# Patient Record
Sex: FEMALE | Race: WHITE | NOT HISPANIC OR LATINO | Employment: OTHER | URBAN - METROPOLITAN AREA
[De-identification: names, ages, dates, MRNs, and addresses within clinical notes are randomized per-mention and may not be internally consistent; named-entity substitution may affect disease eponyms.]

---

## 2022-12-15 ENCOUNTER — HOSPITAL ENCOUNTER (INPATIENT)
Facility: HOSPITAL | Age: 87
LOS: 5 days | Discharge: NURSING, REHAB OR LONG-TERM CARE | End: 2022-12-20
Attending: EMERGENCY MEDICINE | Admitting: INTERNAL MEDICINE

## 2022-12-15 ENCOUNTER — APPOINTMENT (OUTPATIENT)
Dept: GENERAL RADIOLOGY | Facility: HOSPITAL | Age: 87
End: 2022-12-15

## 2022-12-15 ENCOUNTER — APPOINTMENT (OUTPATIENT)
Dept: CT IMAGING | Facility: HOSPITAL | Age: 87
End: 2022-12-15

## 2022-12-15 DIAGNOSIS — R13.10 DYSPHAGIA, UNSPECIFIED TYPE: ICD-10-CM

## 2022-12-15 DIAGNOSIS — R47.1 DYSARTHRIA: ICD-10-CM

## 2022-12-15 DIAGNOSIS — I16.0 HYPERTENSIVE URGENCY: ICD-10-CM

## 2022-12-15 DIAGNOSIS — R53.1 ACUTE RIGHT-SIDED WEAKNESS: Primary | ICD-10-CM

## 2022-12-15 PROBLEM — I10 ESSENTIAL HYPERTENSION: Status: ACTIVE | Noted: 2022-12-15

## 2022-12-15 LAB
ALBUMIN SERPL-MCNC: 4.3 G/DL (ref 3.5–5.2)
ALBUMIN/GLOB SERPL: 1.7 G/DL
ALP SERPL-CCNC: 65 U/L (ref 39–117)
ALT SERPL W P-5'-P-CCNC: 17 U/L (ref 1–33)
ANION GAP SERPL CALCULATED.3IONS-SCNC: 13 MMOL/L (ref 5–15)
APTT PPP: 28.3 SECONDS (ref 22–39)
AST SERPL-CCNC: 28 U/L (ref 1–32)
BACTERIA UR QL AUTO: ABNORMAL /HPF
BASOPHILS # BLD AUTO: 0.05 10*3/MM3 (ref 0–0.2)
BASOPHILS NFR BLD AUTO: 0.9 % (ref 0–1.5)
BILIRUB SERPL-MCNC: 0.7 MG/DL (ref 0–1.2)
BILIRUB UR QL STRIP: NEGATIVE
BUN BLDA-MCNC: 8 MG/DL (ref 8–26)
BUN SERPL-MCNC: 8 MG/DL (ref 8–23)
BUN/CREAT SERPL: 14.5 (ref 7–25)
CA-I BLDA-SCNC: 1.15 MMOL/L (ref 1.2–1.32)
CALCIUM SPEC-SCNC: 9.2 MG/DL (ref 8.2–9.6)
CHLORIDE BLDA-SCNC: 104 MMOL/L (ref 98–109)
CHLORIDE SERPL-SCNC: 105 MMOL/L (ref 98–107)
CLARITY UR: CLEAR
CO2 BLDA-SCNC: 25 MMOL/L (ref 24–29)
CO2 SERPL-SCNC: 24 MMOL/L (ref 22–29)
COLOR UR: YELLOW
CREAT BLDA-MCNC: 0.5 MG/DL (ref 0.6–1.3)
CREAT SERPL-MCNC: 0.55 MG/DL (ref 0.57–1)
DEPRECATED RDW RBC AUTO: 46.3 FL (ref 37–54)
EGFRCR SERPLBLD CKD-EPI 2021: 83 ML/MIN/1.73
EGFRCR SERPLBLD CKD-EPI 2021: 84.9 ML/MIN/1.73
EOSINOPHIL # BLD AUTO: 0.2 10*3/MM3 (ref 0–0.4)
EOSINOPHIL NFR BLD AUTO: 3.7 % (ref 0.3–6.2)
ERYTHROCYTE [DISTWIDTH] IN BLOOD BY AUTOMATED COUNT: 13.6 % (ref 12.3–15.4)
FLUAV RNA RESP QL NAA+PROBE: NOT DETECTED
FLUBV RNA RESP QL NAA+PROBE: NOT DETECTED
GLOBULIN UR ELPH-MCNC: 2.5 GM/DL
GLUCOSE BLDC GLUCOMTR-MCNC: 108 MG/DL (ref 70–130)
GLUCOSE SERPL-MCNC: 117 MG/DL (ref 65–99)
GLUCOSE UR STRIP-MCNC: NEGATIVE MG/DL
HCT VFR BLD AUTO: 45.4 % (ref 34–46.6)
HCT VFR BLDA CALC: 44 % (ref 38–51)
HGB BLD-MCNC: 15.4 G/DL (ref 12–15.9)
HGB BLDA-MCNC: 15 G/DL (ref 12–17)
HGB UR QL STRIP.AUTO: ABNORMAL
HOLD SPECIMEN: NORMAL
HYALINE CASTS UR QL AUTO: ABNORMAL /LPF
IMM GRANULOCYTES # BLD AUTO: 0.02 10*3/MM3 (ref 0–0.05)
IMM GRANULOCYTES NFR BLD AUTO: 0.4 % (ref 0–0.5)
INR PPP: 1.1 (ref 0.8–1.2)
KETONES UR QL STRIP: ABNORMAL
LEUKOCYTE ESTERASE UR QL STRIP.AUTO: NEGATIVE
LYMPHOCYTES # BLD AUTO: 1.45 10*3/MM3 (ref 0.7–3.1)
LYMPHOCYTES NFR BLD AUTO: 26.6 % (ref 19.6–45.3)
MCH RBC QN AUTO: 31.6 PG (ref 26.6–33)
MCHC RBC AUTO-ENTMCNC: 33.9 G/DL (ref 31.5–35.7)
MCV RBC AUTO: 93.2 FL (ref 79–97)
MONOCYTES # BLD AUTO: 0.55 10*3/MM3 (ref 0.1–0.9)
MONOCYTES NFR BLD AUTO: 10.1 % (ref 5–12)
NEUTROPHILS NFR BLD AUTO: 3.18 10*3/MM3 (ref 1.7–7)
NEUTROPHILS NFR BLD AUTO: 58.3 % (ref 42.7–76)
NITRITE UR QL STRIP: NEGATIVE
NRBC BLD AUTO-RTO: 0 /100 WBC (ref 0–0.2)
PH UR STRIP.AUTO: 8 [PH] (ref 5–8)
PLATELET # BLD AUTO: 230 10*3/MM3 (ref 140–450)
PMV BLD AUTO: 9.9 FL (ref 6–12)
POTASSIUM BLDA-SCNC: 3.9 MMOL/L (ref 3.5–4.9)
POTASSIUM SERPL-SCNC: 4.3 MMOL/L (ref 3.5–5.2)
PROT SERPL-MCNC: 6.8 G/DL (ref 6–8.5)
PROT UR QL STRIP: NEGATIVE
PROTHROMBIN TIME: 13.2 SECONDS (ref 12.8–15.2)
QT INTERVAL: 394 MS
QTC INTERVAL: 476 MS
RBC # BLD AUTO: 4.87 10*6/MM3 (ref 3.77–5.28)
RBC # UR STRIP: ABNORMAL /HPF
REF LAB TEST METHOD: ABNORMAL
SARS-COV-2 RNA RESP QL NAA+PROBE: NOT DETECTED
SODIUM BLD-SCNC: 141 MMOL/L (ref 138–146)
SODIUM SERPL-SCNC: 142 MMOL/L (ref 136–145)
SP GR UR STRIP: 1.02 (ref 1–1.03)
SQUAMOUS #/AREA URNS HPF: ABNORMAL /HPF
TROPONIN T SERPL-MCNC: 0.01 NG/ML (ref 0–0.03)
UROBILINOGEN UR QL STRIP: ABNORMAL
WBC # UR STRIP: ABNORMAL /HPF
WBC NRBC COR # BLD: 5.45 10*3/MM3 (ref 3.4–10.8)
WHOLE BLOOD HOLD COAG: NORMAL
WHOLE BLOOD HOLD SPECIMEN: NORMAL

## 2022-12-15 PROCEDURE — 80047 BASIC METABLC PNL IONIZED CA: CPT

## 2022-12-15 PROCEDURE — 85730 THROMBOPLASTIN TIME PARTIAL: CPT

## 2022-12-15 PROCEDURE — 36415 COLL VENOUS BLD VENIPUNCTURE: CPT

## 2022-12-15 PROCEDURE — 87636 SARSCOV2 & INF A&B AMP PRB: CPT | Performed by: EMERGENCY MEDICINE

## 2022-12-15 PROCEDURE — 99221 1ST HOSP IP/OBS SF/LOW 40: CPT | Performed by: INTERNAL MEDICINE

## 2022-12-15 PROCEDURE — 70450 CT HEAD/BRAIN W/O DYE: CPT

## 2022-12-15 PROCEDURE — 85610 PROTHROMBIN TIME: CPT

## 2022-12-15 PROCEDURE — 85014 HEMATOCRIT: CPT

## 2022-12-15 PROCEDURE — 70498 CT ANGIOGRAPHY NECK: CPT

## 2022-12-15 PROCEDURE — 92523 SPEECH SOUND LANG COMPREHEN: CPT

## 2022-12-15 PROCEDURE — 99285 EMERGENCY DEPT VISIT HI MDM: CPT

## 2022-12-15 PROCEDURE — 0 IOPAMIDOL PER 1 ML: Performed by: EMERGENCY MEDICINE

## 2022-12-15 PROCEDURE — 71045 X-RAY EXAM CHEST 1 VIEW: CPT

## 2022-12-15 PROCEDURE — 84484 ASSAY OF TROPONIN QUANT: CPT

## 2022-12-15 PROCEDURE — 81001 URINALYSIS AUTO W/SCOPE: CPT | Performed by: EMERGENCY MEDICINE

## 2022-12-15 PROCEDURE — 80053 COMPREHEN METABOLIC PANEL: CPT

## 2022-12-15 PROCEDURE — 0042T HC CT CEREBRAL PERFUSION W/WO CONTRAST: CPT

## 2022-12-15 PROCEDURE — 92610 EVALUATE SWALLOWING FUNCTION: CPT

## 2022-12-15 PROCEDURE — 85025 COMPLETE CBC W/AUTO DIFF WBC: CPT

## 2022-12-15 PROCEDURE — 4A03X5D MEASUREMENT OF ARTERIAL FLOW, INTRACRANIAL, EXTERNAL APPROACH: ICD-10-PCS | Performed by: INTERNAL MEDICINE

## 2022-12-15 PROCEDURE — 99223 1ST HOSP IP/OBS HIGH 75: CPT

## 2022-12-15 PROCEDURE — 93005 ELECTROCARDIOGRAM TRACING: CPT

## 2022-12-15 PROCEDURE — 70496 CT ANGIOGRAPHY HEAD: CPT

## 2022-12-15 RX ORDER — SODIUM CHLORIDE 0.9 % (FLUSH) 0.9 %
10 SYRINGE (ML) INJECTION AS NEEDED
Status: DISCONTINUED | OUTPATIENT
Start: 2022-12-15 | End: 2022-12-20 | Stop reason: HOSPADM

## 2022-12-15 RX ORDER — ASPIRIN 81 MG/1
81 TABLET, CHEWABLE ORAL DAILY
Status: DISCONTINUED | OUTPATIENT
Start: 2022-12-15 | End: 2022-12-16

## 2022-12-15 RX ORDER — SODIUM CHLORIDE 0.9 % (FLUSH) 0.9 %
10 SYRINGE (ML) INJECTION EVERY 12 HOURS SCHEDULED
Status: DISCONTINUED | OUTPATIENT
Start: 2022-12-15 | End: 2022-12-20 | Stop reason: HOSPADM

## 2022-12-15 RX ORDER — SODIUM CHLORIDE 9 MG/ML
40 INJECTION, SOLUTION INTRAVENOUS AS NEEDED
Status: DISCONTINUED | OUTPATIENT
Start: 2022-12-15 | End: 2022-12-20 | Stop reason: HOSPADM

## 2022-12-15 RX ORDER — ASPIRIN 300 MG/1
300 SUPPOSITORY RECTAL DAILY
Status: DISCONTINUED | OUTPATIENT
Start: 2022-12-15 | End: 2022-12-16

## 2022-12-15 RX ORDER — SODIUM CHLORIDE 9 MG/ML
100 INJECTION, SOLUTION INTRAVENOUS CONTINUOUS
Status: ACTIVE | OUTPATIENT
Start: 2022-12-15 | End: 2022-12-16

## 2022-12-15 RX ORDER — ATORVASTATIN CALCIUM 40 MG/1
40 TABLET, FILM COATED ORAL NIGHTLY
Status: DISCONTINUED | OUTPATIENT
Start: 2022-12-15 | End: 2022-12-20 | Stop reason: HOSPADM

## 2022-12-15 RX ADMIN — Medication 10 ML: at 21:26

## 2022-12-15 RX ADMIN — IOPAMIDOL 115 ML: 755 INJECTION, SOLUTION INTRAVENOUS at 10:10

## 2022-12-15 RX ADMIN — SODIUM CHLORIDE 100 ML/HR: 9 INJECTION, SOLUTION INTRAVENOUS at 16:13

## 2022-12-15 RX ADMIN — ASPIRIN 300 MG: 300 SUPPOSITORY RECTAL at 12:17

## 2022-12-15 NOTE — THERAPY EVALUATION
Acute Care - Speech Language Pathology Initial Evaluation  [facility]   Cognitive-Communication Evaluation  Clinical Swallow Evaluation       Patient Name: Lu Hou  : 1924  MRN: [id number]  Today's Date: 12/15/2022               Admit Date: 12/15/2022     Visit Dx:    ICD-10-CM ICD-9-CM   1. Acute right-sided weakness  R53.1 728.87   2. Hypertensive urgency  I16.0 401.9   3. Dysarthria  R47.1 784.51   4. Dysphagia, unspecified type  R13.10 787.20     Patient Active Problem List   Diagnosis   • Right sided weakness   • Essential hypertension     Past Medical History:   Diagnosis Date   • Hypertension      History reviewed. No pertinent surgical history.    SLP Recommendation and Plan  SLP Diagnosis: mild, dysarthria, cognitive-linguistic disorder (12/15/22 1540)  SLP Diagnosis Comments: Pt w/ mild dysarthria and at least mild cognitive communication deficits. Receptive/expressive language is grossly functional. No family present to assist in determining baseline status. SLP will f/u for tx as appropriate (12/15/22 1540)        Swallow Criteria for Skilled Therapeutic Interventions Met: demonstrates skilled criteria (12/15/22 1540)  SLC Criteria for Skilled Therapy Interventions Met: yes (12/15/22 1540)  Anticipated Discharge Disposition (SLP): unknown, anticipate therapy at next level of care (12/15/22 1540)        Predicted Duration Therapy Intervention (Days): until discharge (12/15/22 1540)  SLC Diagnostic Follow-Up Needed: reading comprehension, graphic expression, higher-level cognitive-linguistic, other (see comments) (Clarification of cog comm baseline status) (12/15/22 1540)                              SLP EVALUATION (last 72 hours)     SLP SLC Evaluation     Row Name 12/15/22 1540                   General Information    Prior Level of Function-Communication unknown  -MH        Patient's Goals for Discharge patient did not state  -           Comprehension Assessment/Intervention     Comprehension Assessment/Intervention Auditory Comprehension  -           Auditory Comprehension Assessment/Intervention    Auditory Comprehension (Communication) WFL  -           Expression Assessment/Intervention    Expression Assessment/Intervention verbal expression  -           Verbal Expression Assessment/Intervention    Verbal Expression WFL  -           Oral Motor Structure and Function    Oral Motor Structure and Function WFL  -           Motor Speech Assessment/Intervention    Motor Speech Function mild impairment  -        Characteristics Consistent with Dysarthria decreased intensity;slurred speech  -           Cognitive Assessment Intervention- SLP    Cognitive Function (Cognition) mild impairment;other (see comments)  Difficult to fully assess  -        Orientation Status (Cognition) awareness of basic personal information;person;WFL;place;time;situation;mild impairment  -        Memory (Cognitive) simple;immediate;WFL;delayed;mild impairment  -        Attention (Cognitive) selective;sustained;WFL  -        Thought Organization (Cognitive) concrete divergent;concrete convergent;mild impairment  -        Reasoning (Cognitive) --  -           SLP Evaluation Clinical Impressions    SLP Diagnosis mild;dysarthria;cognitive-linguistic disorder  -        SLP Diagnosis Comments Pt w/ mild dysarthria and at least mild cognitive communication deficits. Receptive/expressive language is grossly functional. No family present to assist in determining baseline status. SLP will f/u for tx as appropriate  -        Rehab Potential/Prognosis good  -        SLC Criteria for Skilled Therapy Interventions Met yes  -        Functional Impact difficulty in expressing complex messages;difficulty communicating in an emergency  -           Recommendations    Therapy Frequency (SLP SLC) 3 days per week  -        Further Assessment Needed in the Following Areas reading comprehension;graphic  expression;clarification of baseline cognitive-communication status;higher-level cognitive-linguistic  -        SLC Diagnostic Follow-Up Needed reading comprehension;graphic expression;higher-level cognitive-linguistic;other (see comments)  Clarification of cog comm baseline status  -              User Key  (r) = Recorded By, (t) = Taken By, (c) = Cosigned By    Initials Name Effective Dates     Nataly Mcmahon, MS, CFY-SLP 06/22/22 -                    EDUCATION  The patient has been educated in the following areas:     Cognitive Impairment Communication Impairment.           SLP GOALS     Row Name 12/15/22 8947             Phonation Goal 1 (SLP)    Improve Phonation By Goal 1 (SLP) using loud speech;80%;with minimal cues (75-90%)  -      Time Frame (Phonation Goal 1, SLP) short term goal (STG)  -MH      Progress/Outcomes (Phonation Goal 1, SLP) new goal  -MH         Articulation Goal 1 (SLP)    Improve Articulation Goal 1 (SLP) by over-articulating at phrase level;by over-articulating in connected speech;80%;with minimal cues (75-90%)  -      Time Frame (Articulation Goal 1, SLP) short term goal (STG)  -MH      Progress/Outcomes (Articulation Goal 1, SLP) new goal  -MH         Orientation Goal 1 (SLP)    Improve Orientation Through Goal 1 (SLP) demonstrating orientation to day;demonstrating orientation to month;demonstrating orientation to year;demonstrating orientation to place;demonstrating orientation to disease/impairment;80%;with minimal cues (75-90%)  -      Time Frame (Orientation Goal 1, SLP) short term goal (STG)  -MH      Progress/Outcomes (Orientation Goal 1, SLP) new goal  -MH         Memory Skills Goal 1 (SLP)    Improve Memory Skills Through Goal 1 (SLP) recalling related word lists with an imposed delay;recalling unrelated word lists with an imposed delay;80%;with minimal cues (75-90%)  -      Time Frame (Memory Skills Goal 1, SLP) short term goal (STG)  -MH      Progress/Outcomes  (Memory Skills Goal 1, SLP) new goal  -MH         Patient will demonstrate functional speech skills for return to discharge environment    [place] [facility]  -      Time frame by discharge  -      Progress/Outcomes new goal  -MH         Patient will demonstrate functional cognitive-linguistic skills for return to discharge environment    [place] [facility]  -MH      Time frame by discharge  -MH      Progress/Outcomes new goal  -MH            User Key  (r) = Recorded By, (t) = Taken By, (c) = Cosigned By    Initials Name Provider Type     Nataly Mcmahon MS, CFY-SLP Speech and Language Pathologist                        Time Calculation:      Time Calculation- SLP     Row Name 12/15/22 1639             Time Calculation- SLP    SLP Start Time 1540  -      SLP Received On 12/15/22  -         Untimed Charges    94123-PP Eval Speech and Production w/ Language Minutes 36  -MH      07764-FC Eval Oral Pharyng Swallow Minutes 35  -MH         Total Minutes    Untimed Charges Total Minutes 71  -MH       Total Minutes 71  -MH            User Key  (r) = Recorded By, (t) = Taken By, (c) = Cosigned By    Initials Name Provider Type     Nataly Mcmahon, MS, CFY-SLP Speech and Language Pathologist                Therapy Charges for Today     Code Description Service Date Service Provider Modifiers Qty    21656930978 HC ST EVAL ORAL PHARYNG SWALLOW 2 12/15/2022 Nataly Mcmahon MS, CFY-SLP GN 1    27863079967 HC ST EVAL SPEECH AND PROD W LANG  2 12/15/2022 Nataly Mcmahon, MS, CFDEVIKA-SLP GN 1                     Nataly Mcmahon MS, CFY-SLP  12/15/2022 and Acute Care - Speech Language Pathology   Swallow Initial Evaluation  [facility]     Patient Name: Lu Hou  : 1924  MRN: [id number]  Today's Date: 12/15/2022               Admit Date: 12/15/2022    Visit Dx:     ICD-10-CM ICD-9-CM   1. Acute right-sided weakness  R53.1 728.87   2. Hypertensive urgency  I16.0 401.9   3. Dysarthria   R47.1 784.51   4. Dysphagia, unspecified type  R13.10 787.20     Patient Active Problem List   Diagnosis   • Right sided weakness   • Essential hypertension     Past Medical History:   Diagnosis Date   • Hypertension      History reviewed. No pertinent surgical history.    SLP Recommendation and Plan  SLP Swallowing Diagnosis: suspected pharyngeal dysphagia (12/15/22 1540)  SLP Diet Recommendation: NPO, other (see comments) (meds ok in applesauce) (12/15/22 1540)  Recommended Precautions and Strategies: general aspiration precautions (12/15/22 1540)  SLP Rec. for Method of Medication Administration: meds crushed, with puree (essential meds ok in applesauce) (12/15/22 1540)     Monitor for Signs of Aspiration: yes, notify SLP if any concerns (12/15/22 1540)  Recommended Diagnostics: VFSS ([facility]) (12/16) (12/15/22 1540)  Swallow Criteria for Skilled Therapeutic Interventions Met: demonstrates skilled criteria (12/15/22 1540)  Anticipated Discharge Disposition (SLP): unknown, anticipate therapy at next level of care (12/15/22 1540)  Rehab Potential/Prognosis, Swallowing: good, to achieve stated therapy goals (12/15/22 1540)     Predicted Duration Therapy Intervention (Days): until discharge (12/15/22 1540)                                               SWALLOW EVALUATION (last 72 hours)     SLP Adult Swallow Evaluation     Row Name 12/15/22 1540                   Rehab Evaluation    Document Type evaluation  -        Subjective Information no complaints  -        Patient Observations cooperative  -        Patient/Family/Caregiver Comments/Observations No family present  -        Patient Effort good  -        Symptoms Noted During/After Treatment none  -           General Information    Patient Profile Reviewed yes  -MH        Pertinent History Of Current Problem Adm for further evaluation of R sided weakness, facial droop, & dysarthria. No significant medical hx available in chart. MRI: negative for acute  intracranial abnormalities  -        Current Method of Nutrition NPO  -        Precautions/Limitations, Vision WFL;for purposes of eval  -        Precautions/Limitations, Hearing WFL;for purposes of eval  -        Prior Level of Function-Communication unknown  -        Prior Level of Function-Swallowing unknown  -        Plans/Goals Discussed with patient;agreed upon  -        Barriers to Rehab none identified  -        Patient's Goals for Discharge patient did not state  -           Pain    Additional Documentation Pain Scale: FACES Pre/Post-Treatment (Group)  -           Pain Scale: FACES Pre/Post-Treatment    Pain: FACES Scale, Pretreatment 0-->no hurt  -        Posttreatment Pain Rating 0-->no hurt  -           Oral Motor Structure and Function    Dentition Assessment upper dentures/partial in place;lower dentures/partial in place  -        Secretion Management WNL/WFL  -        Mucosal Quality moist, healthy  -           Oral Musculature and Cranial Nerve Assessment    Oral Motor General Assessment oral labial or buccal impairment  -        Oral Labial or Buccal Impairment, Detail, Cranial Nerve VII (Facial): right labial droop;other (see comments)  Dense right labial droop  -           General Eating/Swallowing Observations    Respiratory Support Currently in Use room air  -        Eating/Swallowing Skills self-fed;fed by SLP  -        Positioning During Eating upright in bed  -        Utensils Used spoon;cup;straw  -        Consistencies Trialed thin liquids;nectar/syrup-thick liquids;pureed  -           Clinical Swallow Eval    Pharyngeal Phase suspected pharyngeal impairment  -        Clinical Swallow Evaluation Summary Pt w/ overt s/s of aspiration c/b consistent throat clear w/ thin liquid and nectar thick liquid trials; delayed cough w/ nectar. Dense right facial droop w/ minimal anterior loss w/ cup presentations. No overt s/s w/ pureed & adequate oral  manipulation/clearance & no significant residue. Discussed aspiration risk w/ pt given overt s/s, pt able to demonstrate understanding and states she would like to speak to her son prior to making any decisions. Unable to safely recommend PO diet at this time. However, NPO may not be most appropriate given advanced age. Safest recommendation is NPO/, amanda HEADLEY. Essential meds ok in applesauce. SLP will f/u for [facility] 12/16 to further assess swallow fx  -           Pharyngeal Phase Concerns    Pharyngeal Phase Concerns cough;throat clear  -        Cough nectar  -        Throat Clear thin;nectar  -           SLP Evaluation Clinical Impression    SLP Swallowing Diagnosis suspected pharyngeal dysphagia  -        Functional Impact risk of aspiration/pneumonia  -        Rehab Potential/Prognosis, Swallowing good, to achieve stated therapy goals  -        Swallow Criteria for Skilled Therapeutic Interventions Met demonstrates skilled criteria  -           Recommendations    Predicted Duration Therapy Intervention (Days) until discharge  -        SLP Diet Recommendation NPO;other (see comments)  meds ok in applesauce  -        Recommended Diagnostics VFSS ([facility])  12/16  -        Recommended Precautions and Strategies general aspiration precautions  -        Oral Care Recommendations Oral Care BID/PRN  -        SLP Rec. for Method of Medication Administration meds crushed;with puree  essential meds ok in applesauce  -        Monitor for Signs of Aspiration yes;notify SLP if any concerns  -        Anticipated Discharge Disposition (SLP) unknown;anticipate therapy at next level of care  -              User Key  (r) = Recorded By, (t) = Taken By, (c) = Cosigned By    Initials Name Effective Dates     Nataly Mcmahon, MS, NAOMI-SLP 06/22/22 -                 EDUCATION  The patient has been educated in the following areas:   Dysphagia (Swallowing Impairment).        SLP GOALS     Row Name 12/15/22  1540             Phonation Goal 1 (SLP)    Improve Phonation By Goal 1 (SLP) using loud speech;80%;with minimal cues (75-90%)  -MH      Time Frame (Phonation Goal 1, SLP) short term goal (STG)  -MH      Progress/Outcomes (Phonation Goal 1, SLP) new goal  -MH         Articulation Goal 1 (SLP)    Improve Articulation Goal 1 (SLP) by over-articulating at phrase level;by over-articulating in connected speech;80%;with minimal cues (75-90%)  -MH      Time Frame (Articulation Goal 1, SLP) short term goal (STG)  -MH      Progress/Outcomes (Articulation Goal 1, SLP) new goal  -MH         Orientation Goal 1 (SLP)    Improve Orientation Through Goal 1 (SLP) demonstrating orientation to day;demonstrating orientation to month;demonstrating orientation to year;demonstrating orientation to place;demonstrating orientation to disease/impairment;80%;with minimal cues (75-90%)  -MH      Time Frame (Orientation Goal 1, SLP) short term goal (STG)  -MH      Progress/Outcomes (Orientation Goal 1, SLP) new goal  -MH         Memory Skills Goal 1 (SLP)    Improve Memory Skills Through Goal 1 (SLP) recalling related word lists with an imposed delay;recalling unrelated word lists with an imposed delay;80%;with minimal cues (75-90%)  -      Time Frame (Memory Skills Goal 1, SLP) short term goal (STG)  -MH      Progress/Outcomes (Memory Skills Goal 1, SLP) new goal  -MH         Patient will demonstrate functional speech skills for return to discharge environment    [place] [facility]  -MH      Time frame by discharge  -MH      Progress/Outcomes new goal  -MH         Patient will demonstrate functional cognitive-linguistic skills for return to discharge environment    [place] [facility]  -MH      Time frame by discharge  -MH      Progress/Outcomes new goal  -MH            User Key  (r) = Recorded By, (t) = Taken By, (c) = Cosigned By    Initials Name Provider Type    Nataly Stewart, MS, CFY-SLP Speech and Language  Pathologist                   Time Calculation:    Time Calculation- SLP     Row Name 12/15/22 1639             Time Calculation- SLP    SLP Start Time 1540  -      SLP Received On 12/15/22  -         Untimed Charges    34245-WN Eval Speech and Production w/ Language Minutes 36  -MH      07037-CU Eval Oral Pharyng Swallow Minutes 35  -MH         Total Minutes    Untimed Charges Total Minutes 71  -       Total Minutes 71  -MH            User Key  (r) = Recorded By, (t) = Taken By, (c) = Cosigned By    Initials Name Provider Type     Nataly Mcmahon, MS, CFY-SLP Speech and Language Pathologist                Therapy Charges for Today     Code Description Service Date Service Provider Modifiers Qty    49158814953 HC ST EVAL ORAL PHARYNG SWALLOW 2 12/15/2022 Nataly Mcmahon, MS, CFDEVIKA-SLP GN 1    [id number] HC ST EVAL SPEECH AND PROD W LANG  2 12/15/2022 Nataly Mcmahon, MS, NAOMI-SLP GN 1               Nataly Mcmahon MS, NAOMI-SLP  12/15/2022

## 2022-12-15 NOTE — PLAN OF CARE
Goal Outcome Evaluation:   SLP evaluation completed. Will continue to address dysphagia & communication. Please see note for further details and recommendations.

## 2022-12-15 NOTE — CONSULTS
Stroke Consult Note    Patient Name: Lu Hou   MRN: [id number]  Age: 89 y.o.  Sex: female  : 1924    Primary Care Physician: No primary care provider on file.  Referring Physician: Dr. Ramy Fall    TIME STROKE TEAM CALLED: 0939 EST     TIME PATIENT SEEN: 0944 EST    Handedness: Right  Race:     Chief Complaint/Reason for Consultation: Right-sided weakness, facial droop, and dysarthria    HPI: Mrs. Hou is a 89-year-old female with no known medical diagnoses who presents to the emergency department today for further evaluation of right-sided weakness, facial droop, and dysarthria which was present upon awakening this morning.  She tells me that she went to bed last night in her normal state of health around 2100 and awoke with symptoms.  Her son, who lives with her, came to wake her up and found her incontinent of urine.  He went to gather supplies to clean her up and prior to returning her to thawed on the floor.  When he came back into the room he found her lying on the floor with right-sided weakness.    Patient tells me that she does not take any medications including antiplatelet or anticoagulation.  She is a lifelong non-smoker, denies alcohol use, or illicit drug use.  She is a personal history of TIA/CVA or seizure activity.    She has been at baseline that she ambulates with a walker and that she is independent of most of her ADLs.  She does require assistance with bathing and preparing food.    Last Known Normal Date/Time: 2022 2100 EST     Review of Systems   Constitutional: Positive for activity change. Negative for chills, fatigue and fever.   HENT: Negative for nosebleeds and trouble swallowing.    Eyes: Negative for photophobia and visual disturbance.   Respiratory: Negative for cough, chest tightness and shortness of breath.    Cardiovascular: Negative for chest pain and palpitations.   Gastrointestinal: Negative for blood in stool, constipation, diarrhea, nausea  and vomiting.   Genitourinary: Negative for difficulty urinating, dysuria and hematuria.   Musculoskeletal: Positive for gait problem.   Skin: Negative.    Neurological: Positive for facial asymmetry, speech difficulty and weakness. Negative for dizziness, seizures, syncope, numbness and headaches.   Hematological: Negative.    Psychiatric/Behavioral: Negative.       No past medical history on file.  No past surgical history on file.  No family history on file.     No Known Allergies  Prior to Admission medications    Not on File            Neurological Exam  Mental Status  Awake and alert. Oriented only to person and place. Mild dysarthria present. Language is fluent with no aphasia.    Cranial Nerves  CN II: Visual fields full to confrontation.  CN III, IV, VI: Extraocular movements intact bilaterally. Pupils equal round and reactive to light bilaterally.  CN V: Facial sensation is normal.  CN VII:  Right: There is peripheral facial weakness.  CN VIII: Hearing appears to be intact bilaterally.  CN IX, X: Palate elevates symmetrically  CN XII: Tongue midline without atrophy or fasciculations.    Motor  Decreased muscle bulk throughout. No fasciculations present. Decreased muscle tone.  Right upper extremity with mild drift, 4 -/5 strength  Right lower extremity with no drift, 4 -/5 strength  Left upper and lower extremity with no drift, 4+/5 strength.    Sensory  Light touch is normal in upper and lower extremities.     Coordination  Right: Finger-to-nose abnormality:Left: Finger-to-nose abnormality:    Gait    Not observed.      Physical Exam  Vitals reviewed.   Constitutional:       General: She is awake. She is not in acute distress.     Appearance: She is not ill-appearing.   HENT:      Head: Normocephalic and atraumatic.      Mouth/Throat:      Mouth: Mucous membranes are dry.   Eyes:      Extraocular Movements: Extraocular movements intact.      Pupils: Pupils are equal, round, and reactive to light.    Cardiovascular:      Rate and Rhythm: Normal rate and regular rhythm.   Pulmonary:      Effort: Pulmonary effort is normal. No respiratory distress.      Comments: On room air  Musculoskeletal:      Cervical back: No rigidity or tenderness.      Right lower leg: No edema.      Left lower leg: No edema.   Skin:     General: Skin is warm and dry.   Neurological:      Mental Status: She is alert. She is disoriented.      Cranial Nerves: Cranial nerve deficit and dysarthria present.      Sensory: No sensory deficit.      Motor: Weakness present.      Coordination: Coordination abnormal.   Psychiatric:         Mood and Affect: Mood normal.         Behavior: Behavior normal.         Acute Stroke Data    Thrombolytic Inclusion / Exclusion Criteria    Time: 09:51 EST  Person Administering Scale: LIDIA Hodge    Inclusion Criteria  [x]   18 years of age or greater   []   Onset of symptoms < 4.5 hours before beginning treatment (stroke onset = time patient was last seen well or without symptoms).   []   Diagnosis of acute ischemic stroke causing measurable disabling deficit (Complete Hemianopia, Any Aphasia, Visual or Sensory Extinction, Any weakness limiting sustained effort against gravity)   []   Any remaining deficit considered potentially disabling in view of patient and practitioner   Exclusion criteria (Do not proceed with Alteplase if any are checked under exclusion criteria)  [x]   Onset unknown or GREATER than 4.5 hours   []   ICH on CT/MRI   []   CT demonstrates hypodensity representing acute or subacute infarct   []   Significant head trauma or prior stroke in the previous 3 months   []   Symptoms suggestive of subarachnoid hemorrhage   []   History of un-ruptured intracranial aneurysm GREATER than 10 mm   []   Recent intracranial or intraspinal surgery within the last 3 months   []   Arterial puncture at a non-compressible site in the previous 7 days   []   Active internal bleeding   []   Acute  bleeding tendency   []   Platelet count LESS than 100,000 for known hematological diseases such as leukemia, thrombocytopenia or chronic cirrhosis   []   Current use of anticoagulant with INR GREATER than 1.7 or PT GREATER than 15 seconds, aPTT GREATER than 40 seconds   []   Heparin received within 48 hours, resulting in abnormally elevated aPTT GREATER than upper limit of normal   []   Current use of direct thrombin inhibitors or direct factor Xa inhibitors in the past 48 hours   []   Elevated blood pressure refractory to treatment (systolic GREATER than 185 mm/Hg or diastolic  GREATER than 110 mm/Hg   []   Suspected infective endocarditis and aortic arch dissection   []   Current use of therapeutic treatment dose of low-molecular-weight heparin (LMWH) within the previous 24 hours   []   Structural GI malignancy or bleed   Relative exclusion for all patients  []   Only minor non-disabling symptoms   []   Pregnancy   []   Seizure at onset with postictal residual neurological impairments   []   Major surgery or previous trauma within past 14 days   []   History of previous spontaneous ICH, intracranial neoplasm, or AV malformation   []   Postpartum (within previous 14 days)   []   Recent GI or urinary tract hemorrhage (within previous 21 days)   []   Recent acute MI (within previous 3 months)   []   History of un-ruptured intracranial aneurysm LESS than 10 mm   []   History of ruptured intracranial aneurysm   []   Blood glucose LESS than 50 mg/dL (2.7 mmol/L)   []   Dural puncture within the last 7 days   []   Known GREATER than 10 cerebral microbleeds   Additional exclusions for patients with symptoms onset between 3 and 4.5 hours.  [x]   Age > 80.   []   On any anticoagulants regardless of INR  >>> Warfarin (Coumadin), Heparin, Enoxaparin (Lovenox), fondaparinux (Arixtra), bivalirudin (Angiomax), Argatroban, dabigatran (Pradaxa), rivaroxaban (Xarelto), or apixaban (Eliquis)   []   Severe stroke (NIHSS > 25).   []    History of BOTH diabetes and previous ischemic stroke.   []   The risks and benefits have been discussed with the patient or family related to the administration of IV thrombolytic therapy for stroke symptoms.   []   I have discussed and reviewed the patient's case and imaging with the attending prior to IV thrombolytic therapy.   N/A Time IV thrombolytic administered       Hospital Meds:  Scheduled-    Infusions-     PRNs- •  sodium chloride    Functional Status Prior to Current Stroke/[place] Score: 2-3    NIH Stroke Scale  Time: 09:51 EST  Person Administering Scale: LIDIA Hodge    Interval: baseline  1a. Level of Consciousness: 0-->Alert, keenly responsive  1b. LOC Questions: 1-->Answers one question correctly  1c. LOC Commands: 0-->Performs both tasks correctly  2. Best Gaze: 0-->Normal  3. Visual: 0-->No visual loss  4. Facial Palsy: 2-->Partial paralysis (total or near-total paralysis of lower face)  5a. Motor Arm, Left: 0-->No drift, limb holds 90 (or 45) degrees for full 10 secs  5b. Motor Arm, Right: 1-->Drift, limb holds 90 (or 45) degrees, but drifts down before full 10 secs, does not hit bed or other support  6a. Motor Leg, Left: 0-->No drift, leg holds 30 degree position for full 5 secs  6b. Motor Leg, Right: 0-->No drift, leg holds 30 degree position for full 5 secs  7. Limb Ataxia: 0-->Absent  8. Sensory: 0-->Normal, no sensory loss  9. Best Language: 0-->No aphasia, normal  10. Dysarthria: 1-->Mild-to-moderate dysarthria, patient slurs at least some words and, at worst, can be understood with some difficulty  11. Extinction and Inattention (formerly Neglect): 0-->No abnormality    Total (NIH Stroke Scale): 5      Results Reviewed:  I have personally reviewed current lab, radiology, and data and agree with results.    CT of the head without contrast reveals generalized atrophy, no evidence of hemorrhage and/or acute process.    CTA head/neck reveals mild atherosclerotic disease in  bilateral carotid arteries with a mild right ICA stenosis.  No evidence of flow-limiting stenosis or large vessel occlusive stroke.    CT perfusion is negative for large vessel occlusive stroke.    Creatinine 0.90, BUN 8  Glucose 108  H/H 15.2/44  Sodium 141    Assessment/Plan:    This is a 89-year-old female with no known medical diagnoses who presents to the emergency department today for further evaluation of right-sided weakness, facial droop, and dysarthria which was present upon awakening this morning.  She is not a candidate for IV thrombolytic therapy secondary to LKW >4.5 hours.  CTA head/neck and CT perfusion are negative for large vessel occlusive stroke.    Antiplatelet PTA: None  Anticoagulant PTA: None      1. Suspected acute ischemic stroke, left hemispheric, likely related to small vessel disease  -Differential diagnoses include metabolic process versus seizure versus hypertensive urgency  -TIA/CVA order set without thrombolytic therapy has been initiated  -MRI of the brain without contrast  -N.p.o. until SLP evaluation, given patient's dysarthria and prominent facial droop  -Activity as tolerated  -A1c and lipid panel  -TTE  -PT/OT/SLP evaluation  -ASA 81 mg oral or 300 mg rectal  -Atorvastatin 40 mg nightly  -Recommend infectious work-up    Plan of care was discussed with the patient and Dr. Fall.  She will be admitted to the hospital service for further evaluation.  Stroke neurology will continue to follow.  Please call with any questions or concerns.  Thank you for this consult.    Jacinta Alas, APRN  December 15, 2022  09:51 EST

## 2022-12-15 NOTE — H&P
[facility] Medicine Services  HISTORY AND PHYSICAL    Patient Name: Lu Hou  : 1924  MRN: [id number]  Primary Care Physician: Provider, No Known    Subjective   Subjective     Chief Complaint:righ side weakness    HPI:  Lu Hou is a 89 y.o. female who  has a past medical history of Hypertension. she woke up this morning after urinating int he bed. Her son was unable to get her up with her falling to the right. He noticed she has a right facial droop and she came to the ED. In the Ed she was found to be very hypertensive. She had a stroke score of 5. She was evaluated as a code stroke, there was no obvious Large vessel occlusion and she is to be admitted for further evaluation.   She denies any other complaints and reports overall doing very well for a 89 yo woman.    Review of Systems   Patient denies weight loss, headaches, changes in vision, fever, chills, sore throat, shortness of breath, cough, nausea or vomiting, diarrhea, abdominal pain or distension, change in urine output or habits, joint pain, rash, itching or bleeding.    Otherwise complete ROS is negative except as mentioned in the HPI.    Personal History     Past Medical History:   Diagnosis Date   • Hypertension        History reviewed. No pertinent surgical history.    Family History: family history includes No Known Problems in her father and mother.     Social History:  she lives with her son, she does not smoke or use drug or tobacco    Medications:         No Known Allergies    Objective   Objective     Vital Signs:   Temp:  [98 °F (36.7 °C)] 98 °F (36.7 °C)  Heart Rate:  [82-84] 84  Resp:  [18] 18  BP: (178-215)/() 178/90    Constitutional: Awake, alert, interactive and pleasant, dense right facial droop  Eyes: clear sclerae, no conjunctival injection  HENT: NCAT, mucous membranes dry  Neck: no masses or lymphadenopathy, trachea midline  Respiratory: good breath sounds bilaterally,  respirations unlabored  Cardiovascular: RRR, no murmurs appreciated, palpable peripheral pulses  Abdomen:  soft, no HSM or masses palpable, not tender or distended  Musculoskeletal: No peripheral edema, clubbing or cyanosis  Neurologic: Oriented x 3,                      right arm and leg are weaker than the left bu able to oppose gravity                     Cranial Nerves grossly intact, speech appropriate but slurred  Skin: No rashes or jaundice  Psychiatric: Appropriate mood, insight      Result Review:  I have personally reviewed the results from the time of this admission   to 12/15/2022 14:43 EST and agree with these findings:  [x]  Laboratory  [x]  Microbiology  [x]  Radiology  [x]  EKG/Telemetry   []  Cardiology/Vascular   []  Pathology  []  Old records  []  Other:  Most notable findings include: remarkably normal labs, and radiology findings, no afib, left axis deviation and poor r wave progression, she does have intracranial atherosclerosis.      LAB RESULTS:      Lab 12/15/22  1000 12/15/22  0959 12/15/22  0956   WBC  --  5.45  --    HEMOGLOBIN  --  15.4  --    HEMOGLOBIN, POC 15.0  --   --    HEMATOCRIT  --  45.4  --    HEMATOCRIT POC 44  --   --    PLATELETS  --  230  --    NEUTROS ABS  --  3.18  --    IMMATURE GRANS (ABS)  --  0.02  --    LYMPHS ABS  --  1.45  --    MONOS ABS  --  0.55  --    EOS ABS  --  0.20  --    MCV  --  93.2  --    PROTIME  --   --  13.2   APTT  --  28.3  --          Lab 12/15/22  1000 12/15/22  0959   SODIUM  --  142   POTASSIUM  --  4.3   CHLORIDE  --  105   CO2  --  24.0   ANION GAP  --  13.0   BUN  --  8   CREATININE 0.50* 0.55*   EGFR 84.9 83.0   GLUCOSE  --  117*   CALCIUM  --  9.2         Lab 12/15/22  0959   TOTAL PROTEIN 6.8   ALBUMIN 4.30   GLOBULIN 2.5   ALT (SGPT) 17   AST (SGOT) 28   BILIRUBIN 0.7   ALK PHOS 65         Lab 12/15/22  1043 12/15/22  0956   TROPONIN T 0.013  --    PROTIME  --  13.2   INR  --  1.1                 Brief Urine Lab Results  (Last result in  the past 365 days)      Color   Clarity   Blood   Leuk Est   Nitrite   Protein   CREAT   Urine HCG        12/15/22 1059 Yellow   Clear   Trace   Negative   Negative   Negative               COVID19   Date Value Ref Range Status   12/15/2022 Not Detected Not Detected - Ref. Range Final       CT Angiogram Neck    Result Date: 12/15/2022  DATE OF EXAM: 12/15/2022 9:51 AM  PROCEDURE: CT ANGIOGRAM NECK-, CT ANGIOGRAM HEAD W AI ANALYSIS OF LVO-  INDICATIONS: stroke  COMPARISON: No comparisons available.  TECHNIQUE: CTA of the head and CTA of the neck was performed after the intravenous administration of 115 mL of Isovue 370. Reconstructed coronal and sagittal images were also obtained. In addition, a 3-D volume rendered image was obtained after post processing. Automated exposure control and iterative reconstruction methods were used. AI analysis of LVO was utilized for the CTA Head imaging portion of the study.  The radiation dose reduction device was turned on for each scan per the ALARA (As Low as Reasonably Achievable) protocol.  Stenosis measurement was performed by the NASCET or similar method.  FINDINGS: CTA NECK: Included subclavian arteries are tortuous, but normal in caliber.  On the right, no hemodynamically significant common carotid stenosis is seen. There is mild calcified plaque of the ICA origin, but no evidence of hemodynamically significant right ICA or ECA stenosis.  On the left, common, internal and external carotid arteries are normal in caliber. There is trace external carotid artery origin calcification and no evidence of significant atherosclerotic change elsewhere. Sagittal reconstructions appear to show mild smooth narrowing of the distal common carotid and ICA origin, but no plaque is apparent here on the axial thin section images, and again, no evidence of significant stenosis.  There is probably moderate right vertebral artery origin stenosis, images 77 through 81. Right vertebral artery is  nondominant but no other focal narrowing is seen. Left vertebral artery is relatively large, normal in caliber along its length. No significant incidental disease is seen of the included lung apices, superior mediastinum, or neck soft tissues.      Impression: No evidence of hemodynamically significant carotid or left vertebral artery stenosis in the neck. Suspected moderate origin stenosis of the nondominant right vertebral artery.    CTA HEAD: Nondominant, but otherwise normal-appearing right vertebral artery is seen with no focal stenosis. Large left vertebral artery appears normal. Basilar artery appears normal. Posterior cerebral arteries appear normal except for mild irregularity of the distal left posterior cerebral artery, which appears to show mild atherosclerotic disease. There also appears to be a moderate-sized left posterior communicating artery in addition to normal appearing left P1 segment.  There is relatively mild calcification of the cavernous carotid arteries and no evidence of hemodynamically significant carotid stenosis. Anterior and middle cerebral arteries and proximal branches are normal in caliber. There appears to be significant distal M2 level stenosis on the right, axial MIP image 18 series 901, rapid MIP image nine series 904, and milder distal disease. There is a somewhat larger right middle cerebral artery than left, and what appears to be relatively mild distal left M1 stenosis, best seen on axial MIP image 23 series 901, rapid MIP image 10 series 904. No distal branch stenosis is appreciated.  IMPRESSION:  1. Relatively mild atherosclerotic changes of the distal left posterior cerebral artery, and distal left M1 segment. 2. Potentially high-grade right M2 focal stenosis, difficult to characterize by NASCET criteria. Milder distal right M2/M3 atherosclerotic change.  This report was finalized on 12/15/2022 11:20 AM by Dr. Andrea Pfeiffer MD.      XR Chest 1 View    Result Date:  12/15/2022  DATE OF EXAM: 12/15/2022 10:46 AM  PROCEDURE: XR CHEST 1 VW-  INDICATIONS: Acute Stroke Protocol (onset < 12 hrs)  COMPARISON: No comparisons available.  TECHNIQUE: Single radiographic AP view of the chest was obtained.  FINDINGS: Mild chronic changes of the lung fields are present without focal airspace opacity. There is no significant pleural effusion or distinct pneumothorax. Normal heart and mediastinal contours. Right humerus fixation noted.      Impression: Mild chronic interstitial and fibrotic changes of the lung fields without evidence of acute cardiopulmonary abnormality.  This report was finalized on 12/15/2022 11:16 AM by Tony Mazariegos.      CT Head Without Contrast Stroke Protocol    Result Date: 12/15/2022  DATE OF EXAM: 12/15/2022 9:47 AM  PROCEDURE: CT HEAD WO CONTRAST STROKE PROTOCOL-  INDICATIONS: STROKE  COMPARISON: No comparisons available.  TECHNIQUE: Routine transaxial and coronal reconstruction images were obtained through the head without the administration of contrast. Automated exposure control and iterative reconstruction methods were used.  The radiation dose reduction device was turned on for each scan per the ALARA (As Low as Reasonably Achievable) protocol.  FINDINGS: Gray-white differentiation is maintained and there is no evidence of intracranial hemorrhage, mass or mass effect. Age-related changes of the brain are present including volume loss and typical periventricular sequela of chronic small vessel ischemia. There is otherwise no evidence of intracranial hemorrhage, mass or mass effect. The ventricles are normal in size and configuration accounting for surrounding volume loss. The orbits are normal and the paranasal sinuses are grossly clear.      Impression: Age-related changes of the brain as above, otherwise without evidence of acute intracranial abnormality.   Scan performed 9:46 AM 12/15/2022. Results discussed with the stroke team by Tony Mazariegos in  person 9:49 AM same day  This report was finalized on 12/15/2022 11:17 AM by Tony Mazariegos.      CT Angiogram Head w AI Analysis of LVO    Result Date: 12/15/2022  DATE OF EXAM: 12/15/2022 9:51 AM  PROCEDURE: CT ANGIOGRAM NECK-, CT ANGIOGRAM HEAD W AI ANALYSIS OF LVO-  INDICATIONS: stroke  COMPARISON: No comparisons available.  TECHNIQUE: CTA of the head and CTA of the neck was performed after the intravenous administration of 115 mL of Isovue 370. Reconstructed coronal and sagittal images were also obtained. In addition, a 3-D volume rendered image was obtained after post processing. Automated exposure control and iterative reconstruction methods were used. AI analysis of LVO was utilized for the CTA Head imaging portion of the study.  The radiation dose reduction device was turned on for each scan per the ALARA (As Low as Reasonably Achievable) protocol.  Stenosis measurement was performed by the NASCET or similar method.  FINDINGS: CTA NECK: Included subclavian arteries are tortuous, but normal in caliber.  On the right, no hemodynamically significant common carotid stenosis is seen. There is mild calcified plaque of the ICA origin, but no evidence of hemodynamically significant right ICA or ECA stenosis.  On the left, common, internal and external carotid arteries are normal in caliber. There is trace external carotid artery origin calcification and no evidence of significant atherosclerotic change elsewhere. Sagittal reconstructions appear to show mild smooth narrowing of the distal common carotid and ICA origin, but no plaque is apparent here on the axial thin section images, and again, no evidence of significant stenosis.  There is probably moderate right vertebral artery origin stenosis, images 77 through 81. Right vertebral artery is nondominant but no other focal narrowing is seen. Left vertebral artery is relatively large, normal in caliber along its length. No significant incidental disease is seen of  the included lung apices, superior mediastinum, or neck soft tissues.      Impression: No evidence of hemodynamically significant carotid or left vertebral artery stenosis in the neck. Suspected moderate origin stenosis of the nondominant right vertebral artery.    CTA HEAD: Nondominant, but otherwise normal-appearing right vertebral artery is seen with no focal stenosis. Large left vertebral artery appears normal. Basilar artery appears normal. Posterior cerebral arteries appear normal except for mild irregularity of the distal left posterior cerebral artery, which appears to show mild atherosclerotic disease. There also appears to be a moderate-sized left posterior communicating artery in addition to normal appearing left P1 segment.  There is relatively mild calcification of the cavernous carotid arteries and no evidence of hemodynamically significant carotid stenosis. Anterior and middle cerebral arteries and proximal branches are normal in caliber. There appears to be significant distal M2 level stenosis on the right, axial MIP image 18 series 901, rapid MIP image nine series 904, and milder distal disease. There is a somewhat larger right middle cerebral artery than left, and what appears to be relatively mild distal left M1 stenosis, best seen on axial MIP image 23 series 901, rapid MIP image 10 series 904. No distal branch stenosis is appreciated.  IMPRESSION:  1. Relatively mild atherosclerotic changes of the distal left posterior cerebral artery, and distal left M1 segment. 2. Potentially high-grade right M2 focal stenosis, difficult to characterize by NASCET criteria. Milder distal right M2/M3 atherosclerotic change.  This report was finalized on 12/15/2022 11:20 AM by Dr. Andrea Pfeiffer MD.      CT CEREBRAL PERFUSION WITH & WITHOUT CONTRAST    Result Date: 12/15/2022  DATE OF EXAM: 12/15/2022 9:51 AM  PROCEDURE: CT CEREBRAL PERFUSION W WO CONTRAST-  INDICATIONS: stroke  COMPARISON: Head CT scan of same date   "TECHNIQUE: Routine transaxial cuts were obtained through the head without administration of contrast. Routine transaxial cuts were then obtained through the head following the intravenous administration of 115 mL of Isovue 300. Core blood volume, core blood flow, mean transit time, and Tmax images were obtained utilizing the Rapid software protocol. A limited CT angiogram of the head was also performed to measure the blood vessel density.  The radiation dose reduction device was turned on for each scan per the ALARA (As Low as Reasonably Achievable) protocol.  FINDINGS: Rapid analysis is negative with no areas the brain showing cerebral blood flow less than 30% or T-Max greater than 6 seconds. Individual perfusion maps show no consistent focal asymmetry to suggest focal ischemia or infarct.      Impression: Negative perfusion scan.  This report was finalized on 12/15/2022 11:04 AM by Dr. Andrea Pfeiffer MD.            The patient has a COVID HM Topic on their chart, and they are fully vaccinated.    Assessment & Plan   Assessment / Plan       Right sided weakness    Essential hypertension      Assessment & Plan:  89 yo with history of hypertension who had a wake up stroke with right sided weakness  Presumed acute stroke  -aspirin given and will continue as well as statin  - CT of head, CTA of head and neck as mentioned above  - N.p.o. until passes bedside dysphagia screen  - Consult PT/OT/SLP in a.m.  - Bubble echo in a.m.  - MRI brain without contrast  - Check hemoglobin A1c and FLP  - Neurochecks  - Fall precautions  - BP recommendations per stroke team      Permissive hypertension for now    Gently hydrate while NPO      DVT prophylaxis:lovenox      CODE STATUS:She desires to be full code, she said \"why not?\"         Admission Status: INPATIENT status .      Electronically signed by Cheyenne Montalvo MD 12/15/22 14:43 EST          "

## 2022-12-15 NOTE — ED PROVIDER NOTES
Subjective   History of Present Illness  This is a delightful 89-year-old female brought to the emergency room today by [facility] for a possible stroke.  She has had no previous visits here and per EMS she takes no medications regularly and lives with her son and is able to get around using a walker.  She is able to dress herself and needs assistance with bathing.  She feeds her self.    Apparently she was at her baseline when she went to bed last night 12 hours ago but woke up this morning and had urinary incontinence and fell due to right sided weakness.  The exact onset of the timing of this is unclear but her last known normal was 12 hours ago.  Currently she has no complaints of pain from the fall.  She has no headache or visual field changes.  Just she says nothing the matter with her but she does have significant facial droop and right-sided weakness.  She has no chest pain or shortness of breath.  No history of stroke in the past.  Patient is a fair historian reports she does not smoke or drink engage in any [employer]'s activities.        Other systems reviewed and are negative except as noted above.        Review of Systems   All other systems reviewed and are negative.      No past medical history on file.    No Known Allergies    No past surgical history on file.    No family history on file.    Social History     Socioeconomic History   • Marital status:            Objective   Physical Exam  Vitals and nursing note reviewed.   Constitutional:       Comments: Pleasant 89-year-old who is alert she gives a pretty good history she gets the data little bit off she thinks she is in [place] but corrects very easily.  She is in no distress.  She looks a bit thin and frail.   HENT:      Head: Normocephalic and atraumatic.      Right Ear: External ear normal.      Left Ear: External ear normal.      Nose: Nose normal.      Mouth/Throat:      Mouth: Mucous membranes are moist.      Pharynx: Oropharynx  is clear.   Eyes:      Extraocular Movements: Extraocular movements intact.      Conjunctiva/sclera: Conjunctivae normal.      Pupils: Pupils are equal, round, and reactive to light.   Neck:      Vascular: No carotid bruit.   Cardiovascular:      Rate and Rhythm: Normal rate and regular rhythm.      Pulses: Normal pulses.      Heart sounds: Normal heart sounds.   Pulmonary:      Effort: Pulmonary effort is normal.      Breath sounds: Normal breath sounds.   Abdominal:      Comments: Thin soft and nontender without organomegaly, masses, or guarding.    Her chest thoracic and lumbosacral spine are nontender to palpation.  Pelvis nontender to palpation.   Genitourinary:     Comments: Her gene smell of urine.  Musculoskeletal:      Cervical back: Normal range of motion and neck supple. No rigidity or tenderness.      Comments: Equal pulses in the upper extremities with chronic arthritic changes no point tenderness neurovascular intact.  Lower extremities show no point tenderness neurovascularly intact no venous cords or edema.   Lymphadenopathy:      Cervical: No cervical adenopathy.   Skin:     General: Skin is warm and dry.      Capillary Refill: Capillary refill takes less than 2 seconds.   Neurological:      Comments: Face is asymmetric with a right-sided droop.  Voice is soft tongue is midline.  Vision hearing and speech are preserved.  She has moderate right-sided weakness she can lift a little bit against gravity but decreased   strength on the right.  Right leg she can lift against gravity is little bit stronger than her right arm.  Left side moves fairly well.  Knee jerks 1+ bilaterally.  Plantar response equivocal bilaterally.         Procedures           ED Course            Recent Results (from the past 24 hour(s))   POC Protime / INR    Collection Time: 12/15/22  9:56 AM    Specimen: Blood   Result Value Ref Range    Protime 13.2 12.8 - 15.2 seconds    INR 1.1 0.8 - 1.2   aPTT    Collection Time:  12/15/22  9:59 AM    Specimen: Blood   Result Value Ref Range    PTT 28.3 22.0 - 39.0 seconds   Lavender Top    Collection Time: 12/15/22  9:59 AM   Result Value Ref Range    Extra Tube hold for add-on    Gold Top - SST    Collection Time: 12/15/22  9:59 AM   Result Value Ref Range    Extra Tube Hold for add-ons.    Gray Top    Collection Time: 12/15/22  9:59 AM   Result Value Ref Range    Extra Tube Hold for add-ons.    Light Blue Top    Collection Time: 12/15/22  9:59 AM   Result Value Ref Range    Extra Tube Hold for add-ons.    CBC Auto Differential    Collection Time: 12/15/22  9:59 AM    Specimen: Blood   Result Value Ref Range    WBC 5.45 3.40 - 10.80 10*3/mm3    RBC 4.87 3.77 - 5.28 10*6/mm3    Hemoglobin 15.4 12.0 - 15.9 g/dL    Hematocrit 45.4 34.0 - 46.6 %    MCV 93.2 79.0 - 97.0 fL    MCH 31.6 26.6 - 33.0 pg    MCHC 33.9 31.5 - 35.7 g/dL    RDW 13.6 12.3 - 15.4 %    RDW-SD 46.3 37.0 - 54.0 fl    MPV 9.9 6.0 - 12.0 fL    Platelets 230 140 - 450 10*3/mm3    Neutrophil % 58.3 42.7 - 76.0 %    Lymphocyte % 26.6 19.6 - 45.3 %    Monocyte % 10.1 5.0 - 12.0 %    Eosinophil % 3.7 0.3 - 6.2 %    Basophil % 0.9 0.0 - 1.5 %    Immature Grans % 0.4 0.0 - 0.5 %    Neutrophils, Absolute 3.18 1.70 - 7.00 10*3/mm3    Lymphocytes, Absolute 1.45 0.70 - 3.10 10*3/mm3    Monocytes, Absolute 0.55 0.10 - 0.90 10*3/mm3    Eosinophils, Absolute 0.20 0.00 - 0.40 10*3/mm3    Basophils, Absolute 0.05 0.00 - 0.20 10*3/mm3    Immature Grans, Absolute 0.02 0.00 - 0.05 10*3/mm3    nRBC 0.0 0.0 - 0.2 /100 WBC   Comprehensive Metabolic Panel    Collection Time: 12/15/22  9:59 AM    Specimen: Blood   Result Value Ref Range    Glucose 117 (H) 65 - 99 mg/dL    BUN 8 8 - 23 mg/dL    Creatinine 0.55 (L) 0.57 - 1.00 mg/dL    Sodium 142 136 - 145 mmol/L    Potassium 4.3 3.5 - 5.2 mmol/L    Chloride 105 98 - 107 mmol/L    CO2 24.0 22.0 - 29.0 mmol/L    Calcium 9.2 8.2 - 9.6 mg/dL    Total Protein 6.8 6.0 - 8.5 g/dL    Albumin 4.30 3.50 - 5.20  g/dL    ALT (SGPT) 17 1 - 33 U/L    AST (SGOT) 28 1 - 32 U/L    Alkaline Phosphatase 65 39 - 117 U/L    Total Bilirubin 0.7 0.0 - 1.2 mg/dL    Globulin 2.5 gm/dL    A/G Ratio 1.7 g/dL    BUN/Creatinine Ratio 14.5 7.0 - 25.0    Anion Gap 13.0 5.0 - 15.0 mmol/L    eGFR 83.0 >60.0 mL/min/1.73   POC CHEM 8    Collection Time: 12/15/22 10:00 AM    Specimen: Blood   Result Value Ref Range    Glucose 108 70 - 130 mg/dL    BUN 8 8 - 26 mg/dL    Creatinine 0.50 (L) 0.60 - 1.30 mg/dL    Sodium 141 138 - 146 mmol/L    POC Potassium 3.9 3.5 - 4.9 mmol/L    Chloride 104 98 - 109 mmol/L    Total CO2 25 24 - 29 mmol/L    Hemoglobin 15.0 12.0 - 17.0 g/dL    Hematocrit 44 38 - 51 %    Ionized Calcium 1.15 (L) 1.20 - 1.32 mmol/L    eGFR 84.9 >60.0 mL/min/1.73   COVID-19 and FLU A/B PCR - Swab, Nasopharynx    Collection Time: 12/15/22 10:20 AM    Specimen: Nasopharynx; Swab   Result Value Ref Range    COVID19 Not Detected Not Detected - Ref. Range    Influenza A PCR Not Detected Not Detected    Influenza B PCR Not Detected Not Detected   ECG 12 Lead ED Triage Standing Order; Acute Stroke (Onset <24 hrs)    Collection Time: 12/15/22 10:33 AM   Result Value Ref Range    QT Interval 394 ms    QTC Interval 476 ms   Troponin    Collection Time: 12/15/22 10:43 AM    Specimen: Blood   Result Value Ref Range    Troponin T 0.013 0.000 - 0.030 ng/mL   Green Top (Gel)    Collection Time: 12/15/22 10:43 AM   Result Value Ref Range    Extra Tube Hold for add-ons.    Urinalysis With Microscopic If Indicated (No Culture) - Urine, Clean Catch    Collection Time: 12/15/22 10:59 AM    Specimen: Urine, Clean Catch   Result Value Ref Range    Color, UA Yellow Yellow, Straw    Appearance, UA Clear Clear    pH, UA 8.0 5.0 - 8.0    Specific Gravity, UA 1.024 1.001 - 1.030    Glucose, UA Negative Negative    Ketones, UA Trace (A) Negative    Bilirubin, UA Negative Negative    Blood, UA Trace (A) Negative    Protein, UA Negative Negative    Leuk Esterase, UA  Negative Negative    Nitrite, UA Negative Negative    Urobilinogen, UA 0.2 E.U./dL 0.2 - 1.0 E.U./dL   Urinalysis, Microscopic Only - Urine, Clean Catch    Collection Time: 12/15/22 10:59 AM    Specimen: Urine, Clean Catch   Result Value Ref Range    RBC, UA 3-6 (A) None Seen, 0-2 /HPF    WBC, UA None Seen None Seen, 0-2 /HPF    Bacteria, UA None Seen None Seen, Trace /HPF    Squamous Epithelial Cells, UA 0-2 None Seen, 0-2 /HPF    Hyaline Casts, UA None Seen 0 - 6 /LPF    Methodology Automated Microscopy      Note: In addition to lab results from this visit, the labs listed above may include labs taken at another facility or during a different encounter within the last 24 hours. Please correlate lab times with ED admission and discharge times for further clarification of the services performed during this visit.    XR Chest 1 View   Final Result   Mild chronic interstitial and fibrotic changes of the lung   fields without evidence of acute cardiopulmonary abnormality.        This report was finalized on 12/15/2022 11:16 AM by Tony Mazariegos.          CT CEREBRAL PERFUSION WITH & WITHOUT CONTRAST   Final Result   Negative perfusion scan.       This report was finalized on 12/15/2022 11:04 AM by Dr. Andrea Pfeiffer MD.          CT Angiogram Head w AI Analysis of LVO   Final Result   No evidence of hemodynamically significant carotid or left   vertebral artery stenosis in the neck. Suspected moderate origin   stenosis of the nondominant right vertebral artery.               CTA HEAD: Nondominant, but otherwise normal-appearing right vertebral   artery is seen with no focal stenosis. Large left vertebral artery   appears normal. Basilar artery appears normal. Posterior cerebral   arteries appear normal except for mild irregularity of the distal left   posterior cerebral artery, which appears to show mild atherosclerotic   disease. There also appears to be a moderate-sized left posterior   communicating artery in addition  to normal appearing left P1 segment.       There is relatively mild calcification of the cavernous carotid arteries   and no evidence of hemodynamically significant carotid stenosis.   Anterior and middle cerebral arteries and proximal branches are normal   in caliber. There appears to be significant distal M2 level stenosis on   the right, axial MIP image 18 series 901, rapid MIP image nine series   904, and milder distal disease. There is a somewhat larger right middle   cerebral artery than left, and what appears to be relatively mild distal   left M1 stenosis, best seen on axial MIP image 23 series 901, rapid MIP   image 10 series 904. No distal branch stenosis is appreciated.       IMPRESSION:       1. Relatively mild atherosclerotic changes of the distal left posterior   cerebral artery, and distal left M1 segment.   2. Potentially high-grade right M2 focal stenosis, difficult to   characterize by NASCET criteria. Milder distal right M2/M3   atherosclerotic change.       This report was finalized on 12/15/2022 11:20 AM by Dr. Andrea Pfeiffer MD.          CT Angiogram Neck   Final Result   No evidence of hemodynamically significant carotid or left   vertebral artery stenosis in the neck. Suspected moderate origin   stenosis of the nondominant right vertebral artery.               CTA HEAD: Nondominant, but otherwise normal-appearing right vertebral   artery is seen with no focal stenosis. Large left vertebral artery   appears normal. Basilar artery appears normal. Posterior cerebral   arteries appear normal except for mild irregularity of the distal left   posterior cerebral artery, which appears to show mild atherosclerotic   disease. There also appears to be a moderate-sized left posterior   communicating artery in addition to normal appearing left P1 segment.       There is relatively mild calcification of the cavernous carotid arteries   and no evidence of hemodynamically significant carotid stenosis.   Anterior  "and middle cerebral arteries and proximal branches are normal   in caliber. There appears to be significant distal M2 level stenosis on   the right, axial MIP image 18 series 901, rapid MIP image nine series   904, and milder distal disease. There is a somewhat larger right middle   cerebral artery than left, and what appears to be relatively mild distal   left M1 stenosis, best seen on axial MIP image 23 series 901, rapid MIP   image 10 series 904. No distal branch stenosis is appreciated.       IMPRESSION:       1. Relatively mild atherosclerotic changes of the distal left posterior   cerebral artery, and distal left M1 segment.   2. Potentially high-grade right M2 focal stenosis, difficult to   characterize by NASCET criteria. Milder distal right M2/M3   atherosclerotic change.       This report was finalized on 12/15/2022 11:20 AM by Dr. Andrea Pfeiffer MD.          CT Head Without Contrast Stroke Protocol   Final Result   Age-related changes of the brain as above, otherwise without   evidence of acute intracranial abnormality.           Scan performed 9:46 AM 12/15/2022. Results discussed with the stroke   team by Tony Mazariegos in person 9:49 AM same day       This report was finalized on 12/15/2022 11:17 AM by Tony Mazariegos.            Vitals:    12/15/22 0949 12/15/22 0957 12/15/22 1140 12/15/22 1141   BP:  (!) 215/118 178/90    BP Location:  Right arm Right arm    Patient Position:  Lying Lying    Pulse:  82 84 84   Resp:  18     Temp:  98 °F (36.7 °C)     TempSrc:  Oral     SpO2:  95% 92% 93%   Weight: 60 kg (132 lb 4.4 oz)      Height: 157.5 cm (62\")        Medications   sodium chloride 0.9 % flush 10 mL (has no administration in time range)   aspirin chewable tablet 81 mg ( Oral Not Given:  See Alt 12/15/22 1217)     Or   aspirin suppository 300 mg (300 mg Rectal Given 12/15/22 1217)   iopamidol (ISOVUE-370) 76 % injection 115 mL (115 mL Intravenous Given 12/15/22 1010)     ECG/EMG Results (last 24 " hours)     ** No results found for the last 24 hours. **        ECG 12 Lead ED Triage Standing Order; Acute Stroke (Onset <24 hrs)   Final Result   Test Reason : ED Triage Standing Order~   Blood Pressure :   */*   mmHG   Vent. Rate :  88 BPM     Atrial Rate :  88 BPM      P-R Int : 148 ms          QRS Dur :  72 ms       QT Int : 394 ms       P-R-T Axes :  40 -15  22 degrees      QTc Int : 476 ms      Normal sinus rhythm with sinus arrhythmia   prwp , age undetermined   Abnormal ECG   No previous ECGs available   Confirmed by WADE ADAMS MD ([id number]) on 12/15/2022 11:13:27 AM      Referred By: EDMD           Confirmed By: WADE ADAMS MD                                          MDM  Number of Diagnoses or Management Options  Acute right-sided weakness  Hypertensive urgency  Diagnosis management comments:       I have reviewed all available studies at the bedside.  I have reexamined the patient.  She still has persistent right-sided weakness and facial droop.    I suspect she has had a lacunar infarct.  No evidence of high-grade stenosis on her CTAs of the head or neck.    She has been seen by our stroke team and started on aspirin.  Her blood pressure is quite elevated but they like to go ahead and exercise permissive hypertension at this point we will titrated after the acute period.  To the timeframe she is not a candidate for TNKase or any neuro intervention.    I spoke Dr. Montalvo, on-call hospital medicine, and she will admit the patient.    Are agreeable with the plan       Amount and/or Complexity of Data Reviewed  Clinical lab tests: reviewed  Tests in the medicine section of CPT®: reviewed        Final diagnoses:   Acute right-sided weakness   Hypertensive urgency       ED Disposition  ED Disposition     ED Disposition   Decision to Admit    Condition   --    Comment   Level of Care: Telemetry [5]   Diagnosis: Right sided weakness [512372]   Admitting Physician: QUE MONTALVO [[id number]]   Attending Physician:  QUE BARGER [[id number]]   Certification: I Certify That Inpatient Hospital Services Are Medically Necessary For Greater Than 2 Midnights               No follow-up provider specified.       Medication List      No changes were made to your prescriptions during this visit.          Ramy Fall MD  12/15/22 8311

## 2022-12-16 ENCOUNTER — APPOINTMENT (OUTPATIENT)
Dept: CARDIOLOGY | Facility: HOSPITAL | Age: 87
End: 2022-12-16

## 2022-12-16 ENCOUNTER — APPOINTMENT (OUTPATIENT)
Dept: MRI IMAGING | Facility: HOSPITAL | Age: 87
End: 2022-12-16

## 2022-12-16 ENCOUNTER — APPOINTMENT (OUTPATIENT)
Dept: GENERAL RADIOLOGY | Facility: HOSPITAL | Age: 87
End: 2022-12-16

## 2022-12-16 LAB
BH CV ECHO MEAS - AO MAX PG: 10.4 MMHG
BH CV ECHO MEAS - AO MEAN PG: 7 MMHG
BH CV ECHO MEAS - AO ROOT DIAM: 3.1 CM
BH CV ECHO MEAS - AO V2 MAX: 161 CM/SEC
BH CV ECHO MEAS - AO V2 VTI: 29.6 CM
BH CV ECHO MEAS - AVA(I,D): 1.62 CM2
BH CV ECHO MEAS - EDV(CUBED): 22 ML
BH CV ECHO MEAS - EDV(MOD-SP2): 92.4 ML
BH CV ECHO MEAS - EDV(MOD-SP4): 81.6 ML
BH CV ECHO MEAS - EF(MOD-BP): 62.2 %
BH CV ECHO MEAS - EF(MOD-SP2): 63.6 %
BH CV ECHO MEAS - EF(MOD-SP4): 63.1 %
BH CV ECHO MEAS - ESV(CUBED): 6.9 ML
BH CV ECHO MEAS - ESV(MOD-SP2): 33.6 ML
BH CV ECHO MEAS - ESV(MOD-SP4): 30.1 ML
BH CV ECHO MEAS - FS: 32.1 %
BH CV ECHO MEAS - IVS/LVPW: 1.3 CM
BH CV ECHO MEAS - IVSD: 1.3 CM
BH CV ECHO MEAS - LA DIMENSION: 3.8 CM
BH CV ECHO MEAS - LAT PEAK E' VEL: 6.7 CM/SEC
BH CV ECHO MEAS - LV MASS(C)D: 92.7 GRAMS
BH CV ECHO MEAS - LV MAX PG: 4.4 MMHG
BH CV ECHO MEAS - LV MEAN PG: 2 MMHG
BH CV ECHO MEAS - LV V1 MAX: 105 CM/SEC
BH CV ECHO MEAS - LV V1 VTI: 18.8 CM
BH CV ECHO MEAS - LVIDD: 2.8 CM
BH CV ECHO MEAS - LVIDS: 1.9 CM
BH CV ECHO MEAS - LVOT AREA: 2.5 CM2
BH CV ECHO MEAS - LVOT DIAM: 1.8 CM
BH CV ECHO MEAS - LVPWD: 1 CM
BH CV ECHO MEAS - MED PEAK E' VEL: 4.5 CM/SEC
BH CV ECHO MEAS - MV A MAX VEL: 129 CM/SEC
BH CV ECHO MEAS - MV DEC SLOPE: 354 CM/SEC2
BH CV ECHO MEAS - MV E MAX VEL: 64.5 CM/SEC
BH CV ECHO MEAS - MV E/A: 0.5
BH CV ECHO MEAS - MV MAX PG: 8.5 MMHG
BH CV ECHO MEAS - MV MEAN PG: 2 MMHG
BH CV ECHO MEAS - MV P1/2T: 66.4 MSEC
BH CV ECHO MEAS - MV V2 VTI: 24.6 CM
BH CV ECHO MEAS - MVA(P1/2T): 3.3 CM2
BH CV ECHO MEAS - MVA(VTI): 1.94 CM2
BH CV ECHO MEAS - PA ACC TIME: 0.14 SEC
BH CV ECHO MEAS - PA PR(ACCEL): 14.2 MMHG
BH CV ECHO MEAS - RAP SYSTOLE: 8 MMHG
BH CV ECHO MEAS - RVSP: 27 MMHG
BH CV ECHO MEAS - SV(LVOT): 47.8 ML
BH CV ECHO MEAS - SV(MOD-SP2): 58.8 ML
BH CV ECHO MEAS - SV(MOD-SP4): 51.5 ML
BH CV ECHO MEAS - TAPSE (>1.6): 1.26 CM
BH CV ECHO MEAS - TR MAX PG: 19 MMHG
BH CV ECHO MEAS - TR MAX VEL: 218 CM/SEC
BH CV ECHO MEASUREMENTS AVERAGE E/E' RATIO: 11.52
BH CV VAS BP LEFT ARM: NORMAL MMHG
BH CV XLRA - RV BASE: 4.3 CM
BH CV XLRA - RV LENGTH: 5.7 CM
BH CV XLRA - RV MID: 3.3 CM
BH CV XLRA - TDI S': 15.7 CM/SEC
CHOLEST SERPL-MCNC: 136 MG/DL (ref 0–200)
GLUCOSE BLDC GLUCOMTR-MCNC: 92 MG/DL (ref 70–130)
GLUCOSE BLDC GLUCOMTR-MCNC: 94 MG/DL (ref 70–130)
HBA1C MFR BLD: 5.3 % (ref 4.8–5.6)
HDLC SERPL-MCNC: 80 MG/DL (ref 40–60)
LDLC SERPL CALC-MCNC: 44 MG/DL (ref 0–100)
LDLC/HDLC SERPL: 0.57 {RATIO}
LEFT ATRIUM VOLUME INDEX: 34.4 ML/M2
MAXIMAL PREDICTED HEART RATE: 122 BPM
STRESS TARGET HR: 104 BPM
TRIGL SERPL-MCNC: 53 MG/DL (ref 0–150)
VLDLC SERPL-MCNC: 12 MG/DL (ref 5–40)

## 2022-12-16 PROCEDURE — 99232 SBSQ HOSP IP/OBS MODERATE 35: CPT | Performed by: PEDIATRICS

## 2022-12-16 PROCEDURE — 83036 HEMOGLOBIN GLYCOSYLATED A1C: CPT

## 2022-12-16 PROCEDURE — 97110 THERAPEUTIC EXERCISES: CPT

## 2022-12-16 PROCEDURE — 97535 SELF CARE MNGMENT TRAINING: CPT

## 2022-12-16 PROCEDURE — 93306 TTE W/DOPPLER COMPLETE: CPT | Performed by: INTERNAL MEDICINE

## 2022-12-16 PROCEDURE — 99233 SBSQ HOSP IP/OBS HIGH 50: CPT | Performed by: STUDENT IN AN ORGANIZED HEALTH CARE EDUCATION/TRAINING PROGRAM

## 2022-12-16 PROCEDURE — 97166 OT EVAL MOD COMPLEX 45 MIN: CPT

## 2022-12-16 PROCEDURE — 97162 PT EVAL MOD COMPLEX 30 MIN: CPT

## 2022-12-16 PROCEDURE — 74230 X-RAY XM SWLNG FUNCJ C+: CPT

## 2022-12-16 PROCEDURE — 92611 MOTION FLUOROSCOPY/SWALLOW: CPT

## 2022-12-16 PROCEDURE — 97530 THERAPEUTIC ACTIVITIES: CPT

## 2022-12-16 PROCEDURE — 80061 LIPID PANEL: CPT

## 2022-12-16 PROCEDURE — 82962 GLUCOSE BLOOD TEST: CPT

## 2022-12-16 PROCEDURE — 70551 MRI BRAIN STEM W/O DYE: CPT

## 2022-12-16 PROCEDURE — 93306 TTE W/DOPPLER COMPLETE: CPT

## 2022-12-16 RX ORDER — ASPIRIN 325 MG
325 TABLET, DELAYED RELEASE (ENTERIC COATED) ORAL DAILY
Status: DISCONTINUED | OUTPATIENT
Start: 2022-12-17 | End: 2022-12-20 | Stop reason: HOSPADM

## 2022-12-16 RX ORDER — MULTIPLE VITAMINS W/ MINERALS TAB 9MG-400MCG
1 TAB ORAL 2 TIMES DAILY
Status: ON HOLD | COMMUNITY
End: 2023-01-20 | Stop reason: SDUPTHER

## 2022-12-16 RX ADMIN — ATORVASTATIN CALCIUM 40 MG: 40 TABLET, FILM COATED ORAL at 20:41

## 2022-12-16 RX ADMIN — Medication 10 ML: at 09:11

## 2022-12-16 RX ADMIN — BARIUM SULFATE 100 ML: 0.81 POWDER, FOR SUSPENSION ORAL at 10:52

## 2022-12-16 RX ADMIN — BARIUM SULFATE 20 ML: 400 PASTE ORAL at 10:52

## 2022-12-16 RX ADMIN — ASPIRIN 81 MG: 81 TABLET, CHEWABLE ORAL at 09:11

## 2022-12-16 NOTE — PLAN OF CARE
Goal Outcome Evaluation:  Plan of Care Reviewed With: patient        Progress: no change  Outcome Evaluation: OT evaluation completed with patient demonstrating impaired strength, balance, endurance, ROM, coordinatin, sensation and cognition impacting BADL independence and related transfers.  Pt. appropriate for continued skilled OT services to address deficit areas.  Pt. grossly min A sitting balance, mod standing balance, min A self feeding, mod to max A grooming and dependence LBD.  Recommend SNF at discharge.

## 2022-12-16 NOTE — PLAN OF CARE
Goal Outcome Evaluation:  Plan of Care Reviewed With: patient, son           Outcome Evaluation: Pt. presents with generalized weakness, balance impairments, R inattention and R sided weakness affecting her ability to safely participate in functional mobility. Pt. performed bed mobility with mod assist of 2. She performed sit to stand transfer w/mod assist of 2 and bed to chair transfer w/max assist of 2. Deferred ambulation secondary to patient being transported for test. Recommend IPR upon discharge.

## 2022-12-16 NOTE — PROGRESS NOTES
Stroke Progress Note       Chief Complaint: slurred speech and right side weakness    Subjective    Subjective     Subjective:  Patient continues to have slurred speech     Review of Systems   UTO    Objective      Temp:  [97.6 °F (36.4 °C)-98 °F (36.7 °C)] 97.7 °F (36.5 °C)  Heart Rate:  [] 89  Resp:  [15-17] 17  BP: (157-192)/() 161/89     NEURO      LANG/SPEECH altered     CRANIAL NERVES:    Pupils equal and reactive, EOMI intact, no gaze deviation, no nystagmus  R facial droop, cough and gag intact, shoulder shrug intact, tongue midline     MOTOR:  RUE and RLE 3/5    SENSORY:withdraws to pain on all extremities     COORDINATION: Not assessed due to patient condition    STATION: Not assessed due to patient condition    GAIT: Not assessed due to patient condition    Results Review:    I reviewed the patient's new clinical results.    Lab Results (last 24 hours)     Procedure Component Value Units Date/Time    Hemoglobin A1c [[id number]]  (Normal) Collected: 12/16/22 0533    Specimen: Blood Updated: 12/16/22 0715     Hemoglobin A1C 5.30 %     Narrative:      Hemoglobin A1C Ranges:    Increased Risk for Diabetes  5.7% to 6.4%  Diabetes                     >= 6.5%  Diabetic Goal                < 7.0%    Lipid Panel [[id number]]  (Abnormal) Collected: 12/16/22 0533    Specimen: Blood Updated: 12/16/22 0649     Total Cholesterol 136 mg/dL      Triglycerides 53 mg/dL      HDL Cholesterol 80 mg/dL      LDL Cholesterol  44 mg/dL      VLDL Cholesterol 12 mg/dL      LDL/HDL Ratio 0.57    Narrative:      Cholesterol Reference Ranges  ([place] Department of Health and Human Services ATP III Classifications)    Desirable          <200 mg/dL  Borderline High    200-239 mg/dL  High Risk          >240 mg/dL      Triglyceride Reference Ranges  (U.S. Department of Health and Human Services ATP III Classifications)    Normal           <150 mg/dL  Borderline High  150-199 mg/dL  High             200-499 mg/dL  Very High         >500 mg/dL    HDL Reference Ranges  (U.S. Department of Health and Human Services ATP III Classifications)    Low     <40 mg/dl (major risk factor for CHD)  High    >60 mg/dl ('negative' risk factor for CHD)        LDL Reference Ranges  (U.S. Department of Health and Human Services ATP III Classifications)    Optimal          <100 mg/dL  Near Optimal     100-129 mg/dL  Borderline High  130-159 mg/dL  High             160-189 mg/dL  Very High        >189 mg/dL    POC Glucose Once [[id number]]  (Normal) Collected: 12/16/22 0554    Specimen: Blood Updated: 12/16/22 0609     Glucose 94 mg/dL      Comment: Meter: [id number] : [id number] [employer]       POC Glucose Once [[id number]]  (Normal) Collected: 12/16/22 0032    Specimen: Blood Updated: 12/16/22 0034     Glucose 92 mg/dL      Comment: Meter: [id number] : [id number] [employer]           CT Angiogram Neck    Result Date: 12/15/2022  No evidence of hemodynamically significant carotid or left vertebral artery stenosis in the neck. Suspected moderate origin stenosis of the nondominant right vertebral artery.    CTA HEAD: Nondominant, but otherwise normal-appearing right vertebral artery is seen with no focal stenosis. Large left vertebral artery appears normal. Basilar artery appears normal. Posterior cerebral arteries appear normal except for mild irregularity of the distal left posterior cerebral artery, which appears to show mild atherosclerotic disease. There also appears to be a moderate-sized left posterior communicating artery in addition to normal appearing left P1 segment.  There is relatively mild calcification of the cavernous carotid arteries and no evidence of hemodynamically significant carotid stenosis. Anterior and middle cerebral arteries and proximal branches are normal in caliber. There appears to be significant distal M2 level stenosis on the right, axial MIP image 18 series 901, rapid MIP image nine series 904, and milder distal  disease. There is a somewhat larger right middle cerebral artery than left, and what appears to be relatively mild distal left M1 stenosis, best seen on axial MIP image 23 series 901, rapid MIP image 10 series 904. No distal branch stenosis is appreciated.  IMPRESSION:  1. Relatively mild atherosclerotic changes of the distal left posterior cerebral artery, and distal left M1 segment. 2. Potentially high-grade right M2 focal stenosis, difficult to characterize by NASCET criteria. Milder distal right M2/M3 atherosclerotic change.  This report was finalized on 12/15/2022 11:20 AM by Dr. Andrea Pfeiffer MD.      MRI Brain Without Contrast    Result Date: 12/16/2022  Small area of acute infarct present within the left putamen, without associated hemorrhage or significant mass effect.  Age-related changes are otherwise present as above.  This report was finalized on 12/16/2022 8:48 AM by Tony Mazariegos.      XR Chest 1 View    Result Date: 12/15/2022  Mild chronic interstitial and fibrotic changes of the lung fields without evidence of acute cardiopulmonary abnormality.  This report was finalized on 12/15/2022 11:16 AM by Tony Mazariegos.      CT Head Without Contrast Stroke Protocol    Result Date: 12/15/2022  Age-related changes of the brain as above, otherwise without evidence of acute intracranial abnormality.   Scan performed 9:46 AM 12/15/2022. Results discussed with the stroke team by Tony Mazariegos in person 9:49 AM same day  This report was finalized on 12/15/2022 11:17 AM by Tony Mazariegos.      CT Angiogram Head w AI Analysis of LVO    Result Date: 12/15/2022  No evidence of hemodynamically significant carotid or left vertebral artery stenosis in the neck. Suspected moderate origin stenosis of the nondominant right vertebral artery.    CTA HEAD: Nondominant, but otherwise normal-appearing right vertebral artery is seen with no focal stenosis. Large left vertebral artery appears normal. Basilar artery appears  normal. Posterior cerebral arteries appear normal except for mild irregularity of the distal left posterior cerebral artery, which appears to show mild atherosclerotic disease. There also appears to be a moderate-sized left posterior communicating artery in addition to normal appearing left P1 segment.  There is relatively mild calcification of the cavernous carotid arteries and no evidence of hemodynamically significant carotid stenosis. Anterior and middle cerebral arteries and proximal branches are normal in caliber. There appears to be significant distal M2 level stenosis on the right, axial MIP image 18 series 901, rapid MIP image nine series 904, and milder distal disease. There is a somewhat larger right middle cerebral artery than left, and what appears to be relatively mild distal left M1 stenosis, best seen on axial MIP image 23 series 901, rapid MIP image 10 series 904. No distal branch stenosis is appreciated.  IMPRESSION:  1. Relatively mild atherosclerotic changes of the distal left posterior cerebral artery, and distal left M1 segment. 2. Potentially high-grade right M2 focal stenosis, difficult to characterize by NASCET criteria. Milder distal right M2/M3 atherosclerotic change.  This report was finalized on 12/15/2022 11:20 AM by Dr. Andrea Pfeiffer MD.      CT CEREBRAL PERFUSION WITH & WITHOUT CONTRAST    Result Date: 12/15/2022  Negative perfusion scan.  This report was finalized on 12/15/2022 11:04 AM by Dr. Andrea Pfeiffer MD.                Assessment/Plan     Assessment/Plan:  Acute ischemic stroke, left basal ganglia, likely etiology small vessel disease   -Continue full dose of aspirin for now.  -PT OT speech can continue to work with patient.    -Transthoracic echo pending      Essential hypertension-normal blood pressure goals.    Hyperlipidemia-continue high-dose of statin Lipitor of 40    Neurology will continue to follow the patient.    Frederick Mcclelland MD  12/16/22  14:21 EST

## 2022-12-16 NOTE — NURSING NOTE
Wedding band removed from pt's finger r/t swelling. Pt did not want it sent to security. Ring placed in a tele box bag and pinned to pt's gowned.

## 2022-12-16 NOTE — THERAPY EVALUATION
Patient Name: uL Hou  : 1924    MRN: [id number]                              Today's Date: 2022       Admit Date: 12/15/2022    Visit Dx:     ICD-10-CM ICD-9-CM   1. Acute right-sided weakness  R53.1 728.87   2. Hypertensive urgency  I16.0 401.9   3. Dysarthria  R47.1 784.51   4. Dysphagia, unspecified type  R13.10 787.20     Patient Active Problem List   Diagnosis   • Right sided weakness   • Essential hypertension     Past Medical History:   Diagnosis Date   • Hypertension      History reviewed. No pertinent surgical history.   General Information     Row Name 22 0920          OT Time and Intention    Document Type evaluation  -CAR     Mode of Treatment individual therapy;occupational therapy  -     Row Name 22 1866          General Information    Patient Profile Reviewed yes  -CAR     Prior Level of Function independent:;feeding;grooming;dressing;bathing;transfer;min assist:;gait;dependent:;home management;cooking;cleaning;driving;shopping  per pt. report, she reports only sponge bathing, question when this was actual level of independence  -CAR     Existing Precautions/Restrictions fall;other (see comments)  R sided weakness  -CAR     Barriers to Rehab previous functional deficit;cognitive status;impaired sensation;medically complex;visual deficit  -     Row Name 22 8742          Occupational Profile    Environmental Supports and Barriers (Occupational Profile) pt limited historian with information, per chart pt. with cane and rx wx  -     Row Name 22 134          Living Environment    People in Home child(tia), adult  son, per pt. her son is retired  -CAR     Row Name 22 134          Stairs Within Home, Primary    Stairs, Within Home, Primary see PT note for stairs  -CAR     Row Name 22 5376          Cognition    Orientation Status (Cognition) oriented to;person;disoriented to;place;situation;time  -     Row Name 22 4564          Safety  Issues, Functional Mobility    Safety Issues Affecting Function (Mobility) awareness of need for assistance;insight into deficits/self-awareness;judgment;problem-solving;safety precaution awareness;safety precautions follow-through/compliance  -CAR     Impairments Affecting Function (Mobility) balance;cognition;coordination;endurance/activity tolerance;grasp;motor control;postural/trunk control;range of motion (ROM);sensation/sensory awareness;strength  -CAR           User Key  (r) = Recorded By, (t) = Taken By, (c) = Cosigned By    Initials Name Provider Type    Alyssa Carreno OT Occupational Therapist                 Mobility/ADL's     Row Name 12/16/22 1044          Bed Mobility    Bed Mobility sit-supine  -CAR     Scooting/Bridging [place] (Bed Mobility) maximum assist (25% patient effort);dependent (less than 25% patient effort);2 person assist  max to fully scoot hips to EOB and dependent of 2 to HOB  -CAR     Supine-Sit [place] (Bed Mobility) moderate assist (50% patient effort);verbal cues  -CAR     Sit-Supine [place] (Bed Mobility) maximum assist (25% patient effort);2 person assist;verbal cues  -CAR     Bed Mobility, Safety Issues decreased use of arms for pushing/pulling;decreased use of legs for bridging/pushing;impaired trunk control for bed mobility  -     Assistive Device (Bed Mobility) bed rails;draw sheet;head of bed elevated  -     Comment, (Bed Mobility) most assist side to sit off HOB to move supine to sit  -     Row Name 12/16/22 7731          Transfers    Transfers sit-stand transfer;stand-sit transfer  -     Row Name 12/16/22 9418          Sit-Stand Transfer    Sit-Stand [place] (Transfers) moderate assist (50% patient effort);verbal cues  -     Assistive Device (Sit-Stand Transfers) other (see comments)  -CAR     Comment, (Sit-Stand Transfer) JUVENCIO supportSHARONDA on bed railing  -     Row Name 12/16/22 9195          Stand-Sit Transfer    Stand-Sit [place]  (Transfers) moderate assist (50% patient effort);verbal cues  -     Assistive Device (Stand-Sit Transfers) other (see comments)  -CAR     Comment, (Stand-Sit Transfer) bed railing, RUE support  -[facility] Name 12/16/22 1348          Functional Mobility    Functional Mobility- Ind. Level moderate assist (50% patient effort)  -     Functional Mobility- Device other (see comments)  L UE bed rail, RUE support  -     Functional Mobility-Distance (Feet) 2  -     Functional Mobility- Safety Issues step length decreased;weight-shifting ability decreased  -     Functional Mobility- Comment side step to HOB  -     Row Name 12/16/22 1340          Activities of Daily Living    BADL Assessment/Intervention grooming;feeding  -[facility] Name 12/16/22 1346          Grooming Assessment/Training    [place] Level (Grooming) hair care, combing/brushing;maximum assist (25% patient effort);wash face, hands;minimum assist (75% patient effort);verbal cues  -     Position (Grooming) supported sitting  -[facility] Name 12/16/22 8024          Self-Feeding Assessment/Training    [place] Level (Feeding) prepare tray/open items;dependent (less than 25% patient effort);scoop food and bring to mouth;liquids to mouth;minimum assist (75% patient effort)  -     Position (Self-Feeding) sitting up in bed  -     Comment, (Feeding) pt. having to use non-dominant L hand, tending to eat only food to L side of tray, some loss out R side of mouth, extra time and effort needed, issued adapted cup and built up spoon for trial for increased independence  -           User Key  (r) = Recorded By, (t) = Taken By, (c) = Cosigned By    Initials Name Provider Type    Alyssa Carreno, OT Occupational Therapist               Obj/Interventions     Row Name 12/16/22 7353          Sensory Assessment (Somatosensory)    Sensory Assessment Pt. appear with diminished sensation to RUE compared to LUE, but able to ID all touch.  Initially  perseverating on word elbow, but then able to progress and ID.  -[facility] Name 12/16/22 [facility]          Vision Assessment/Intervention    Vision Assessment Comment pt. reports wearing reading glasses, pt. able to track all directions,  name number of fingers L and R and read time on clock, ID food on tray inaccurately  -[facility] Name 12/16/22 [facility]          Range of Motion Comprehensive    General Range of Motion upper extremity range of motion deficits identified  -     Comment, General Range of Motion Pt. demonstrating grossly 75% AROM elbow flexion L, AAROM shoulder flexion 80 degrees and wrist and hand grossly 20-25% AROM.  Pt. appears with prior L hand deficit and B shoulder limited PROM.  -[facility] Name 12/16/22 [facility]          Strength Comprehensive (MMT)    General Manual Muscle Testing (MMT) Assessment upper extremity strength deficits identified  -     Comment, General Manual Muscle Testing (MMT) Assessment RUE grossly 3- to 3+/5, LUE grossly 4/5  -[facility] Name 12/16/22 [facility]          Shoulder (Therapeutic Exercise)    Shoulder (Therapeutic Exercise) AAROM (active assistive range of motion)  -     Shoulder AAROM (Therapeutic Exercise) right;flexion;extension;aBduction;aDduction;supine;10 repetitions  -[facility] Name 12/16/22 [facility]          Elbow/Forearm (Therapeutic Exercise)    Elbow/Forearm (Therapeutic Exercise) AAROM (active assistive range of motion)  -     Elbow/Forearm AAROM (Therapeutic Exercise) right;flexion;extension;10 repetitions  -[facility] Name 12/16/22 [facility]          Wrist (Therapeutic Exercise)    Wrist (Therapeutic Exercise) AAROM (active assistive range of motion)  -     Wrist AAROM (Therapeutic Exercise) right;flexion;extension  -[facility] Name 12/16/22 [facility]          Hand (Therapeutic Exercise)    Hand (Therapeutic Exercise) AROM (active range of motion)  -     Hand AROM/AAROM (Therapeutic Exercise) AROM (active range of motion);5 repetitions;finger flexion;finger  extension  -     Row Name 12/16/22 1353          Motor Skills    Motor Skills coordination  -CAR     Coordination fine motor deficit;gross motor deficit;bilateral;upper extremity  LUE mild deficit GM and mod FM, RUE max deficit  -CAR     Therapeutic Exercise shoulder;elbow/forearm;wrist;hand  -CAR     Row Name 12/16/22 1355          Balance    Balance Assessment standing static balance;standing dynamic balance  -CAR     Static Sitting Balance minimal assist  SBA with periods of min A  -CAR     Dynamic Sitting Balance minimal assist  -CAR     Position, Sitting Balance sitting edge of bed;unsupported  LUE on bed or bed railing  -CAR     Static Standing Balance moderate assist  UE support  -CAR     Dynamic Standing Balance moderate assist  -CAR     Position/Device Used, Standing Balance other (see comments)  UE support  -CAR     Balance Interventions sit to stand  -CAR           User Key  (r) = Recorded By, (t) = Taken By, (c) = Cosigned By    Initials Name Provider Type    Alyssa Carreno, OT Occupational Therapist               Goals/Plan     Row Name 12/16/22 1407          Bed Mobility Goal 1 (OT)    Activity/Assistive Device (Bed Mobility Goal 1, OT) supine to sit  -CAR     [place] Level/Cues Needed (Bed Mobility Goal 1, OT) minimum assist (75% or more patient effort);verbal cues required;tactile cues required  -CAR     Time Frame (Bed Mobility Goal 1, OT) long term goal (LTG);10 days  -CAR     Progress/Outcomes (Bed Mobility Goal 1, OT) new goal  -     Row Name 12/16/22 1403          Transfer Goal 1 (OT)    Activity/Assistive Device (Transfer Goal 1, OT) sit-to-stand/stand-to-sit;toilet;commode, bedside without drop arms  wx use prn  -CAR     [place] Level/Cues Needed (Transfer Goal 1, OT) minimum assist (75% or more patient effort)  -CAR     Time Frame (Transfer Goal 1, OT) long term goal (LTG);10 days  -CAR     Progress/Outcome (Transfer Goal 1, OT) new goal  -     Row Name 12/16/22 1407          Toileting  Goal 1 (OT)    Activity/Device (Toileting Goal 1, OT) perform perineal hygiene;commode, bedside without drop arms  -CAR     [place] Level/Cues Needed (Toileting Goal 1, OT) moderate assist (50-74% patient effort);verbal cues required  -CAR     Time Frame (Toileting Goal 1, OT) long term goal (LTG);10 days  -CAR     Progress/Outcome (Toileting Goal 1, OT) new goal  -CAR     Row Name 12/16/22 1407          Grooming Goal 1 (OT)    Activity/Device (Grooming Goal 1, OT) hair care;oral care  -CAR     [place] (Grooming Goal 1, OT) moderate assist (50-74% patient effort);verbal cues required  -CAR     Time Frame (Grooming Goal 1, OT) long term goal (LTG);10 days  -CAR     Progress/Outcome (Grooming Goal 1, OT) new goal  -CAR     Row Name 12/16/22 1402          Problem Specific Goal 1 (OT)    Problem Specific Goal 1 (OT) Pt. will sit EOB with SBA for 5 minutes completing functional reaching task or grooming task.  -CAR     Time Frame (Problem Specific Goal 1, OT) long term goal (LTG);10 days  -CAR     Progress/Outcome (Problem Specific Goal 1, OT) new goal  -CAR     Row Name 12/16/22 1404          Therapy Assessment/Plan (OT)    Planned Therapy Interventions (OT) activity tolerance training;BADL retraining;cognitive/visual perception retraining;functional balance retraining;occupation/activity based interventions;ROM/therapeutic exercise;strengthening exercise;neuromuscular control/coordination retraining;patient/caregiver education/training;transfer/mobility retraining  -CAR           User Key  (r) = Recorded By, (t) = Taken By, (c) = Cosigned By    Initials Name Provider Type    Alyssa Carreno, OT Occupational Therapist               Clinical Impression     Row Name 12/16/22 1400          Pain Assessment    Pretreatment Pain Rating 0/10 - no pain  -CAR     Posttreatment Pain Rating 0/10 - no pain  -CAR     Row Name 12/16/22 1400          Plan of Care Review    Plan of Care Reviewed With patient  -CAR     Progress no change   -CAR     Outcome Evaluation OT evaluation completed with patient demonstrating impaired strength, balance, endurance, ROM, coordinatin, sensation and cognition impacting BADL independence and related transfers.  Pt. appropriate for continued skilled OT services to address deficit areas.  Pt. grossly min A sitting balance, mod standing balance, min A self feeding, mod to max A grooming and dependence LBD.  Recommend SNF at discharge.  -     Row Name 12/16/22 1400          Therapy Assessment/Plan (OT)    Patient/Family Therapy Goal Statement (OT) pt. unable to state  -     Rehab Potential (OT) good, to achieve stated therapy goals  -     Criteria for Skilled Therapeutic Interventions Met (OT) yes;meets criteria;skilled treatment is necessary  -     Therapy Frequency (OT) daily  -     Row Name 12/16/22 1400          Therapy Plan Review/Discharge Plan (OT)    Anticipated Discharge Disposition (OT) skilled nursing facility  -     Row Name 12/16/22 1400          Vital Signs    Pre Systolic BP Rehab 161  -CAR     Pre Treatment Diastolic BP 89  -CAR     Post Systolic BP Rehab 184  -CAR     Post Treatment Diastolic   -CAR     Pretreatment Heart Rate (beats/min) 81  -CAR     Posttreatment Heart Rate (beats/min) 94  -CAR     Pre SpO2 (%) 96  -CAR     O2 Delivery Pre Treatment room air  -CAR     O2 Delivery Intra Treatment room air  -CAR     Post SpO2 (%) 93  -CAR     O2 Delivery Post Treatment room air  -CAR     Pre Patient Position Supine  -CAR     Intra Patient Position Sitting  -CAR     Post Patient Position Supine  -     Row Name 12/16/22 1400          Positioning and Restraints    Pre-Treatment Position in bed  -CAR     Post Treatment Position bed  -CAR     In Bed notified nsg;sitting;call light within reach;encouraged to call for assist;exit alarm on;SCD pump applied  somewhat of a turn to left with pillow, sitting with BF tray in front.  -           User Key  (r) = Recorded By, (t) = Taken By, (c) = Cosigned By     Initials Name Provider Type    Alyssa Carreno OT Occupational Therapist               Outcome Measures     Row Name 12/16/22 1330          How much help from another person do you currently need...    Turning from your back to your side while in flat bed without using bedrails? 2 (P)   -ZL     Moving from lying on back to sitting on the side of a flat bed without bedrails? 2 (P)   -ZL     Moving to and from a bed to a chair (including a wheelchair)? 1 (P)   -ZL     Standing up from a chair using your arms (e.g., wheelchair, bedside chair)? 2 (P)   -ZL     Climbing 3-5 steps with a railing? 1 (P)   -ZL     To walk in hospital room? 2 (P)   -ZL     AM-PAC 6 Clicks Score (PT) 10 (P)   -ZL     Highest level of mobility 4 --> Transferred to chair/commode (P)   -ZL     Row Name 12/16/22 2860          Functional Assessment    Outcome Measure Options AM-PAC 6 Clicks Basic Mobility (PT) (P)   -ZL           User Key  (r) = Recorded By, (t) = Taken By, (c) = Cosigned By    Initials Name Provider Type    ZChelsey Schmidt, PT Student PT Student                Occupational Therapy Education     Title: PT OT SLP Therapies (In Progress)     Topic: Occupational Therapy (In Progress)     Point: ADL training (In Progress)     Description:   Instruct learner(s) on proper safety adaptation and remediation techniques during self care or transfers.   Instruct in proper use of assistive devices.              Learning Progress Summary           Patient Acceptance, E, NR by CAR at 12/16/2022 1410    Comment: re-orientation, midline sit, self-feeding and grooming initiation and sequencing/problem solving, bed mobility                   Point: Home exercise program (In Progress)     Description:   Instruct learner(s) on appropriate technique for monitoring, assisting and/or progressing therapeutic exercises/activities.              Learning Progress Summary           Patient Acceptance, E, NR by CAR at 12/16/2022 1410    Comment:  re-orientation, midline sit, self-feeding and grooming initiation and sequencing/problem solving, bed mobility                   Point: Precautions (Not Started)     Description:   Instruct learner(s) on prescribed precautions during self-care and functional transfers.              Learner Progress:  Not documented in this visit.          Point: Body mechanics (In Progress)     Description:   Instruct learner(s) on proper positioning and spine alignment during self-care, functional mobility activities and/or exercises.              Learning Progress Summary           Patient Acceptance, E, NR by CAR at 12/16/2022 1410    Comment: re-orientation, midline sit, self-feeding and grooming initiation and sequencing/problem solving, bed mobility                               User Key     Initials Effective Dates Name Provider Type Discipline    CAR 06/16/21 -  Alyssa Orta, OT Occupational Therapist OT              OT Recommendation and Plan  Planned Therapy Interventions (OT): activity tolerance training, BADL retraining, cognitive/visual perception retraining, functional balance retraining, occupation/activity based interventions, ROM/therapeutic exercise, strengthening exercise, neuromuscular control/coordination retraining, patient/caregiver education/training, transfer/mobility retraining  Therapy Frequency (OT): daily  Plan of Care Review  Plan of Care Reviewed With: patient  Progress: no change  Outcome Evaluation: OT evaluation completed with patient demonstrating impaired strength, balance, endurance, ROM, coordinatin, sensation and cognition impacting BADL independence and related transfers.  Pt. appropriate for continued skilled OT services to address deficit areas.  Pt. grossly min A sitting balance, mod standing balance, min A self feeding, mod to max A grooming and dependence LBD.  Recommend SNF at discharge.     Time Calculation:    Time Calculation- OT     Row Name 12/16/22 1411             Time  Calculation- OT    OT Start Time 1314  -CAR      OT Received On 12/16/22  -CAR      OT Goal Re-Cert Due Date 12/26/22  -CAR         Timed Charges    66889 - OT Therapeutic Exercise Minutes 6  -CAR      02190 - OT Therapeutic Activity Minutes 5  -CAR      03253 - OT Self Care/Mgmt Minutes 16  -CAR         Untimed Charges    OT Eval/Re-eval Minutes 45  -CAR         Total Minutes    Timed Charges Total Minutes 27  -CAR      Untimed Charges Total Minutes 45  -CAR       Total Minutes 72  -CAR            User Key  (r) = Recorded By, (t) = Taken By, (c) = Cosigned By    Initials Name Provider Type    Alyssa Carreno OT Occupational Therapist              Therapy Charges for Today     Code Description Service Date Service Provider Modifiers Qty    99057756445 HC OT SELF CARE/MGMT/TRAIN EA 15 MIN 12/16/2022 Alyssa Orta OT GO 1    79513065514 HC OT THER PROC EA 15 MIN 12/16/2022 Alyssa Orta OT GO 1    84521163759 HC OT EVAL MOD COMPLEXITY 3 12/16/2022 Alyssa Orta OT GO 1               Alyssa Orta OT  12/16/2022

## 2022-12-16 NOTE — THERAPY EVALUATION
Patient Name: Lu Hou  : 1924    MRN: [id number]                              Today's Date: 2022       Admit Date: 12/15/2022    Visit Dx:     ICD-10-CM ICD-9-CM   1. Acute right-sided weakness  R53.1 728.87   2. Hypertensive urgency  I16.0 401.9   3. Dysarthria  R47.1 784.51   4. Dysphagia, unspecified type  R13.10 787.20     Patient Active Problem List   Diagnosis   • Right sided weakness   • Essential hypertension     Past Medical History:   Diagnosis Date   • Hypertension      History reviewed. No pertinent surgical history.   General Information     Row Name 22 1423 22 1036       Physical Therapy Time and Intention    Document Type evaluation  -SS -- (P)   -ZL    Mode of Treatment physical therapy  -SS -- (P)   -ZL    Row Name 22 1423 22 1036       General Information    Patient Profile Reviewed yes  -SS --    Prior Level of Function independent:;all household mobility;gait;transfer;w/c or scooter;bed mobility  use of FWW at baseline  -SS -- (P)   -ZL    Existing Precautions/Restrictions fall;other (see comments)  R sided weaknessk, inattention, incontinent  -SS -- (P)   -ZL    Barriers to Rehab medically complex;previous functional deficit;visual deficit;impaired sensation  -SS -- (P)   R side inattention  -ZL    Row Name 22 1423 22 1036       Living Environment    People in Home child(tia), adult  w/son who is available   -SS -- (P)   adult son  -ZL    Row Name 22 1423 22 1036       Home Main Entrance    Number of Stairs, Main Entrance none  -SS -- (P)   -ZL    Row Name 22 1423 22 1036       Stairs Within Home, Primary    Number of Stairs, Within Home, Primary none  -SS -- (P)   -ZL    Row Name 22 1423 22 1036       Cognition    Orientation Status (Cognition) oriented x 4  -SS -- (P)   increased time required for   -ZL    Row Name 22 1423 22 1036       Safety Issues, Functional Mobility    Safety  Issues Affecting Function (Mobility) awareness of need for assistance;insight into deficits/self-awareness;judgment;problem-solving;safety precaution awareness;safety precautions follow-through/compliance;sequencing abilities  -SS -- (P)   -ZL    Impairments Affecting Function (Mobility) balance;cognition;coordination;endurance/activity tolerance;grasp;motor control;postural/trunk control;range of motion (ROM);sensation/sensory awareness;strength;motor planning  -SS -- (P)   -ZL    Cognitive Impairments, Mobility Safety/Performance -- -- (P)   -ZL    Comment, Safety Issues/Impairments (Mobility) -- -- (P)   -ZL          User Key  (r) = Recorded By, (t) = Taken By, (c) = Cosigned By    Initials Name Provider Type     Araceli Ceballos, PT Physical Therapist    DANETTEL Chelsey Gonzáles, PT Student PT Student               Mobility     Row Name 12/16/22 1426 12/16/22 1042       Bed Mobility    Bed Mobility -- -- (P)   -ZL    Rolling Left [place] (Bed Mobility) moderate assist (50% patient effort);2 person assist;verbal cues  -SS -- (P)   -ZL    Rolling Right [place] (Bed Mobility) moderate assist (50% patient effort);2 person assist;verbal cues  -SS -- (P)   -ZL    Scooting/Bridging [place] (Bed Mobility) 2 person assist;moderate assist (50% patient effort);verbal cues  -SS -- (P)   -ZL    Supine-Sit [place] (Bed Mobility) moderate assist (50% patient effort);verbal cues;2 person assist  -SS -- (P)   -ZL    Sit-Supine [place] (Bed Mobility) 2 person assist;verbal cues;moderate assist (50% patient effort)  -SS --    Assistive Device (Bed Mobility) bed rails;draw sheet;head of bed elevated  -SS -- (P)   -ZL    Comment, (Bed Mobility) VC for sequencing, hand placement; pt. asymptomatic - limited use/advancement of RUE/RLE  -SS -- (P)   -ZL    Row Name 12/16/22 1042          Transfers    Comment, (Transfers) -- (P)   -ZL     Row Name 12/16/22 1426 12/16/22 1042       Bed-Chair Transfer    Bed-Chair  [place] (Transfers) maximum assist (25% patient effort);2 person assist;verbal cues;nonverbal cues (demo/gesture);dependent (less than 25% patient effort)  -SS -- (P)   -ZL    Assistive Device (Bed-Chair Transfers) other (see comments);lift device  BUE support  -SS -- (P)   -ZL    Comment, (Bed-Chair Transfer) V/TC for LE set up, hand placement, sequencing; pt. needed physical assist to advance RLE for recliner to transport chair; utilized lift device for bed to chair transfer  -SS -- (P)   -ZL    Row Name 12/16/22 1426 12/16/22 1042       Sit-Stand Transfer    Sit-Stand [place] (Transfers) moderate assist (50% patient effort);verbal cues;2 person assist  -SS -- (P)   -ZL    Assistive Device (Sit-Stand Transfers) other (see comments)  BUE support  -SS -- (P)   -ZL    Comment, (Sit-Stand Transfer) VC for hand placement, LE set up; RLE blocked but no buckling noted  -SS -- (P)   -ZL    Row Name 12/16/22 1426          Gait/Stairs (Locomotion)    [place] Level (Gait) unable to assess  -     Comment, (Gait/Stairs) not appropriate secondary to difficulty sequencing steps to chair  -SS           User Key  (r) = Recorded By, (t) = Taken By, (c) = Cosigned By    Initials Name Provider Type    SS Araceli Ceballos, PT Physical Therapist    ZChelsey Schmidt PT Student PT Student               Obj/Interventions     Row Name 12/16/22 1509 12/16/22 1049       Range of Motion Comprehensive    General Range of Motion -- -- (P)   -ZL    Comment, General Range of Motion RLE PROM WFL; RLE AROM limited secondary to R sided weakness  -SS -- (P)   -ZL    Row Name 12/16/22 1509 12/16/22 1049       Strength Comprehensive (MMT)    General Manual Muscle Testing (MMT) Assessment -- -- (P)   -ZL    Comment, General Manual Muscle Testing (MMT) Assessment LLE gross 3+/5 and RLE gross 3/5 per functional mobility  -SS -- (P)   -ZL    Row Name 12/16/22 1509 12/16/22 1049       Motor Skills    Motor Skills coordination  -SS -- (P)    -ZL    Coordination gross motor deficit;right;lower extremity;minimal impairment;heel to shin  -SS -- (P)   -ZL    Row Name 12/16/22 1509 12/16/22 1049       Balance    Balance Assessment sitting static balance;sitting dynamic balance;sit to stand dynamic balance;standing static balance;standing dynamic balance  -SS -- (P)   -ZL    Static Sitting Balance contact guard  -SS -- (P)   -ZL    Dynamic Sitting Balance minimal assist  -SS -- (P)   -ZL    Position, Sitting Balance unsupported;sitting edge of bed  -SS -- (P)   -ZL    Sit to Stand Dynamic Balance moderate assist;2-person assist  -SS -- (P)   -ZL    Static Standing Balance 2-person assist;moderate assist  -SS --    Dynamic Standing Balance 2-person assist;maximum assist  -SS --    Position/Device Used, Standing Balance supported;walker, front-wheeled;other (see comments)  BUE support  -SS --    Balance Interventions sitting;standing;sit to stand;supported;dynamic;static  -SS -- (P)   -ZL    Comment, Balance -- -- (P)   -ZL    Row Name 12/16/22 1509 12/16/22 1049       Sensory Assessment (Somatosensory)    Sensory Assessment (Somatosensory) sensation intact;other (see comments)  symmetrical; c/o residual tingling B  -SS -- (P)   -ZL    Bilateral LE Sensory Assessment -- -- (P)   -ZL    Row Name 12/16/22 1049          General Lower Extremity Assessment (Range of Motion)    Lower Extremity: Range of Motion -- (P)   -ZL     Row Name 12/16/22 1049          Lower Extremity (Manual Muscle Testing)    Lower Extremity: Manual Muscle Testing (MMT) -- (P)   -ZL     Comment, MMT: Lower Extremity -- (P)   -ZL     Row Name 12/16/22 1049          Posture    Posture -- (P)   intermittent L posterior lean  -ZL     Row Name 12/16/22 1049          Postural Deviations    Postural Deviations -- (P)   -ZL           User Key  (r) = Recorded By, (t) = Taken By, (c) = Cosigned By    Initials Name Provider Type    SS Araceli Ceballos, PT Physical Therapist    ZL Chelsey Gonzáles, PT Student  PT Student               Goals/Plan     Row Name 12/16/22 1524 12/16/22 1106       Bed Mobility Goal 1 (PT)    Activity/Assistive Device (Bed Mobility Goal 1, PT) bed mobility activities, all  -SS -- (P)   -ZL    [place] Level/Cues Needed (Bed Mobility Goal 1, PT) minimum assist (75% or more patient effort)  -SS -- (P)   -ZL    Time Frame (Bed Mobility Goal 1, PT) long term goal (LTG);10 days  -SS -- (P)   -ZL    Strategies/Barriers (Bed Mobility Goal 1, PT) -- -- (P)   -ZL    Progress/Outcomes (Bed Mobility Goal 1, PT) -- -- (P)   -ZL    Row Name 12/16/22 1524 12/16/22 1106       Transfer Goal 1 (PT)    Activity/Assistive Device (Transfer Goal 1, PT) sit-to-stand/stand-to-sit;bed-to-chair/chair-to-bed  -SS -- (P)   -ZL    [place] Level/Cues Needed (Transfer Goal 1, PT) minimum assist (75% or more patient effort)  -SS -- (P)   -ZL    Time Frame (Transfer Goal 1, PT) long term goal (LTG);10 days  -SS -- (P)   -ZL    Strategies/Barriers (Transfers Goal 1, PT) -- -- (P)   -ZL    Progress/Outcome (Transfer Goal 1, PT) -- -- (P)   -ZL    Row Name 12/16/22 1524 12/16/22 1106       Gait Training Goal 1 (PT)    Activity/Assistive Device (Gait Training Goal 1, PT) gait (walking locomotion);assistive device use;walker, platform;walker, rolling  AD pending progress  -SS -- (P)   pending progress  -ZL    [place] Level (Gait Training Goal 1, PT) minimum assist (75% or more patient effort)  -SS -- (P)   -ZL    Distance (Gait Training Goal 1, PT) 100  -SS -- (P)   -ZL    Time Frame (Gait Training Goal 1, PT) long term goal (LTG);10 days  -SS -- (P)   -ZL    Strategies/Barriers (Gait Training Goal 1, PT) -- -- (P)   -ZL    Progress/Outcome (Gait Training Goal 1, PT) -- -- (P)   -JORGE A    Row Name 12/16/22 1524 12/16/22 1106       Therapy Assessment/Plan (PT)    Planned Therapy Interventions (PT) balance training;bed mobility training;gait training;home exercise program;motor coordination training;neuromuscular  re-education;patient/family education;postural re-education;ROM (range of motion);strengthening;stretching;transfer training  -SS -- (P)   -ZL          User Key  (r) = Recorded By, (t) = Taken By, (c) = Cosigned By    Initials Name Provider Type    SS Araceli Ceballos, PT Physical Therapist    Chelsey Wiseman, PT Student PT Student               Clinical Impression     Row Name 12/16/22 1517 12/16/22 1057       Pain    Pretreatment Pain Rating 0/10 - no pain  -SS -- (P)   -ZL    Posttreatment Pain Rating 0/10 - no pain  -SS -- (P)   -ZL    Pain Intervention(s) Repositioned;Ambulation/increased activity;Elevated  -SS -- (P)   -ZL    Additional Documentation Pain Scale: Numbers Pre/Post-Treatment (Group)  -SS -- (P)   -ZL    Row Name 12/16/22 1517 12/16/22 1057       Plan of Care Review    Plan of Care Reviewed With patient;son  -SS -- (P)   -ZL    Progress -- -- (P)   -ZL    Outcome Evaluation Pt. presents with generalized weakness, balance impairments, R inattention and R sided weakness affecting her ability to safely participate in functional mobility. Pt. performed bed mobility with mod assist of 2. She performed sit to stand transfer w/mod assist of 2 and bed to chair transfer w/max assist of 2. Deferred ambulation secondary to patient being transported for test. Recommend IPR upon discharge.  -SS -- (P)   -ZL    Row Name 12/16/22 1517 12/16/22 1057       Therapy Assessment/Plan (PT)    Rehab Potential (PT) good, to achieve stated therapy goals  -SS -- (P)   -ZL    Criteria for Skilled Interventions Met (PT) yes;meets criteria;skilled treatment is necessary  -SS -- (P)   -ZL    Therapy Frequency (PT) daily  -SS -- (P)   -ZL    Row Name 12/16/22 1517 12/16/22 1057       Vital Signs    Pre Systolic BP Rehab 147  -SS --    Pre Treatment Diastolic BP 96  -SS --    Post Systolic BP Rehab 164  -SS --    Post Treatment Diastolic BP 80  -SS --    Pretreatment Heart Rate (beats/min) 106  -SS --    Pre Patient Position  Supine  -SS -- (P)   -ZL    Post Patient Position Sitting  -SS -- (P)   -ZL    Row Name 12/16/22 1517 12/16/22 1057       Positioning and Restraints    Pre-Treatment Position in bed  -SS -- (P)   -ZL    Post Treatment Position other  w/transport team  -SS -- (P)   -ZL    In Chair -- -- (P)   -ZL    Other Position with other staff  transport team  -SS --          User Key  (r) = Recorded By, (t) = Taken By, (c) = Cosigned By    Initials Name Provider Type    SS Araceli Ceballos, PT Physical Therapist    ZL Chelsey Gonzáles, PT Student PT Student               Outcome Measures     Row Name 12/16/22 1527 12/16/22 1330       How much help from another person do you currently need...    Turning from your back to your side while in flat bed without using bedrails? 2  -SS -- (P)   -ZL    Moving from lying on back to sitting on the side of a flat bed without bedrails? 2  -SS -- (P)   -ZL    Moving to and from a bed to a chair (including a wheelchair)? 2  -SS -- (P)   -ZL    Standing up from a chair using your arms (e.g., wheelchair, bedside chair)? 2  -SS -- (P)   -ZL    Climbing 3-5 steps with a railing? 1  -SS -- (P)   -ZL    To walk in hospital room? 2  -SS -- (P)   -ZL    AM-PAC 6 Clicks Score (PT) 11  -SS -- (P)   -ZL    Highest level of mobility 4 --> Transferred to chair/commode  -SS -- (P)   -ZL    Row Name 12/16/22 1527          Modified [place] Scale    Pre-Stroke Modified [place] Scale 6 - Unable to determine (UTD) from the medical record documentation  -     Modified [place] Scale 4 - Moderately severe disability.  Unable to walk without assistance, and unable to attend to own bodily needs without assistance.  -     Row Name 12/16/22 1527 12/16/22 1330       Functional Assessment    Outcome Measure Options AM-PAC 6 Clicks Basic Mobility (PT);Modified [place]  -SS -- (P)   -ZL          User Key  (r) = Recorded By, (t) = Taken By, (c) = Cosigned By    Initials Name Provider Type    Araceli Lizama, PT Physical  Therapist    Chelsey Wiseman, PT Student PT Student                             Physical Therapy Education     Title: PT OT SLP Therapies (In Progress)     Topic: Physical Therapy (In Progress)     Point: Mobility training (Done)     Learning Progress Summary           Patient Eager, E, VU,DU,NR by  at 12/16/2022 1528    Comment: Educated pt. safety/technique with bed mobility, transfers, PT POC   Family Eager, E, VU,DU,NR by  at 12/16/2022 1528    Comment: Educated pt. safety/technique with bed mobility, transfers, PT POC                   Point: Home exercise program (Not Started)     Learner Progress:  Not documented in this visit.          Point: Body mechanics (Done)     Learning Progress Summary           Patient Eager, E, VU,DU,NR by  at 12/16/2022 1528    Comment: Educated pt. safety/technique with bed mobility, transfers, PT POC   Family Eager, E, VU,DU,NR by  at 12/16/2022 1528    Comment: Educated pt. safety/technique with bed mobility, transfers, PT POC                   Point: Precautions (Done)     Learning Progress Summary           Patient Eager, E, VU,DU,NR by  at 12/16/2022 1528    Comment: Educated pt. safety/technique with bed mobility, transfers, PT POC   Family Eager, E, VU,DU,NR by  at 12/16/2022 1528    Comment: Educated pt. safety/technique with bed mobility, transfers, PT POC                               User Key     Initials Effective Dates Name Provider Type Discipline     06/01/21 -  Araceli Ceballos, [facility] Physical Therapist PT              PT Recommendation and Plan  Planned Therapy Interventions (PT): balance training, bed mobility training, gait training, home exercise program, motor coordination training, neuromuscular re-education, patient/family education, postural re-education, ROM (range of motion), strengthening, stretching, transfer training  Plan of Care Reviewed With: patient, son  Outcome Evaluation: Pt. presents with generalized weakness, balance impairments, R  inattention and R sided weakness affecting her ability to safely participate in functional mobility. Pt. performed bed mobility with mod assist of 2. She performed sit to stand transfer w/mod assist of 2 and bed to chair transfer w/max assist of 2. Deferred ambulation secondary to patient being transported for test. Recommend IPR upon discharge.     Time Calculation:    PT Charges     Row Name 12/16/22 1529             Time Calculation    Start Time 0951  -SS      Stop Time 1030  -SS      Time Calculation (min) 39 min  -SS      PT Received On 12/16/22  -SS      PT Goal Re-Cert Due Date 12/26/22  -SS         Time Calculation- PT    Total Timed Code Minutes- PT 39 minute(s)  -SS         Timed Charges    47661 - PT Therapeutic Activity Minutes 15  -SS         Untimed Charges    PT Eval/Re-eval Minutes 50  -SS         Total Minutes    Timed Charges Total Minutes 15  -SS      Untimed Charges Total Minutes 50  -SS       Total Minutes 65  -SS            User Key  (r) = Recorded By, (t) = Taken By, (c) = Cosigned By    Initials Name Provider Type     Araceli Ceballos, PT Physical Therapist              Therapy Charges for Today     Code Description Service Date Service Provider Modifiers Qty    78637857485 HC PT THERAPEUTIC ACT EA 15 MIN 12/16/2022 Araceli Ceballos, PT GP 1    33643833006 HC PT EVAL MOD COMPLEXITY 4 12/16/2022 Araceli Ceballos, PT GP 1          PT G-Codes  Outcome Measure Options: AM-PAC 6 Clicks Basic Mobility (PT), Modified [place]  AM-PAC 6 Clicks Score (PT): 11  Modified [place] Scale: 4 - Moderately severe disability.  Unable to walk without assistance, and unable to attend to own bodily needs without assistance.  PT Discharge Summary  Anticipated Discharge Disposition (PT): inpatient rehabilitation facility    Araceli Ceballos PT  12/16/2022

## 2022-12-16 NOTE — MBS/VFSS/FEES
Acute Care - Speech Language Pathology   Swallow Initial Evaluation  [facility]   Modified Barium Swallow Study (MBS)     Patient Name: Lu Hou  : 1924  MRN: [id number]  Today's Date: 2022               Admit Date: 12/15/2022    Visit Dx:     ICD-10-CM ICD-9-CM   1. Acute right-sided weakness  R53.1 728.87   2. Hypertensive urgency  I16.0 401.9   3. Dysarthria  R47.1 784.51   4. Dysphagia, unspecified type  R13.10 787.20     Patient Active Problem List   Diagnosis   • Right sided weakness   • Essential hypertension     Past Medical History:   Diagnosis Date   • Hypertension      History reviewed. No pertinent surgical history.    SLP Recommendation and Plan  SLP Swallowing Diagnosis: mild-moderate, oral dysphagia, pharyngeal dysphagia (22 1030)  SLP Diet Recommendation: soft to chew textures, chopped, no mixed consistencies, thin liquids (22)  Recommended Precautions and Strategies: upright posture during/after eating, small bites of food and sips of liquid, no straw, general aspiration precautions (22)  SLP Rec. for Method of Medication Administration: meds whole, meds crushed, with puree, as tolerated (check that clearing mouth) (22)     Monitor for Signs of Aspiration: notify SLP if any concerns (22)     Swallow Criteria for Skilled Therapeutic Interventions Met: demonstrates skilled criteria (22)  Anticipated Discharge Disposition (SLP): inpatient rehabilitation facility, anticipate therapy at next level of care (22)  Rehab Potential/Prognosis, Swallowing: good, to achieve stated therapy goals (22 103)  Therapy Frequency (Swallow): 5 days per week (22 103)  Predicted Duration Therapy Intervention (Days): until discharge (22)                                        Plan of Care Reviewed With: patient      SWALLOW EVALUATION (last 72 hours)     SLP Adult Swallow Evaluation     Row Name 22  1030 12/15/22 1540                Rehab Evaluation    Document Type evaluation  - evaluation  -       Subjective Information no complaints  - no complaints  -       Patient Observations alert;cooperative  - cooperative  -       Patient/Family/Caregiver Comments/Observations -- No family present  -       Patient Effort -- good  -       Symptoms Noted During/After Treatment -- none  -          General Information    Patient Profile Reviewed -- yes  -       Pertinent History Of Current Problem -- Adm for further evaluation of R sided weakness, facial droop, & dysarthria. No significant medical hx available in chart. MRI: negative for acute intracranial abnormalities  -       Current Method of Nutrition NPO  - NPO  -       Precautions/Limitations, Vision -- WFL;for purposes of eval  -       Precautions/Limitations, Hearing -- WFL;for purposes of eval  [facility]       Prior Level of Function-Communication -- unknown  -       Prior Level of Function-Swallowing -- unknown  -       Plans/Goals Discussed with -- patient;agreed upon  [facility]       Barriers to Rehab -- none identified  -       Patient's Goals for Discharge -- patient did not state  -          Pain    Additional Documentation Pain Scale: Numbers Pre/Post-Treatment (Group)  - Pain Scale: FACES Pre/Post-Treatment ([facility])  [facility]          Pain Scale: Numbers Pre/Post-Treatment    Pretreatment Pain Rating 0/10 - no pain  - --       Posttreatment Pain Rating 0/10 - no pain  - --          Pain Scale: FACES Pre/Post-Treatment    Pain: FACES Scale, Pretreatment -- 0-->no hurt  -       Posttreatment Pain Rating -- 0-->no hurt  -          Oral Motor Structure and Function    Dentition Assessment -- upper dentures/partial in place;lower dentures/partial in place  -       Secretion Management -- WNL/WFL  -       Mucosal Quality -- moist, healthy  -          Oral Musculature and Cranial Nerve Assessment    Oral Motor General Assessment  -- oral labial or buccal impairment  -       Oral Labial or Buccal Impairment, Detail, Cranial Nerve VII (Facial): -- right labial droop;other (see comments)  Dense right labial droop  -          General Eating/Swallowing Observations    Respiratory Support Currently in Use -- room air  -       Eating/Swallowing Skills -- self-fed;fed by SLP  -       Positioning During Eating -- upright in bed  -       Utensils Used -- spoon;cup;straw  -       Consistencies Trialed -- thin liquids;nectar/syrup-thick liquids;pureed  -          Clinical Swallow Eval    Pharyngeal Phase -- suspected pharyngeal impairment  -       Clinical Swallow Evaluation Summary -- Pt w/ overt s/s of aspiration c/b consistent throat clear w/ thin liquid and nectar thick liquid trials; delayed cough w/ nectar. Dense right facial droop w/ minimal anterior loss w/ cup presentations. No overt s/s w/ pureed & adequate oral manipulation/clearance & no significant residue. Discussed aspiration risk w/ pt given overt s/s, pt able to demonstrate understanding and states she would like to speak to her son prior to making any decisions. Unable to safely recommend PO diet at this time. However, NPO may not be most appropriate given advanced age. Safest recommendation is NPO/amanda MD. Essential meds ok in applesauce. SLP will f/u for [facility] 12/16 to further assess swallow fx  -          Pharyngeal Phase Concerns    Pharyngeal Phase Concerns -- cough;throat clear  -       Cough -- nectar  -       Throat Clear -- thin;nectar  -          MBS/VFSS    Utensils Used spoon;cup;straw  -SM --       Consistencies Trialed thin liquids;pureed;regular textures  -SM --          MBS/VFSS Interpretation    Oral Prep Phase impaired oral phase of swallowing  -SM --       Oral Transit Phase impaired  -SM --       Oral Residue impaired  -SM --          Oral Preparatory Phase    Oral Preparatory Phase anterior loss;prolonged manipulation  -SM --        Anterior Loss thin liquids;secondary to reduced labial seal  -SM --       Prolonged Manipulation regular textures;secondary to reduced lingual strength  -SM --          Oral Transit Phase    Impaired Oral Transit Phase increased A-P transit time;premature spillage of liquids into pharynx  -SM --       Increased A-P Transit Time all consistencies tested;secondary to reduced lingual control;discoordination of lingual movement;other (see comments)  -SM --       Premature Spillage of Liquids into Pharynx thin liquids;secondary to reduced lingual control;other (see comments)  worse via straw sips  -SM --       Oral Transit Phase, Comment Difficulty manipulating and propelling regular solid bolus. Provided lliquid mix to assist, which lead to aspiration of liquid mix material during the swallow 2' reduced lingual control and delayed swallow initiation.  -SM --          Oral Residue    Impaired Oral Residue diffuse residue throughout oral cavity;other (see comments)  > on R  -SM --       Diffuse Residue throughout Oral Cavity all consistencies tested;secondary to reduced lingual strength  -SM --       Response to Oral Residue cleared residue;with spontaneous subsequent swallow  -SM --          Initiation of Pharyngeal Swallow    Initiation of Pharyngeal Swallow bolus in pyriform sinuses  -SM --       Pharyngeal Phase impaired pharyngeal phase of swallowing  -SM --       Aspiration During the Swallow thin liquids;secondary to delayed swallow initiation or mistiming  -SM --       Response to Aspiration Yes  -SM --       Responded to aspiration with cough;effective  -SM --       Rosenbek's Scale thin:;6--->level 6  -SM --       Attempted Compensatory Maneuvers bolus size;bolus presentation style  -SM --       Response to Attempted Compensatory Maneuvers prevented aspiration  -SM --       Successful Compensatory Maneuver Competency patient able to;teach back compensations;other (see comments)  will f/u for staff and family  education to assist with carryover  - --          SLP Evaluation Clinical Impression    SLP Swallowing Diagnosis mild-moderate;oral dysphagia;pharyngeal dysphagia  - suspected pharyngeal dysphagia  -       Functional Impact risk of aspiration/pneumonia  - risk of aspiration/pneumonia  -       Rehab Potential/Prognosis, Swallowing good, to achieve stated therapy goals  - good, to achieve stated therapy goals  -       Swallow Criteria for Skilled Therapeutic Interventions Met demonstrates skilled criteria  - demonstrates skilled criteria  -          Recommendations    Therapy Frequency (Swallow) 5 days per week  - --       Predicted Duration Therapy Intervention (Days) until discharge  - until discharge  -       SLP Diet Recommendation soft to chew textures;chopped;no mixed consistencies;thin liquids  - NPO;other (see comments)  meds ok in applesauce  -       Recommended Diagnostics -- VFSS ([facility])  12/16  -       Recommended Precautions and Strategies upright posture during/after eating;small bites of food and sips of liquid;no straw;general aspiration precautions  - general aspiration precautions  -       Oral Care Recommendations Oral Care BID/PRN  - Oral Care BID/PRN  -       SLP Rec. for Method of Medication Administration meds whole;meds crushed;with puree;as tolerated  check that clearing mouth  - meds crushed;with puree  essential meds ok in applesauce  -       Monitor for Signs of Aspiration notify SLP if any concerns  - yes;notify SLP if any concerns  -       Anticipated Discharge Disposition (SLP) inpatient rehabilitation facility;anticipate therapy at next level of care  - unknown;anticipate therapy at next level of care  -             User Key  (r) = Recorded By, (t) = Taken By, (c) = Cosigned By    Initials Name Effective Dates     Cordelia Crabtree MS CCC-SLP 06/16/21 -      Nataly Mcmahon MS, CFY-SLP 06/22/22 -                 EDUCATION  The  patient has been educated in the following areas:   Dysphagia (Swallowing Impairment) Modified Diet Instruction.        SLP GOALS     Row Name 12/16/22 1030 12/15/22 6150          (LTG) Patient will demonstrate functional swallow for    Diet Texture (Demonstrate functional swallow) soft to chew (whole) textures  -SM --     Liquid viscosity (Demonstrate functional swallow) thin liquids  -SM --     [place] (Demonstrate functional swallow) with minimal cues (75-90% accuracy)  -SM --     Time Frame (Demonstrate functional swallow) by discharge  -SM --        (STG) Patient will tolerate trials of    Consistencies Trialed (Tolerate trials) soft to chew (chopped) textures;thin liquids  -SM --     Desired Outcome (Tolerate trials) without signs/symptoms of aspiration;with adequate oral prep/transit/clearance  -SM --     [place] (Tolerate trials) independently (over 90% accuracy)  -SM --     Time Frame (Tolerate trials) by discharge  -SM --        (STG) Labial Strengthening Goal 1 (SLP)    Activity (Labial Strengthening Goal 1, SLP) increase labial tone  -SM --     Increase Labial Tone labial resistance exercises  -SM --     [place]/Accuracy (Labial Strengthening Goal 1, SLP) with minimal cues (75-90% accuracy)  -SM --     Time Frame (Labial Strengthening Goal 1, SLP) short term goal (STG)  -SM --        (STG) Lingual Strengthening Goal 1 (SLP)    Activity (Lingual Strengthening Goal 1, SLP) increase lingual tone/sensation/control/coordination/movement;increase tongue back strength  -SM --     Increase Lingual Tone/Sensation/Control/Coordination/Movement swallow trials;lingual resistance exercises  -SM --     Increase Tongue Back Strength lingual resistance exercises  -SM --     [place]/Accuracy (Lingual Strengthening Goal 1, SLP) with minimal cues (75-90% accuracy)  -SM --     Time Frame (Lingual Strengthening Goal 1, SLP) short term goal (STG)  -SM --        (STG) Pharyngeal Strengthening Exercise Goal  1 (SLP)    Activity (Pharyngeal Strengthening Goal 1, SLP) increase timing;increase closure at entrance to airway/closure of airway at glottis  - --     Increase Timing supraglottic swallow;prepping - 3 second prep or suck swallow or 3-step swallow  -SM --     Increase Closure at Entrance to Airway/Closure of Airway at Glottis hard effortful swallow  + tongue press  - --     [place]/Accuracy (Pharyngeal Strengthening Goal 1, SLP) with minimal cues (75-90% accuracy)  -SM --     Time Frame (Pharyngeal Strengthening Goal 1, SLP) short term goal (STG)  - --        (STG) Swallow Compensatory Strategies Goal 1 (SLP)    Activity (Swallow Compensatory Strategies/Techniques Goal 1, SLP) compensatory strategies;small cup sips;other (see comments)  ensure food cleared from mouth before taking sip  - --     [place]/Accuracy (Swallow Compensatory Strategies/Techniques Goal 1, SLP) independently (over 90% accuracy)  - --     Time Frame (Swallow Compensatory Strategies/Techniques Goal 1, SLP) short term goal (STG)  - --        Phonation Goal 1 (SLP)    Improve Phonation By Goal 1 (SLP) -- using loud speech;80%;with minimal cues (75-90%)  -     Time Frame (Phonation Goal 1, SLP) -- short term goal (STG)  -     Progress/Outcomes (Phonation Goal 1, SLP) -- new goal  -        Articulation Goal 1 (SLP)    Improve Articulation Goal 1 (SLP) -- by over-articulating at phrase level;by over-articulating in connected speech;80%;with minimal cues (75-90%)  -     Time Frame (Articulation Goal 1, SLP) -- short term goal (STG)  -     Progress/Outcomes (Articulation Goal 1, SLP) -- new goal  -        Orientation Goal 1 ([facility])    Improve Orientation Through Goal 1 (SLP) -- demonstrating orientation to day;demonstrating orientation to month;demonstrating orientation to year;demonstrating orientation to place;demonstrating orientation to disease/impairment;80%;with minimal cues (75-90%)  -     Time Frame  (Orientation Goal 1, SLP) -- short term goal (STG)  -MH     Progress/Outcomes (Orientation Goal 1, SLP) -- new goal  -        Memory Skills Goal 1 (SLP)    Improve Memory Skills Through Goal 1 (SLP) -- recalling related word lists with an imposed delay;recalling unrelated word lists with an imposed delay;80%;with minimal cues (75-90%)  -     Time Frame (Memory Skills Goal 1, SLP) -- short term goal (STG)  -MH     Progress/Outcomes (Memory Skills Goal 1, SLP) -- new goal  -MH        Patient will demonstrate functional speech skills for return to discharge environment    [place] -- Independently  -MH     Time frame -- by discharge  -MH     Progress/Outcomes -- new goal  -MH        Patient will demonstrate functional cognitive-linguistic skills for return to discharge environment    [place] -- Independently  -MH     Time frame -- by discharge  -MH     Progress/Outcomes -- new goal  -           User Key  (r) = Recorded By, (t) = Taken By, (c) = Cosigned By    Initials Name Provider Type    Cordelia Baxter MS CCC-SLP Speech and Language Pathologist     Nataly Mcmahon, MS, CFY-SLP Speech and Language Pathologist                   Time Calculation:    Time Calculation- SLP     Row Name 12/16/22 1324             Time Calculation- SLP    SLP Start Time 1030  -SM      SLP Received On 12/16/22  -         Untimed Charges    07774-AW Motion Fluoro Eval Swallow Minutes 55  -SM         Total Minutes    Untimed Charges Total Minutes 55  -SM       Total Minutes 55  -SM            User Key  (r) = Recorded By, (t) = Taken By, (c) = Cosigned By    Initials Name Provider Type    Cordelia Baxter MS CCC-SLP Speech and Language Pathologist                Therapy Charges for Today     Code Description Service Date Service Provider Modifiers Qty    [id number] HC ST MOTION FLUORO EVAL SWALLOW 4 12/16/2022 Cordelia Crabtree MS CCC-SLP GN 1            Patient was not wearing a face mask and did  exhibit coughing during this therapy encounter.  Procedure performed was aerosolizing, involved close contact (within 6 feet for at least 15 minutes or longer), and did not involve contact with infectious secretions or specimens.  Therapist used appropriate personal protective equipment including gloves and standard procedure mask.  Appropriate PPE was worn during the entire therapy session.  Hand hygiene was completed before and after therapy session.       Cordelia Crabtree, MS NEFF-SLP  12/16/2022

## 2022-12-16 NOTE — PROGRESS NOTES
[facility] Medicine Services  PROGRESS NOTE    Patient Name: Lu Hou  : 1924  MRN: [id number]    Date of Admission: 12/15/2022  Primary Care Physician: Provider, No Known    Subjective   Subjective     CC:  stroke    HPI:  Pt doing well today.  Son in the room today and assists with the history.  Weakness in R upper and lower ext.  Uses a walker at home.    ROS:  Gen- No fevers, chills  CV- No chest pain, palpitations  Resp- No cough, dyspnea  GI- No N/V/D, abd pain         Objective   Objective     Vital Signs:   Temp:  [97.6 °F (36.4 °C)-98 °F (36.7 °C)] 98 °F (36.7 °C)  Heart Rate:  [] 87  Resp:  [15-17] 16  BP: (157-192)/() 186/88     Physical Exam:  Constitutional: No acute distress, awake, alert  HENT: NCAT, mucous membranes moist  Respiratory: Clear to auscultation bilaterally, respiratory effort normal   Cardiovascular: RRR, no murmurs, rubs, or gallops  Gastrointestinal: Positive bowel sounds, soft, nontender, nondistended  Musculoskeletal: No bilateral ankle edema  Psychiatric: Appropriate affect, cooperative  Neurologic: Oriented x 3, 3+/5 strength in RUE with 4/5 .  5/5 in LUE.  4/5 in RLE and 5/5 in LLE.  Facial droop on L.  speech clear although actively coughing after taking her meds.  Skin: No rashes      Results Reviewed:  LAB RESULTS:      Lab 12/15/22  1000 12/15/22  0959 12/15/22  0956   WBC  --  5.45  --    HEMOGLOBIN  --  15.4  --    HEMOGLOBIN, POC 15.0  --   --    HEMATOCRIT  --  45.4  --    HEMATOCRIT POC 44  --   --    PLATELETS  --  230  --    NEUTROS ABS  --  3.18  --    IMMATURE GRANS (ABS)  --  0.02  --    LYMPHS ABS  --  1.45  --    MONOS ABS  --  0.55  --    EOS ABS  --  0.20  --    MCV  --  93.2  --    PROTIME  --   --  13.2   APTT  --  28.3  --          Lab 22  0533 12/15/22  1000 12/15/22  0959   SODIUM  --   --  142   POTASSIUM  --   --  4.3   CHLORIDE  --   --  105   CO2  --   --  24.0   ANION GAP  --   --  13.0    BUN  --   --  8   CREATININE  --  0.50* 0.55*   EGFR  --  84.9 83.0   GLUCOSE  --   --  117*   CALCIUM  --   --  9.2   HEMOGLOBIN A1C 5.30  --   --          Lab 12/15/22  0959   TOTAL PROTEIN 6.8   ALBUMIN 4.30   GLOBULIN 2.5   ALT (SGPT) 17   AST (SGOT) 28   BILIRUBIN 0.7   ALK PHOS 65         Lab 12/15/22  1043 12/15/22  0956   TROPONIN T 0.013  --    PROTIME  --  13.2   INR  --  1.1         Lab 12/16/22  0533   CHOLESTEROL 136   LDL CHOL 44   HDL CHOL 80*   TRIGLYCERIDES 53             Brief Urine Lab Results  (Last result in the past 365 days)      Color   Clarity   Blood   Leuk Est   Nitrite   Protein   CREAT   Urine HCG        12/15/22 1059 Yellow   Clear   Trace   Negative   Negative   Negative                 Microbiology Results Abnormal     Procedure Component Value - Date/Time    COVID-19 and FLU A/B PCR - Swab, Nasopharynx [[id number]]  (Normal) Collected: 12/15/22 1020    Lab Status: Final result Specimen: Swab from Nasopharynx Updated: 12/15/22 1102     COVID19 Not Detected     Influenza A PCR Not Detected     Influenza B PCR Not Detected    Narrative:      Fact sheet for providers: https://www.fda.gov/media/499707/download    Fact sheet for patients: https://www.fda.gov/media/717663/download    Test performed by PCR.          CT Angiogram Neck    Result Date: 12/15/2022  DATE OF EXAM: 12/15/2022 9:51 AM  PROCEDURE: CT ANGIOGRAM NECK-, CT ANGIOGRAM HEAD W AI ANALYSIS OF LVO-  INDICATIONS: stroke  COMPARISON: No comparisons available.  TECHNIQUE: CTA of the head and CTA of the neck was performed after the intravenous administration of 115 mL of Isovue 370. Reconstructed coronal and sagittal images were also obtained. In addition, a 3-D volume rendered image was obtained after post processing. Automated exposure control and iterative reconstruction methods were used. AI analysis of LVO was utilized for the CTA Head imaging portion of the study.  The radiation dose reduction device was turned on for each  scan per the ALARA (As Low as Reasonably Achievable) protocol.  Stenosis measurement was performed by the NASCET or similar method.  FINDINGS: CTA NECK: Included subclavian arteries are tortuous, but normal in caliber.  On the right, no hemodynamically significant common carotid stenosis is seen. There is mild calcified plaque of the ICA origin, but no evidence of hemodynamically significant right ICA or ECA stenosis.  On the left, common, internal and external carotid arteries are normal in caliber. There is trace external carotid artery origin calcification and no evidence of significant atherosclerotic change elsewhere. Sagittal reconstructions appear to show mild smooth narrowing of the distal common carotid and ICA origin, but no plaque is apparent here on the axial thin section images, and again, no evidence of significant stenosis.  There is probably moderate right vertebral artery origin stenosis, images 77 through 81. Right vertebral artery is nondominant but no other focal narrowing is seen. Left vertebral artery is relatively large, normal in caliber along its length. No significant incidental disease is seen of the included lung apices, superior mediastinum, or neck soft tissues.      Impression: No evidence of hemodynamically significant carotid or left vertebral artery stenosis in the neck. Suspected moderate origin stenosis of the nondominant right vertebral artery.    CTA HEAD: Nondominant, but otherwise normal-appearing right vertebral artery is seen with no focal stenosis. Large left vertebral artery appears normal. Basilar artery appears normal. Posterior cerebral arteries appear normal except for mild irregularity of the distal left posterior cerebral artery, which appears to show mild atherosclerotic disease. There also appears to be a moderate-sized left posterior communicating artery in addition to normal appearing left P1 segment.  There is relatively mild calcification of the cavernous  carotid arteries and no evidence of hemodynamically significant carotid stenosis. Anterior and middle cerebral arteries and proximal branches are normal in caliber. There appears to be significant distal M2 level stenosis on the right, axial MIP image 18 series 901, rapid MIP image nine series 904, and milder distal disease. There is a somewhat larger right middle cerebral artery than left, and what appears to be relatively mild distal left M1 stenosis, best seen on axial MIP image 23 series 901, rapid MIP image 10 series 904. No distal branch stenosis is appreciated.  IMPRESSION:  1. Relatively mild atherosclerotic changes of the distal left posterior cerebral artery, and distal left M1 segment. 2. Potentially high-grade right M2 focal stenosis, difficult to characterize by NASCET criteria. Milder distal right M2/M3 atherosclerotic change.  This report was finalized on 12/15/2022 11:20 AM by Dr. Andrea Pfeiffer MD.      MRI Brain Without Contrast    Result Date: 12/16/2022  DATE OF EXAM: 12/16/2022 2:32 AM  PROCEDURE: MRI BRAIN WO CONTRAST-  INDICATIONS: Stroke, follow up  COMPARISON: No comparisons available.  TECHNIQUE: Multiplanar multisequence images of the brain were performed without contrast according to routine brain MRI protocol.  FINDINGS: Small areas of acute infarct are present within the left putamen. There is no evidence of associated hemorrhage or significant mass effect. Age-related changes are present with generalized volume loss. There is ex vacuo prominence of the lateral ventricles. The orbits are normal and the paranasal sinuses are grossly clear.      Impression: Small area of acute infarct present within the left putamen, without associated hemorrhage or significant mass effect.  Age-related changes are otherwise present as above.  This report was finalized on 12/16/2022 8:48 AM by Tony Mazariegos.      XR Chest 1 View    Result Date: 12/15/2022  DATE OF EXAM: 12/15/2022 10:46 AM  PROCEDURE: XR  CHEST 1 VW-  INDICATIONS: Acute Stroke Protocol (onset < 12 hrs)  COMPARISON: No comparisons available.  TECHNIQUE: Single radiographic AP view of the chest was obtained.  FINDINGS: Mild chronic changes of the lung fields are present without focal airspace opacity. There is no significant pleural effusion or distinct pneumothorax. Normal heart and mediastinal contours. Right humerus fixation noted.      Impression: Mild chronic interstitial and fibrotic changes of the lung fields without evidence of acute cardiopulmonary abnormality.  This report was finalized on 12/15/2022 11:16 AM by Tony Mazariegos.      CT Head Without Contrast Stroke Protocol    Result Date: 12/15/2022  DATE OF EXAM: 12/15/2022 9:47 AM  PROCEDURE: CT HEAD WO CONTRAST STROKE PROTOCOL-  INDICATIONS: STROKE  COMPARISON: No comparisons available.  TECHNIQUE: Routine transaxial and coronal reconstruction images were obtained through the head without the administration of contrast. Automated exposure control and iterative reconstruction methods were used.  The radiation dose reduction device was turned on for each scan per the ALARA (As Low as Reasonably Achievable) protocol.  FINDINGS: Gray-white differentiation is maintained and there is no evidence of intracranial hemorrhage, mass or mass effect. Age-related changes of the brain are present including volume loss and typical periventricular sequela of chronic small vessel ischemia. There is otherwise no evidence of intracranial hemorrhage, mass or mass effect. The ventricles are normal in size and configuration accounting for surrounding volume loss. The orbits are normal and the paranasal sinuses are grossly clear.      Impression: Age-related changes of the brain as above, otherwise without evidence of acute intracranial abnormality.   Scan performed 9:46 AM 12/15/2022. Results discussed with the stroke team by Tony Mazariegos in person 9:49 AM same day  This report was finalized on 12/15/2022  11:17 AM by Tony Mazariegos.      CT Angiogram Head w AI Analysis of LVO    Result Date: 12/15/2022  DATE OF EXAM: 12/15/2022 9:51 AM  PROCEDURE: CT ANGIOGRAM NECK-, CT ANGIOGRAM HEAD W AI ANALYSIS OF LVO-  INDICATIONS: stroke  COMPARISON: No comparisons available.  TECHNIQUE: CTA of the head and CTA of the neck was performed after the intravenous administration of 115 mL of Isovue 370. Reconstructed coronal and sagittal images were also obtained. In addition, a 3-D volume rendered image was obtained after post processing. Automated exposure control and iterative reconstruction methods were used. AI analysis of LVO was utilized for the CTA Head imaging portion of the study.  The radiation dose reduction device was turned on for each scan per the ALARA (As Low as Reasonably Achievable) protocol.  Stenosis measurement was performed by the NASCET or similar method.  FINDINGS: CTA NECK: Included subclavian arteries are tortuous, but normal in caliber.  On the right, no hemodynamically significant common carotid stenosis is seen. There is mild calcified plaque of the ICA origin, but no evidence of hemodynamically significant right ICA or ECA stenosis.  On the left, common, internal and external carotid arteries are normal in caliber. There is trace external carotid artery origin calcification and no evidence of significant atherosclerotic change elsewhere. Sagittal reconstructions appear to show mild smooth narrowing of the distal common carotid and ICA origin, but no plaque is apparent here on the axial thin section images, and again, no evidence of significant stenosis.  There is probably moderate right vertebral artery origin stenosis, images 77 through 81. Right vertebral artery is nondominant but no other focal narrowing is seen. Left vertebral artery is relatively large, normal in caliber along its length. No significant incidental disease is seen of the included lung apices, superior mediastinum, or neck soft  tissues.      Impression: No evidence of hemodynamically significant carotid or left vertebral artery stenosis in the neck. Suspected moderate origin stenosis of the nondominant right vertebral artery.    CTA HEAD: Nondominant, but otherwise normal-appearing right vertebral artery is seen with no focal stenosis. Large left vertebral artery appears normal. Basilar artery appears normal. Posterior cerebral arteries appear normal except for mild irregularity of the distal left posterior cerebral artery, which appears to show mild atherosclerotic disease. There also appears to be a moderate-sized left posterior communicating artery in addition to normal appearing left P1 segment.  There is relatively mild calcification of the cavernous carotid arteries and no evidence of hemodynamically significant carotid stenosis. Anterior and middle cerebral arteries and proximal branches are normal in caliber. There appears to be significant distal M2 level stenosis on the right, axial MIP image 18 series 901, rapid MIP image nine series 904, and milder distal disease. There is a somewhat larger right middle cerebral artery than left, and what appears to be relatively mild distal left M1 stenosis, best seen on axial MIP image 23 series 901, rapid MIP image 10 series 904. No distal branch stenosis is appreciated.  IMPRESSION:  1. Relatively mild atherosclerotic changes of the distal left posterior cerebral artery, and distal left M1 segment. 2. Potentially high-grade right M2 focal stenosis, difficult to characterize by NASCET criteria. Milder distal right M2/M3 atherosclerotic change.  This report was finalized on 12/15/2022 11:20 AM by Dr. Andrea Pfeiffer MD.      CT CEREBRAL PERFUSION WITH & WITHOUT CONTRAST    Result Date: 12/15/2022  DATE OF EXAM: 12/15/2022 9:51 AM  PROCEDURE: CT CEREBRAL PERFUSION W WO CONTRAST-  INDICATIONS: stroke  COMPARISON: Head CT scan of same date  TECHNIQUE: Routine transaxial cuts were obtained through the  head without administration of contrast. Routine transaxial cuts were then obtained through the head following the intravenous administration of 115 mL of Isovue 300. Core blood volume, core blood flow, mean transit time, and Tmax images were obtained utilizing the Rapid software protocol. A limited CT angiogram of the head was also performed to measure the blood vessel density.  The radiation dose reduction device was turned on for each scan per the ALARA (As Low as Reasonably Achievable) protocol.  FINDINGS: Rapid analysis is negative with no areas the brain showing cerebral blood flow less than 30% or T-Max greater than 6 seconds. Individual perfusion maps show no consistent focal asymmetry to suggest focal ischemia or infarct.      Impression: Negative perfusion scan.  This report was finalized on 12/15/2022 11:04 AM by Dr. Andrea Pfeiffer MD.            I have reviewed the medications:  Scheduled Meds:aspirin, 81 mg, Oral, Daily   Or  aspirin, 300 mg, Rectal, Daily  atorvastatin, 40 mg, Oral, Nightly  sodium chloride, 10 mL, Intravenous, Q12H      Continuous Infusions:   PRN Meds:.•  sodium chloride  •  sodium chloride  •  sodium chloride    Assessment & Plan   Assessment & Plan     Active Hospital Problems    Diagnosis  POA   • **Right sided weakness [R53.1]  Yes   • Essential hypertension [I10]  Yes      Resolved Hospital Problems   No resolved problems to display.        Brief Hospital Course to date:  Lu Hou is a 89 y.o. female with history of hypertension who had a wake up stroke with right sided weakness    HTN:  Holding BP meds for now, allowing permissive HTN.      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  - hemoglobin A1c normal and FLP normal  - Neurochecks  - Fall precautions    CM for discharge planning    Expected Discharge Location and Transportation: home  Expected Discharge   Expected Discharge Date and Time     Expected Discharge Date Expected  Discharge Time    Dec 17, 2022            DVT prophylaxis:  Mechanical DVT prophylaxis orders are present.          CODE STATUS:   There are no questions and answers to display.       Yi Arenas MD  12/16/22

## 2022-12-16 NOTE — PLAN OF CARE
Problem: Adult Inpatient Plan of Care  Goal: Absence of Hospital-Acquired Illness or Injury  Intervention: Prevent and Manage VTE (Venous Thromboembolism) Risk  Recent Flowsheet Documentation  Taken 12/16/2022 0359 by Susanna Blankenship RN  Activity Management: activity adjusted per tolerance  Taken 12/16/2022 0200 by Susanna Blankenship RN  Activity Management: activity adjusted per tolerance  Taken 12/16/2022 0000 by Susanna Blankenship RN  Activity Management: activity adjusted per tolerance  Taken 12/15/2022 2200 by Susanna Blankenship RN  Activity Management: activity adjusted per tolerance  Taken 12/15/2022 2031 by Susanna Blankenship RN  Activity Management: activity adjusted per tolerance  VTE Prevention/Management:   bilateral   sequential compression devices on     Problem: Adult Inpatient Plan of Care  Goal: Optimal Comfort and Wellbeing  Intervention: Provide Person-Centered Care  Recent Flowsheet Documentation  Taken 12/16/2022 0000 by Susanna Blankenship RN  Trust Relationship/Rapport: reassurance provided  Taken 12/15/2022 2031 by Susanna Blankenship RN  Trust Relationship/Rapport:   care explained   choices provided   reassurance provided   thoughts/feelings acknowledged   Goal Outcome Evaluation:      Pt is A&OX3 with no c/o pain or discomfort and slightly elevated BP. MRI completed on shift. BLE SCUDS in place. Pt is NPO until speech can evaluate r/t dysphagia. Will CTM and provide care.

## 2022-12-16 NOTE — PLAN OF CARE
Goal Outcome Evaluation:  Plan of Care Reviewed With: patient         SLP evaluation with MBS completed. Will continue to address dysphagia. Please see note for further details and recommendations.

## 2022-12-16 NOTE — PLAN OF CARE
Problem: Adult Inpatient Plan of Care  Goal: Plan of Care Review  Outcome: Ongoing, Progressing  Flowsheets (Taken 12/16/2022 1759)  Progress: no change  Plan of Care Reviewed With:   patient   son  Goal: Patient-Specific Goal (Individualized)  Outcome: Ongoing, Progressing  Goal: Absence of Hospital-Acquired Illness or Injury  Outcome: Ongoing, Progressing  Intervention: Identify and Manage Fall Risk  Recent Flowsheet Documentation  Taken 12/16/2022 1647 by Rossana Whitaker RN  Safety Promotion/Fall Prevention:   activity supervised   assistive device/personal items within reach   safety round/check completed  Taken 12/16/2022 1429 by Rossana Whitaker RN  Safety Promotion/Fall Prevention:   activity supervised   assistive device/personal items within reach   safety round/check completed  Taken 12/16/2022 1208 by Rossana Whitaker RN  Safety Promotion/Fall Prevention:   activity supervised   assistive device/personal items within reach   safety round/check completed  Taken 12/16/2022 1005 by Rossana Whitaker RN  Safety Promotion/Fall Prevention:   activity supervised   assistive device/personal items within reach   safety round/check completed  Taken 12/16/2022 0812 by Rossana Whitaker RN  Safety Promotion/Fall Prevention:   assistive device/personal items within reach   activity supervised   safety round/check completed   clutter free environment maintained   fall prevention program maintained   lighting adjusted   muscle strengthening facilitated   nonskid shoes/slippers when out of bed   room organization consistent  Intervention: Prevent Skin Injury  Recent Flowsheet Documentation  Taken 12/16/2022 1647 by Rossana Whitaker RN  Body Position: tilted  Taken 12/16/2022 1429 by Rossana Whitaker RN  Body Position:   tilted   right   weight shifting  Taken 12/16/2022 1208 by Rossana Whitaker RN  Body Position:   tilted   left  Taken 12/16/2022 1005 by Rossana Whitaker RN  Body Position: tilted  Taken 12/16/2022 0812  by Rossana Whitaker RN  Body Position: tilted  Skin Protection:   adhesive use limited   incontinence pads utilized  Intervention: Prevent and Manage VTE (Venous Thromboembolism) Risk  Recent Flowsheet Documentation  Taken 12/16/2022 1647 by Rossana Whitaker RN  Activity Management: activity adjusted per tolerance  VTE Prevention/Management:   bilateral   sequential compression devices on  Range of Motion: active ROM (range of motion) encouraged  Taken 12/16/2022 1429 by Rossana Whitaker RN  Activity Management: activity adjusted per tolerance  VTE Prevention/Management:   bilateral   sequential compression devices on  Taken 12/16/2022 1208 by Rossana Whitaker RN  Activity Management: activity adjusted per tolerance  VTE Prevention/Management:   bilateral   sequential compression devices on  Taken 12/16/2022 1005 by Rossana Whitaker RN  Activity Management: activity adjusted per tolerance  VTE Prevention/Management:   bilateral   sequential compression devices on  Taken 12/16/2022 0812 by Rossana Whitaker RN  Activity Management: activity adjusted per tolerance  VTE Prevention/Management:   bilateral   sequential compression devices on  Range of Motion: active ROM (range of motion) encouraged  Intervention: Prevent Infection  Recent Flowsheet Documentation  Taken 12/16/2022 1647 by Rossana Whitaker RN  Infection Prevention:   hand hygiene promoted   rest/sleep promoted  Taken 12/16/2022 1429 by Rossana Whitaker RN  Infection Prevention:   hand hygiene promoted   rest/sleep promoted  Taken 12/16/2022 1208 by Rossana Whitaker RN  Infection Prevention:   hand hygiene promoted   rest/sleep promoted  Taken 12/16/2022 1005 by Rossana Whitaker RN  Infection Prevention:   hand hygiene promoted   rest/sleep promoted  Taken 12/16/2022 0812 by Rossana Whitaker RN  Infection Prevention:   hand hygiene promoted   rest/sleep promoted  Goal: Optimal Comfort and Wellbeing  Outcome: Ongoing, Progressing  Intervention: Provide  Person-Centered Care  Recent Flowsheet Documentation  Taken 12/16/2022 1647 by Rossana Whitaker RN  Trust Relationship/Rapport: care explained  Taken 12/16/2022 1429 by Rossana Whitaker RN  Trust Relationship/Rapport: care explained  Taken 12/16/2022 1208 by Rossana Whitaker RN  Trust Relationship/Rapport: care explained  Taken 12/16/2022 1005 by Rossana Whitaker RN  Trust Relationship/Rapport: care explained  Taken 12/16/2022 0812 by Rossana Whitaker RN  Trust Relationship/Rapport:   care explained   choices provided   questions answered   questions encouraged   reassurance provided  Goal: Readiness for Transition of Care  Outcome: Ongoing, Progressing     Problem: Fall Injury Risk  Goal: Absence of Fall and Fall-Related Injury  Outcome: Ongoing, Progressing  Intervention: Identify and Manage Contributors  Recent Flowsheet Documentation  Taken 12/16/2022 1647 by Rossana Whitaker RN  Self-Care Promotion: independence encouraged  Taken 12/16/2022 1429 by Rossana Whitaker RN  Self-Care Promotion: independence encouraged  Taken 12/16/2022 1208 by Rossana Whitaker RN  Self-Care Promotion: independence encouraged  Taken 12/16/2022 1005 by Rossana Whitaker RN  Self-Care Promotion: independence encouraged  Taken 12/16/2022 0812 by Rossana Whitaker RN  Medication Review/Management: medications reviewed  Self-Care Promotion: independence encouraged  Intervention: Promote Injury-Free Environment  Recent Flowsheet Documentation  Taken 12/16/2022 1647 by Rossana Whitaker RN  Safety Promotion/Fall Prevention:   activity supervised   assistive device/personal items within reach   safety round/check completed  Taken 12/16/2022 1429 by Rossana Whitaker RN  Safety Promotion/Fall Prevention:   activity supervised   assistive device/personal items within reach   safety round/check completed  Taken 12/16/2022 1208 by Rossana Whitaker RN  Safety Promotion/Fall Prevention:   activity supervised   assistive device/personal items within  reach   safety round/check completed  Taken 12/16/2022 1005 by Rossana Whitaker RN  Safety Promotion/Fall Prevention:   activity supervised   assistive device/personal items within reach   safety round/check completed  Taken 12/16/2022 0812 by Rossana Whitaker RN  Safety Promotion/Fall Prevention:   assistive device/personal items within reach   activity supervised   safety round/check completed   clutter free environment maintained   fall prevention program maintained   lighting adjusted   muscle strengthening facilitated   nonskid shoes/slippers when out of bed   room organization consistent     Problem: Skin Injury Risk Increased  Goal: Skin Health and Integrity  Outcome: Ongoing, Progressing  Intervention: Optimize Skin Protection  Recent Flowsheet Documentation  Taken 12/16/2022 1647 by Rossana Whitaker RN  Head of Bed (HOB) Positioning: HOB elevated  Taken 12/16/2022 1429 by Rossana Whitaker RN  Head of Bed (HOB) Positioning: HOB at 15 degrees  Taken 12/16/2022 1208 by Rossana Whitaker RN  Head of Bed (HOB) Positioning: HOB at 15 degrees  Taken 12/16/2022 1005 by Rossana Whitaker RN  Head of Bed (HOB) Positioning: HOB elevated  Taken 12/16/2022 0812 by Rossana Whitaker RN  Pressure Reduction Techniques:   weight shift assistance provided   frequent weight shift encouraged  Head of Bed (HOB) Positioning: HOB elevated  Pressure Reduction Devices: pressure-redistributing mattress utilized  Skin Protection:   adhesive use limited   incontinence pads utilized     Problem: Muscle Strength Impairment  Goal: Improved Muscle Strength  Outcome: Ongoing, Progressing  Intervention: Optimize Muscle Strength  Recent Flowsheet Documentation  Taken 12/16/2022 1647 by Rossana Whitaker RN  Self-Care Promotion: independence encouraged  Taken 12/16/2022 1429 by Rossana Whitaker RN  Self-Care Promotion: independence encouraged   Goal Outcome Evaluation:  Plan of Care Reviewed With: patient, son      Pt currently in recliner  resting quietly. No complaints of pain or discomfort at this time. NIH of 6 this AM. Minimal effort in RUE. Facial droop exists. Vitals WNL on RA. Diet adjusted today. No other observations at this time. Will continue to monitor, call bell in reach.  Progress: no change

## 2022-12-16 NOTE — CONSULTS
Diabetes Education    Patient Name:  Lu Hou  YOB: 1924  MRN: [id number]  Admit Date:  12/15/2022      Pt does not meet criteria for diabetes education. Current A1c is 5.3 noted during chart review. Pt is on no home meds for diabetes and has no documented history of diabetes. Thank you.      Electronically signed by:  Elda Bray RN  12/16/22 08:42 EST

## 2022-12-16 NOTE — PROGRESS NOTES
"                    Clinical Nutrition     Patient Name: Lu Hou  YOB: 1924  MRN: [id number]  Date of Encounter: 12/16/22 09:59 EST  Admission date: 12/15/2022    Reason for Visit   MST score 2+ (unsure)    EMR Reviewed: Yes     Diet Nutrition Related History:      Pt admitted d/t R sided weakness. Per H&P presumed acute stroke. SLP following, currently recommends NPO and has plans for MBS. Pt noted for communication deficits. Pt's son at bedside. Obtain hx from him. Son reports not knowing pt's wt. Per son, pt eats bites of meals and pt will report being full. For breakfast pt will eat a few bites of pop tart and decaf coffee. For lunch pt will have bites of foods like [employer] or samy weenies. Dinner pt would have a pre package meal like noel and eat only bites. Son states pt's intakes have consistently stayed the same and no decrease in intakes. NKFA were reported. Diet tech unable to determine if pt wt has changed d/t no EMR hx and pt's son is unsure of UBW.     Anthropometrics   Height: Height: 157.5 cm (62\")  Admission Weight:   Flowsheet Rows    Flowsheet Row First Filed Value   Admission Height 157.5 cm (62\") Documented at 12/15/2022 0949   Admission Weight 60 kg (132 lb 4.4 oz) Documented at 12/15/2022 0949        Last Filed Weight:Weight: 60 kg (132 lb 4.4 oz) (12/15/22 0949)  Weight Method: Bed scale  BMI: BMI (Calculated): 24.2  BMI classification: Normal: 18.5-24.9kg/m2   IBW: Ideal Body Weight (IBW) (kg): 50.43     Current Nutrition Prescription     NPO Diet NPO Type: Strict NPO    Average Intake from Charting: No intakes recorded at this time    Intake History:     Intake Category  N/A    Actions   Appropriateness for MSA:  Criteria for further malnutrition exam not met at this time. Follow per protocol.     Interventions:   Follow treatment progress, Care plan reviewed, Await begin MEMO Chirinos,   Time Spent: 20  "

## 2022-12-17 PROCEDURE — 99232 SBSQ HOSP IP/OBS MODERATE 35: CPT | Performed by: NURSE PRACTITIONER

## 2022-12-17 PROCEDURE — 99233 SBSQ HOSP IP/OBS HIGH 50: CPT | Performed by: STUDENT IN AN ORGANIZED HEALTH CARE EDUCATION/TRAINING PROGRAM

## 2022-12-17 RX ORDER — LISINOPRIL 5 MG/1
5 TABLET ORAL
Status: DISCONTINUED | OUTPATIENT
Start: 2022-12-17 | End: 2022-12-19

## 2022-12-17 RX ADMIN — ASPIRIN 325 MG: 325 TABLET, COATED ORAL at 08:00

## 2022-12-17 RX ADMIN — LISINOPRIL 5 MG: 5 TABLET ORAL at 08:49

## 2022-12-17 RX ADMIN — ATORVASTATIN CALCIUM 40 MG: 40 TABLET, FILM COATED ORAL at 20:02

## 2022-12-17 NOTE — PLAN OF CARE
Goal Outcome Evaluation:                     Patient NIH- 6, resting well, permissive HTN maintained, disoriented to time and place, vitals otherwise stable, will continue to monitor

## 2022-12-17 NOTE — PLAN OF CARE
Problem: Adult Inpatient Plan of Care  Goal: Plan of Care Review  Outcome: Ongoing, Progressing  Flowsheets (Taken 12/17/2022 1827)  Progress: improving  Plan of Care Reviewed With: patient  Goal: Patient-Specific Goal (Individualized)  Outcome: Ongoing, Progressing  Goal: Absence of Hospital-Acquired Illness or Injury  Outcome: Ongoing, Progressing  Intervention: Identify and Manage Fall Risk  Recent Flowsheet Documentation  Taken 12/17/2022 1825 by Rossana Whitaker RN  Safety Promotion/Fall Prevention:   activity supervised   assistive device/personal items within reach   safety round/check completed  Taken 12/17/2022 1637 by Rossana Whitaker RN  Safety Promotion/Fall Prevention:   activity supervised   assistive device/personal items within reach   safety round/check completed  Taken 12/17/2022 1438 by Rossana Whitaker RN  Safety Promotion/Fall Prevention:   activity supervised   assistive device/personal items within reach   safety round/check completed  Taken 12/17/2022 1203 by Rossana Whitaker RN  Safety Promotion/Fall Prevention:   activity supervised   assistive device/personal items within reach   safety round/check completed  Taken 12/17/2022 1044 by Rossana Whitaker RN  Safety Promotion/Fall Prevention:   activity supervised   assistive device/personal items within reach   safety round/check completed  Intervention: Prevent Skin Injury  Recent Flowsheet Documentation  Taken 12/17/2022 1825 by Rossana Whitaker RN  Body Position: supine  Taken 12/17/2022 1637 by Rossana Whitaker RN  Body Position: supine, legs elevated  Taken 12/17/2022 1438 by Rossana Whitaker RN  Body Position:   supine   legs elevated  Taken 12/17/2022 1203 by Rossana Whitaker RN  Body Position: legs elevated  Taken 12/17/2022 1044 by Rossana Whitaker RN  Body Position: supine  Intervention: Prevent and Manage VTE (Venous Thromboembolism) Risk  Recent Flowsheet Documentation  Taken 12/17/2022 1825 by Rossana Whitaker RN  Activity  Management: activity adjusted per tolerance  VTE Prevention/Management:   bilateral   sequential compression devices on  Taken 12/17/2022 1744 by Rossana Whitaker RN  Activity Management: back to bed  Taken 12/17/2022 1637 by Rossana Whitaker RN  Activity Management: up in chair  VTE Prevention/Management:   bilateral   sequential compression devices off  Taken 12/17/2022 1438 by Rossana Whitaker RN  Activity Management: up in chair  VTE Prevention/Management:   bilateral   sequential compression devices off  Taken 12/17/2022 1203 by Rossana Whitaker RN  Activity Management: up in chair  VTE Prevention/Management: sequential compression devices off  Taken 12/17/2022 1044 by Rossana Whitaker RN  Activity Management: activity adjusted per tolerance  VTE Prevention/Management:   bilateral   sequential compression devices off  Intervention: Prevent Infection  Recent Flowsheet Documentation  Taken 12/17/2022 1825 by Rossana Whitaker RN  Infection Prevention:   hand hygiene promoted   rest/sleep promoted  Taken 12/17/2022 1637 by Rossana Whitaker RN  Infection Prevention:   hand hygiene promoted   rest/sleep promoted  Taken 12/17/2022 1438 by Rossana Whitaker RN  Infection Prevention:   hand hygiene promoted   rest/sleep promoted  Taken 12/17/2022 1203 by Rossana Whitaker RN  Infection Prevention:   hand hygiene promoted   rest/sleep promoted  Taken 12/17/2022 1044 by Rossana Whitaker RN  Infection Prevention:   hand hygiene promoted   rest/sleep promoted  Goal: Optimal Comfort and Wellbeing  Outcome: Ongoing, Progressing  Intervention: Provide Person-Centered Care  Recent Flowsheet Documentation  Taken 12/17/2022 1825 by Rossana Whitaker RN  Trust Relationship/Rapport: care explained  Taken 12/17/2022 1637 by Rossana Whitaker RN  Trust Relationship/Rapport: care explained  Taken 12/17/2022 1438 by Rossana Whitaker RN  Trust Relationship/Rapport: care explained  Taken 12/17/2022 1203 by Rossana Whitaker RN  Trust  Relationship/Rapport: care explained  Taken 12/17/2022 1044 by Rossana Whitaker RN  Trust Relationship/Rapport: care explained  Taken 12/17/2022 0810 by Rossana Whitaker RN  Trust Relationship/Rapport:   care explained   choices provided   questions answered   questions encouraged   reassurance provided  Goal: Readiness for Transition of Care  Outcome: Ongoing, Progressing     Problem: Fall Injury Risk  Goal: Absence of Fall and Fall-Related Injury  Outcome: Ongoing, Progressing  Intervention: Identify and Manage Contributors  Recent Flowsheet Documentation  Taken 12/17/2022 1825 by Rossana Whitaker RN  Self-Care Promotion: independence encouraged  Taken 12/17/2022 1637 by Rossana Whitaker RN  Self-Care Promotion: independence encouraged  Taken 12/17/2022 1438 by Rossana Whitaker RN  Self-Care Promotion: independence encouraged  Taken 12/17/2022 1203 by Rossana Whitaker RN  Self-Care Promotion: independence encouraged  Taken 12/17/2022 1044 by Rossana Whitaker RN  Self-Care Promotion: independence encouraged  Intervention: Promote Injury-Free Environment  Recent Flowsheet Documentation  Taken 12/17/2022 1825 by Rossana Whitaker RN  Safety Promotion/Fall Prevention:   activity supervised   assistive device/personal items within reach   safety round/check completed  Taken 12/17/2022 1637 by Rossana Whitaker RN  Safety Promotion/Fall Prevention:   activity supervised   assistive device/personal items within reach   safety round/check completed  Taken 12/17/2022 1438 by Rossana Whitaker RN  Safety Promotion/Fall Prevention:   activity supervised   assistive device/personal items within reach   safety round/check completed  Taken 12/17/2022 1203 by Rossana Whitaker RN  Safety Promotion/Fall Prevention:   activity supervised   assistive device/personal items within reach   safety round/check completed  Taken 12/17/2022 1044 by Rossana Whitaker RN  Safety Promotion/Fall Prevention:   activity supervised   assistive  device/personal items within reach   safety round/check completed     Problem: Skin Injury Risk Increased  Goal: Skin Health and Integrity  Outcome: Ongoing, Progressing  Intervention: Optimize Skin Protection  Recent Flowsheet Documentation  Taken 12/17/2022 1825 by Rossana Whitaker RN  Head of Bed (HOB) Positioning: HOB elevated  Taken 12/17/2022 1637 by Rossana Whitaker RN  Head of Bed (HOB) Positioning: HOB elevated  Taken 12/17/2022 1438 by Rossana Whitaker RN  Head of Bed (HOB) Positioning: HOB elevated  Taken 12/17/2022 1203 by Rossana Whitaker RN  Head of Bed (HOB) Positioning: HOB elevated  Taken 12/17/2022 1044 by Rossana Whitaker RN  Head of Bed (HOB) Positioning: HOB elevated     Problem: Muscle Strength Impairment  Goal: Improved Muscle Strength  Outcome: Ongoing, Progressing  Intervention: Optimize Muscle Strength  Recent Flowsheet Documentation  Taken 12/17/2022 1825 by Rossana Whitaker RN  Self-Care Promotion: independence encouraged  Taken 12/17/2022 1637 by Rossana Whitaker RN  Self-Care Promotion: independence encouraged  Taken 12/17/2022 1438 by Rossana Whitaker RN  Self-Care Promotion: independence encouraged  Taken 12/17/2022 1203 by Rossana Whitaker RN  Self-Care Promotion: independence encouraged  Taken 12/17/2022 1044 by Rossana Whitaker RN  Self-Care Promotion: independence encouraged   Goal Outcome Evaluation:  Plan of Care Reviewed With: patient      Pt currently in bed resting quietly. No complaints of pain or discomfort at this time. Pt 2 assist with walker. Neuro checks unchanged with right sided weakness. NIH of 7. Vitals WNL on RA. Appetite poor. No other observations at this time. Will continue to monitor, call bell in reach.  Progress: improving

## 2022-12-17 NOTE — PROGRESS NOTES
Stroke Progress Note       Chief Complaint: slurred speech and right side weakness    Subjective    Subjective     Subjective:  Patient sitting in the chair, able to talk  Continues to drool on the right side    Review of Systems   UTO    Objective      Temp:  [97.6 °F (36.4 °C)-98.1 °F (36.7 °C)] 97.7 °F (36.5 °C)  Heart Rate:  [88-96] 90  Resp:  [15-17] 16  BP: (133-215)/() 133/80     NEURO    Alert, sitting in the chair.  LANG/SPEECH able to talk, able to tell her name.    CRANIAL NERVES:    Pupils equal and reactive, EOMI intact, no gaze deviation, no nystagmus  R facial droop, cough and gag intact, shoulder shrug intact, tongue midline     MOTOR:  RUE and RLE 3/5    SENSORY:withdraws to pain on all extremities     COORDINATION: Not assessed due to patient condition    STATION: Not assessed due to patient condition    GAIT: Not assessed due to patient condition    Results Review:    I reviewed the patient's new clinical results.    Lab Results (last 24 hours)     ** No results found for the last 24 hours. **        MRI Brain Without Contrast    Result Date: 12/16/2022  Small area of acute infarct present within the left putamen, without associated hemorrhage or significant mass effect.  Age-related changes are otherwise present as above.  This report was finalized on 12/16/2022 8:48 AM by Tony Mazariegos.      FL Video Swallow With Speech Single Contrast    Result Date: 12/16/2022  Fluoroscopy provided for a modified barium swallow. Please see speech therapy report for full details and recommendations.        XR Chest 1 View    Result Date: 12/15/2022  Mild chronic interstitial and fibrotic changes of the lung fields without evidence of acute cardiopulmonary abnormality.  This report was finalized on 12/15/2022 11:16 AM by Tony Mazariegos.      Results for orders placed during the hospital encounter of 12/15/22    Adult Transthoracic Echo Complete W/ Cont if Necessary Per Protocol (With Agitated  Saline)    Interpretation Summary  •  Left ventricular systolic function is normal. Left ventricular ejection fraction appears to be 66 - 70%.  •  Left ventricular wall thickness is consistent with mild septal asymmetric hypertrophy.  •  Left ventricular diastolic function is consistent with (grade Ia w/high LAP) impaired relaxation.  •  Left atrial volume is mildly increased.  •  Mild aortic valve regurgitation is present.  •  Mild mitral annular calcification is present. There is mild calcification of the mitral valve anterior leaflet            Assessment/Plan     Assessment/Plan:  Acute ischemic stroke, left basal ganglia, likely etiology small vessel disease   -Continue full dose of aspirin for now.  -PT OT speech can continue to work with patient.    -Transthoracic echo -no thrombus, normal EF.  -Plan for skilled nursing facility  -Stroke clinic follow-up in a month      Essential hypertension-normal blood pressure goals.    Hyperlipidemia-continue high-dose of statin Lipitor of 40    No further work-up from stroke standpoint    Frederick Mcclelland MD  12/17/22  10:49 EST

## 2022-12-17 NOTE — PROGRESS NOTES
[facility] Medicine Services  PROGRESS NOTE    Patient Name: Lu Hou  : 1924  MRN: [id number]    Date of Admission: 12/15/2022  Primary Care Physician: Provider, No Known    Subjective   Subjective     CC:  stroke    HPI:  Patient is sitting up in chair in NAD. She states she feels good today. Did not eat breakfast wasn't hungry. No acute events overnight per nursing. Called son and updated on progress and plan       ROS:  Gen- No fevers, chills  CV- No chest pain, palpitations  Resp- No cough, dyspnea  GI- No N/V/D, abd pain        Objective   Objective     Vital Signs:   Temp:  [97.6 °F (36.4 °C)-98.1 °F (36.7 °C)] 97.7 °F (36.5 °C)  Heart Rate:  [88-96] 90  Resp:  [15-16] 16  BP: (133-215)/() 133/80     Physical Exam:  Constitutional: No acute distress, awake, alert  HENT: NCAT, mucous membranes moist  Respiratory: Clear to auscultation bilaterally, respiratory effort normal room air  Cardiovascular: RRR, no murmurs, rubs, or gallops  Gastrointestinal: Positive bowel sounds, soft, nontender, nondistended  Musculoskeletal: No bilateral ankle edema  Psychiatric: Appropriate affect, cooperative  Neurologic: Oriented x 3, RUE 3/5, RLE 4/5, facial droop on left side  Cranial Nerves grossly intact to confrontation, speech clear  Skin: No rashes, pale       Results Reviewed:  LAB RESULTS:      Lab 12/15/22  1000 12/15/22  0959 12/15/22  0956   WBC  --  5.45  --    HEMOGLOBIN  --  15.4  --    HEMOGLOBIN, POC 15.0  --   --    HEMATOCRIT  --  45.4  --    HEMATOCRIT POC 44  --   --    PLATELETS  --  230  --    NEUTROS ABS  --  3.18  --    IMMATURE GRANS (ABS)  --  0.02  --    LYMPHS ABS  --  1.45  --    MONOS ABS  --  0.55  --    EOS ABS  --  0.20  --    MCV  --  93.2  --    PROTIME  --   --  13.2   APTT  --  28.3  --          Lab 22  0533 12/15/22  1000 12/15/22  0959   SODIUM  --   --  142   POTASSIUM  --   --  4.3   CHLORIDE  --   --  105   CO2  --   --  24.0    ANION GAP  --   --  13.0   BUN  --   --  8   CREATININE  --  0.50* 0.55*   EGFR  --  84.9 83.0   GLUCOSE  --   --  117*   CALCIUM  --   --  9.2   HEMOGLOBIN A1C 5.30  --   --          Lab 12/15/22  0959   TOTAL PROTEIN 6.8   ALBUMIN 4.30   GLOBULIN 2.5   ALT (SGPT) 17   AST (SGOT) 28   BILIRUBIN 0.7   ALK PHOS 65         Lab 12/15/22  1043 12/15/22  0956   TROPONIN T 0.013  --    PROTIME  --  13.2   INR  --  1.1         Lab 12/16/22  0533   CHOLESTEROL 136   LDL CHOL 44   HDL CHOL 80*   TRIGLYCERIDES 53             Brief Urine Lab Results  (Last result in the past 365 days)      Color   Clarity   Blood   Leuk Est   Nitrite   Protein   CREAT   Urine HCG        12/15/22 1059 Yellow   Clear   Trace   Negative   Negative   Negative                 Microbiology Results Abnormal     Procedure Component Value - Date/Time    COVID-19 and FLU A/B PCR - Swab, Nasopharynx [[id number]]  (Normal) Collected: 12/15/22 1020    Lab Status: Final result Specimen: Swab from Nasopharynx Updated: 12/15/22 1102     COVID19 Not Detected     Influenza A PCR Not Detected     Influenza B PCR Not Detected    Narrative:      Fact sheet for providers: https://www.fda.gov/media/167913/download    Fact sheet for patients: https://www.fda.gov/media/765305/download    Test performed by PCR.          Adult Transthoracic Echo Complete W/ Cont if Necessary Per Protocol (With Agitated Saline)    Result Date: 12/16/2022  •  Left ventricular systolic function is normal. Left ventricular ejection fraction appears to be 66 - 70%. •  Left ventricular wall thickness is consistent with mild septal asymmetric hypertrophy. •  Left ventricular diastolic function is consistent with (grade Ia w/high LAP) impaired relaxation. •  Left atrial volume is mildly increased. •  Mild aortic valve regurgitation is present. •  Mild mitral annular calcification is present. There is mild calcification of the mitral valve anterior leaflet     MRI Brain Without  Contrast    Result Date: 12/16/2022  DATE OF EXAM: 12/16/2022 2:32 AM  PROCEDURE: MRI BRAIN WO CONTRAST-  INDICATIONS: Stroke, follow up  COMPARISON: No comparisons available.  TECHNIQUE: Multiplanar multisequence images of the brain were performed without contrast according to routine brain MRI protocol.  FINDINGS: Small areas of acute infarct are present within the left putamen. There is no evidence of associated hemorrhage or significant mass effect. Age-related changes are present with generalized volume loss. There is ex vacuo prominence of the lateral ventricles. The orbits are normal and the paranasal sinuses are grossly clear.      Impression: Small area of acute infarct present within the left putamen, without associated hemorrhage or significant mass effect.  Age-related changes are otherwise present as above.  This report was finalized on 12/16/2022 8:48 AM by Tony Mazariegos.      FL Video Swallow With Speech Single Contrast    Result Date: 12/16/2022  EXAMINATION: FL VIDEO SWALLOW W SPEECH SINGLE-CONTRAST-  INDICATION: dysphagia; R53.1-Weakness; I16.0-Hypertensive urgency; R47.1-Dysarthria and anarthria; R13.10-Dysphagia, unspecified  TECHNIQUE: 1 minute and 24 seconds of fluoroscopic time was used for this exam. 9 associated fluoroscopic loops were saved. The patient was evaluated in the seated lateral position while taking a variety of consistencies of barium by mouth under the direction of speech pathology.  COMPARISON: NONE  FINDINGS: 1 minute and 24 seconds of fluoroscopy provided for a modified barium swallow. Please see speech therapy report for full details and recommendations.       Impression: Fluoroscopy provided for a modified barium swallow. Please see speech therapy report for full details and recommendations.          Results for orders placed during the hospital encounter of 12/15/22    Adult Transthoracic Echo Complete W/ Cont if Necessary Per Protocol (With Agitated  Saline)    Interpretation Summary  •  Left ventricular systolic function is normal. Left ventricular ejection fraction appears to be 66 - 70%.  •  Left ventricular wall thickness is consistent with mild septal asymmetric hypertrophy.  •  Left ventricular diastolic function is consistent with (grade Ia w/high LAP) impaired relaxation.  •  Left atrial volume is mildly increased.  •  Mild aortic valve regurgitation is present.  •  Mild mitral annular calcification is present. There is mild calcification of the mitral valve anterior leaflet      I have reviewed the medications:  Scheduled Meds:aspirin, 325 mg, Oral, Daily  atorvastatin, 40 mg, Oral, Nightly  lisinopril, 5 mg, Oral, Q24H  sodium chloride, 10 mL, Intravenous, Q12H      Continuous Infusions:   PRN Meds:.•  sodium chloride  •  sodium chloride  •  sodium chloride    Assessment & Plan   Assessment & Plan     Active Hospital Problems    Diagnosis  POA   • **Right sided weakness [R53.1]  Yes   • Essential hypertension [I10]  Yes      Resolved Hospital Problems   No resolved problems to display.        Brief Hospital Course to date:  Lu Hou is a 89 y.o. female who  has a past medical history of Hypertension. she woke up this morning after urinating int he bed. Her son was unable to get her up with her falling to the right. He noticed she has a right facial droop and she came to the ED. In the Ed she was found to be very hypertensive. She had a stroke score of 5. She was evaluated as a code stroke, there was no obvious Large vessel occlusion and she is to be admitted for further evaluation.   She denies any other complaints and reports overall doing very well for a 89 yo woman.    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     AM-PAC 6 Clicks Score (PT): 11 (12/16/22 0907)    CODE STATUS:   There are no questions and answers to display.       Lela Alas, APRN  12/17/22

## 2022-12-18 LAB
ANION GAP SERPL CALCULATED.3IONS-SCNC: 10 MMOL/L (ref 5–15)
BUN SERPL-MCNC: 20 MG/DL (ref 8–23)
BUN/CREAT SERPL: 32.3 (ref 7–25)
CALCIUM SPEC-SCNC: 8.5 MG/DL (ref 8.2–9.6)
CHLORIDE SERPL-SCNC: 108 MMOL/L (ref 98–107)
CO2 SERPL-SCNC: 23 MMOL/L (ref 22–29)
CREAT SERPL-MCNC: 0.62 MG/DL (ref 0.57–1)
EGFRCR SERPLBLD CKD-EPI 2021: 80.6 ML/MIN/1.73
GLUCOSE SERPL-MCNC: 151 MG/DL (ref 65–99)
POTASSIUM SERPL-SCNC: 4 MMOL/L (ref 3.5–5.2)
SODIUM SERPL-SCNC: 141 MMOL/L (ref 136–145)

## 2022-12-18 PROCEDURE — 80048 BASIC METABOLIC PNL TOTAL CA: CPT | Performed by: NURSE PRACTITIONER

## 2022-12-18 PROCEDURE — 99232 SBSQ HOSP IP/OBS MODERATE 35: CPT | Performed by: NURSE PRACTITIONER

## 2022-12-18 RX ORDER — SODIUM CHLORIDE 9 MG/ML
50 INJECTION, SOLUTION INTRAVENOUS CONTINUOUS
Status: DISCONTINUED | OUTPATIENT
Start: 2022-12-18 | End: 2022-12-20 | Stop reason: HOSPADM

## 2022-12-18 RX ADMIN — ATORVASTATIN CALCIUM 40 MG: 40 TABLET, FILM COATED ORAL at 20:19

## 2022-12-18 RX ADMIN — Medication 10 ML: at 20:19

## 2022-12-18 RX ADMIN — SODIUM CHLORIDE 500 ML: 9 INJECTION, SOLUTION INTRAVENOUS at 06:00

## 2022-12-18 RX ADMIN — ASPIRIN 325 MG: 325 TABLET, COATED ORAL at 08:34

## 2022-12-18 RX ADMIN — SODIUM CHLORIDE 50 ML/HR: 9 INJECTION, SOLUTION INTRAVENOUS at 10:58

## 2022-12-18 RX ADMIN — Medication 10 ML: at 08:34

## 2022-12-18 RX ADMIN — LISINOPRIL 5 MG: 5 TABLET ORAL at 08:34

## 2022-12-18 NOTE — PLAN OF CARE
A&Ox3 (disoriented to place). Patient is conversant/follows command. NIH 7 for mild facial droop/right side drift/left ataxia. SBP moderately elevated at 150. Permissive HTN allowed. SR on cardiac mx. Due to void, Patient rests in bed without sx/si of pain/acute distress. Will cont to mx. Call light in reach.

## 2022-12-18 NOTE — PROGRESS NOTES
[facility] Medicine Services  PROGRESS NOTE    Patient Name: Lu Hou  : 1924  MRN: [id number]    Date of Admission: 12/15/2022  Primary Care Physician: Provider, No Known    Subjective   Subjective     CC:  stroke    HPI:  Patient is sitting up in bed in NAD. She states she is tolerating diet. Slept well. Urine output low. Talked with nurse and patient about drinking more today,.     ROS:  Gen- No fevers, chills  CV- No chest pain, palpitations  Resp- No cough, dyspnea  GI- No N/V/D, abd pain        Objective   Objective     Vital Signs:   Temp:  [97.4 °F (36.3 °C)-98.7 °F (37.1 °C)] 97.5 °F (36.4 °C)  Heart Rate:  [60-91] 89  Resp:  [16-18] 18  BP: (134-161)/(53-86) 159/72     Physical Exam:  Constitutional: No acute distress, awake, alert  HENT: NCAT, mucous membranes moist  Respiratory: Clear to auscultation bilaterally, respiratory effort normal room air 97%  Cardiovascular: RRR, no murmurs, rubs, or gallops  Gastrointestinal: Positive bowel sounds, soft, nontender, nondistended  Musculoskeletal: No bilateral ankle edema  Psychiatric: Appropriate affect, cooperative  Neurologic: Oriented x 3, RUE 3/5, RLE 4/5, facial droop on left side  Cranial Nerves grossly intact to confrontation, speech clear  Skin: No rashes, pale       Results Reviewed:  LAB RESULTS:      Lab 12/15/22  1000 12/15/22  0959 12/15/22  0956   WBC  --  5.45  --    HEMOGLOBIN  --  15.4  --    HEMOGLOBIN, POC 15.0  --   --    HEMATOCRIT  --  45.4  --    HEMATOCRIT POC 44  --   --    PLATELETS  --  230  --    NEUTROS ABS  --  3.18  --    IMMATURE GRANS (ABS)  --  0.02  --    LYMPHS ABS  --  1.45  --    MONOS ABS  --  0.55  --    EOS ABS  --  0.20  --    MCV  --  93.2  --    PROTIME  --   --  13.2   APTT  --  28.3  --          Lab 22  0533 12/15/22  1000 12/15/22  0959   SODIUM  --   --  142   POTASSIUM  --   --  4.3   CHLORIDE  --   --  105   CO2  --   --  24.0   ANION GAP  --   --  13.0   BUN  --    --  8   CREATININE  --  0.50* 0.55*   EGFR  --  84.9 83.0   GLUCOSE  --   --  117*   CALCIUM  --   --  9.2   HEMOGLOBIN A1C 5.30  --   --          Lab 12/15/22  0959   TOTAL PROTEIN 6.8   ALBUMIN 4.30   GLOBULIN 2.5   ALT (SGPT) 17   AST (SGOT) 28   BILIRUBIN 0.7   ALK PHOS 65         Lab 12/15/22  1043 12/15/22  0956   TROPONIN T 0.013  --    PROTIME  --  13.2   INR  --  1.1         Lab 12/16/22  0533   CHOLESTEROL 136   LDL CHOL 44   HDL CHOL 80*   TRIGLYCERIDES 53             Brief Urine Lab Results  (Last result in the past 365 days)      Color   Clarity   Blood   Leuk Est   Nitrite   Protein   CREAT   Urine HCG        12/15/22 1059 Yellow   Clear   Trace   Negative   Negative   Negative                 Microbiology Results Abnormal     Procedure Component Value - Date/Time    COVID-19 and FLU A/B PCR - Swab, Nasopharynx [[id number]]  (Normal) Collected: 12/15/22 1020    Lab Status: Final result Specimen: Swab from Nasopharynx Updated: 12/15/22 1102     COVID19 Not Detected     Influenza A PCR Not Detected     Influenza B PCR Not Detected    Narrative:      Fact sheet for providers: https://www.fda.gov/media/380504/download    Fact sheet for patients: https://www.fda.gov/media/230448/download    Test performed by PCR.          Adult Transthoracic Echo Complete W/ Cont if Necessary Per Protocol (With Agitated Saline)    Result Date: 12/16/2022  •  Left ventricular systolic function is normal. Left ventricular ejection fraction appears to be 66 - 70%. •  Left ventricular wall thickness is consistent with mild septal asymmetric hypertrophy. •  Left ventricular diastolic function is consistent with (grade Ia w/high LAP) impaired relaxation. •  Left atrial volume is mildly increased. •  Mild aortic valve regurgitation is present. •  Mild mitral annular calcification is present. There is mild calcification of the mitral valve anterior leaflet     FL Video Swallow With Speech Single Contrast    Result Date:  12/16/2022  EXAMINATION: FL VIDEO SWALLOW W SPEECH SINGLE-CONTRAST-  INDICATION: dysphagia; R53.1-Weakness; I16.0-Hypertensive urgency; R47.1-Dysarthria and anarthria; R13.10-Dysphagia, unspecified  TECHNIQUE: 1 minute and 24 seconds of fluoroscopic time was used for this exam. 9 associated fluoroscopic loops were saved. The patient was evaluated in the seated lateral position while taking a variety of consistencies of barium by mouth under the direction of speech pathology.  COMPARISON: NONE  FINDINGS: 1 minute and 24 seconds of fluoroscopy provided for a modified barium swallow. Please see speech therapy report for full details and recommendations.       Impression: Fluoroscopy provided for a modified barium swallow. Please see speech therapy report for full details and recommendations.          Results for orders placed during the hospital encounter of 12/15/22    Adult Transthoracic Echo Complete W/ Cont if Necessary Per Protocol (With Agitated Saline)    Interpretation Summary  •  Left ventricular systolic function is normal. Left ventricular ejection fraction appears to be 66 - 70%.  •  Left ventricular wall thickness is consistent with mild septal asymmetric hypertrophy.  •  Left ventricular diastolic function is consistent with (grade Ia w/high LAP) impaired relaxation.  •  Left atrial volume is mildly increased.  •  Mild aortic valve regurgitation is present.  •  Mild mitral annular calcification is present. There is mild calcification of the mitral valve anterior leaflet      I have reviewed the medications:  Scheduled Meds:aspirin, 325 mg, Oral, Daily  atorvastatin, 40 mg, Oral, Nightly  lisinopril, 5 mg, Oral, Q24H  sodium chloride, 10 mL, Intravenous, Q12H      Continuous Infusions:sodium chloride, 50 mL/hr      PRN Meds:.•  sodium chloride  •  sodium chloride  •  sodium chloride    Assessment & Plan   Assessment & Plan     Active Hospital Problems    Diagnosis  POA   • **Right sided weakness [R53.1]   Yes   • Essential hypertension [I10]  Yes      Resolved Hospital Problems   No resolved problems to display.        Brief Hospital Course to date:  Lu Hou is a 89 y.o. female who  has a past medical history of Hypertension. she woke up this morning after urinating int he bed. Her son was unable to get her up with her falling to the right. He noticed she has a right facial droop and she came to the ED. In the Ed she was found to be very hypertensive. She had a stroke score of 5. She was evaluated as a code stroke, there was no obvious Large vessel occlusion and she is to be admitted for further evaluation.   She denies any other complaints and reports overall doing very well for a 89 yo woman.    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     AM-PAC 6 Clicks Score (PT): 11 (12/16/22 5857)    CODE STATUS:   There are no questions and answers to display.       Lela Alas, LIDIA  12/18/22

## 2022-12-18 NOTE — PLAN OF CARE
Problem: Adult Inpatient Plan of Care  Goal: Plan of Care Review  Outcome: Ongoing, Progressing  Flowsheets (Taken 12/18/2022 1713)  Progress: improving  Plan of Care Reviewed With: patient  Goal: Patient-Specific Goal (Individualized)  Outcome: Ongoing, Progressing  Goal: Absence of Hospital-Acquired Illness or Injury  Outcome: Ongoing, Progressing  Intervention: Identify and Manage Fall Risk  Recent Flowsheet Documentation  Taken 12/18/2022 1621 by Rossana Whitaker RN  Safety Promotion/Fall Prevention:   activity supervised   assistive device/personal items within reach   safety round/check completed  Taken 12/18/2022 1434 by Rossana Whitaker RN  Safety Promotion/Fall Prevention:   activity supervised   assistive device/personal items within reach   safety round/check completed  Taken 12/18/2022 1200 by Rossana Whitaker RN  Safety Promotion/Fall Prevention:   activity supervised   assistive device/personal items within reach   safety round/check completed  Taken 12/18/2022 1040 by Rossana Whitaker RN  Safety Promotion/Fall Prevention:   activity supervised   assistive device/personal items within reach   safety round/check completed  Taken 12/18/2022 0831 by Rossana Whitaker RN  Safety Promotion/Fall Prevention:   activity supervised   assistive device/personal items within reach   clutter free environment maintained   fall prevention program maintained   lighting adjusted   muscle strengthening facilitated   nonskid shoes/slippers when out of bed   room organization consistent   safety round/check completed  Intervention: Prevent Skin Injury  Recent Flowsheet Documentation  Taken 12/18/2022 1621 by Rossana Whitaker RN  Body Position:   tilted   left   weight shifting  Taken 12/18/2022 1434 by Rossana Whitaker RN  Body Position: tilted  Taken 12/18/2022 1200 by Rossana Whitaker RN  Body Position:   sitting up in bed   tilted  Taken 12/18/2022 1040 by Rossana Whitaker RN  Body Position:   tilted   left  Taken  12/18/2022 0831 by Rossana Whitaker RN  Body Position: supine  Skin Protection:   adhesive use limited   incontinence pads utilized  Intervention: Prevent and Manage VTE (Venous Thromboembolism) Risk  Recent Flowsheet Documentation  Taken 12/18/2022 1621 by Rossana Whitaker RN  Activity Management: activity adjusted per tolerance  VTE Prevention/Management:   bilateral   sequential compression devices on  Range of Motion: active ROM (range of motion) encouraged  Taken 12/18/2022 1434 by Rossana Whitaker RN  Activity Management: activity adjusted per tolerance  VTE Prevention/Management:   bilateral   sequential compression devices on  Taken 12/18/2022 1200 by Rossana Whitaker RN  Activity Management: activity adjusted per tolerance  VTE Prevention/Management:   bilateral   sequential compression devices on  Range of Motion: active ROM (range of motion) encouraged  Taken 12/18/2022 1040 by Rossana Whitaker RN  Activity Management: activity adjusted per tolerance  VTE Prevention/Management:   bilateral   sequential compression devices on  Taken 12/18/2022 0831 by Rossana Whitaker RN  Activity Management: activity adjusted per tolerance  VTE Prevention/Management:   bilateral   sequential compression devices on  Range of Motion: active ROM (range of motion) encouraged  Intervention: Prevent Infection  Recent Flowsheet Documentation  Taken 12/18/2022 1621 by Rossana Whitaker RN  Infection Prevention:   hand hygiene promoted   rest/sleep promoted  Taken 12/18/2022 1434 by Rossana Whitaker RN  Infection Prevention:   hand hygiene promoted   rest/sleep promoted  Taken 12/18/2022 1200 by Rossana Whitaker RN  Infection Prevention:   hand hygiene promoted   rest/sleep promoted  Taken 12/18/2022 1040 by Rossana Whitaker RN  Infection Prevention:   hand hygiene promoted   rest/sleep promoted  Taken 12/18/2022 0831 by Rossana Whitaker RN  Infection Prevention:   hand hygiene promoted   rest/sleep promoted  Goal: Optimal Comfort  and Wellbeing  Outcome: Ongoing, Progressing  Intervention: Provide Person-Centered Care  Recent Flowsheet Documentation  Taken 12/18/2022 1621 by Rossana Whitaker RN  Trust Relationship/Rapport: care explained  Taken 12/18/2022 1434 by Rossana Whitaker RN  Trust Relationship/Rapport: care explained  Taken 12/18/2022 1200 by Rossana Whitaker RN  Trust Relationship/Rapport: care explained  Taken 12/18/2022 1040 by Rossana Whitaker RN  Trust Relationship/Rapport: care explained  Taken 12/18/2022 0831 by Rossana Whitaker RN  Trust Relationship/Rapport:   care explained   choices provided   questions answered   reassurance provided   questions encouraged  Goal: Readiness for Transition of Care  Outcome: Ongoing, Progressing     Problem: Fall Injury Risk  Goal: Absence of Fall and Fall-Related Injury  Outcome: Ongoing, Progressing  Intervention: Identify and Manage Contributors  Recent Flowsheet Documentation  Taken 12/18/2022 1621 by Rossana Whitaker RN  Self-Care Promotion: independence encouraged  Taken 12/18/2022 1434 by Rossana Whitaker RN  Self-Care Promotion: independence encouraged  Taken 12/18/2022 1200 by Rossana Whitaker RN  Self-Care Promotion: independence encouraged  Taken 12/18/2022 1040 by Rossana Whitaker RN  Self-Care Promotion: independence encouraged  Taken 12/18/2022 0831 by Rossana Whitaker RN  Medication Review/Management: medications reviewed  Self-Care Promotion: independence encouraged  Intervention: Promote Injury-Free Environment  Recent Flowsheet Documentation  Taken 12/18/2022 1621 by Rossana Whitaker RN  Safety Promotion/Fall Prevention:   activity supervised   assistive device/personal items within reach   safety round/check completed  Taken 12/18/2022 1434 by Rossana Whitaker RN  Safety Promotion/Fall Prevention:   activity supervised   assistive device/personal items within reach   safety round/check completed  Taken 12/18/2022 1200 by Rossana Whitaker RN  Safety Promotion/Fall  Prevention:   activity supervised   assistive device/personal items within reach   safety round/check completed  Taken 12/18/2022 1040 by Rossana Whitaker RN  Safety Promotion/Fall Prevention:   activity supervised   assistive device/personal items within reach   safety round/check completed  Taken 12/18/2022 0831 by Rossana Whitaker RN  Safety Promotion/Fall Prevention:   activity supervised   assistive device/personal items within reach   clutter free environment maintained   fall prevention program maintained   lighting adjusted   muscle strengthening facilitated   nonskid shoes/slippers when out of bed   room organization consistent   safety round/check completed     Problem: Skin Injury Risk Increased  Goal: Skin Health and Integrity  Outcome: Ongoing, Progressing  Intervention: Optimize Skin Protection  Recent Flowsheet Documentation  Taken 12/18/2022 1621 by Rossana Whitaker RN  Head of Bed (HOB) Positioning: HOB elevated  Taken 12/18/2022 1434 by Rossana Whitaker RN  Head of Bed (HOB) Positioning: HOB elevated  Taken 12/18/2022 1200 by Rossana Whitaker RN  Head of Bed (HOB) Positioning: HOB elevated  Taken 12/18/2022 1040 by Rossana Whitaker RN  Head of Bed (HOB) Positioning: HOB at 15 degrees  Taken 12/18/2022 0831 by Rossana Whitaker RN  Pressure Reduction Techniques: weight shift assistance provided  Head of Bed (HOB) Positioning: HOB elevated  Pressure Reduction Devices: pressure-redistributing mattress utilized  Skin Protection:   adhesive use limited   incontinence pads utilized     Problem: Muscle Strength Impairment  Goal: Improved Muscle Strength  Outcome: Ongoing, Progressing  Intervention: Optimize Muscle Strength  Recent Flowsheet Documentation  Taken 12/18/2022 1621 by Rossana Whitaker RN  Self-Care Promotion: independence encouraged  Taken 12/18/2022 1434 by Rossana Whitaker RN  Self-Care Promotion: independence encouraged  Taken 12/18/2022 1200 by Rossana Whitaker RN  Self-Care Promotion:  independence encouraged  Taken 12/18/2022 1040 by Rossana Whitaker RN  Self-Care Promotion: independence encouraged  Taken 12/18/2022 0831 by Rossana Whitaker RN  Self-Care Promotion: independence encouraged     Problem: Pain Acute  Goal: Acceptable Pain Control and Functional Ability  Outcome: Ongoing, Progressing  Intervention: Prevent or Manage Pain  Recent Flowsheet Documentation  Taken 12/18/2022 0831 by Rossana Whitaker RN  Medication Review/Management: medications reviewed  Intervention: Optimize Psychosocial Wellbeing  Recent Flowsheet Documentation  Taken 12/18/2022 0831 by Rossana Whitaker RN  Diversional Activities: television     Problem: Adjustment to Illness (Stroke, Hemorrhagic)  Goal: Optimal Coping  Outcome: Ongoing, Progressing  Intervention: Support Psychosocial Response to Stroke  Recent Flowsheet Documentation  Taken 12/18/2022 0831 by Rossana Whitaker RN  Family/Support System Care:   self-care encouraged   support provided     Problem: Bowel Elimination Impaired (Stroke, Hemorrhagic)  Goal: Effective Bowel Elimination  Outcome: Ongoing, Progressing     Problem: Cerebral Tissue Perfusion (Stroke, Hemorrhagic)  Goal: Optimal Cerebral Tissue Perfusion  Outcome: Ongoing, Progressing  Intervention: Protect and Optimize Cerebral Perfusion  Recent Flowsheet Documentation  Taken 12/18/2022 0831 by Rossana Whitaker RN  Stabilization Measures: airway opened  Fluid/Electrolyte Management: fluids provided     Problem: Cognitive Impairment (Stroke, Hemorrhagic)  Goal: Optimal Cognitive Function  Outcome: Ongoing, Progressing  Intervention: Optimize Cognitive Function  Recent Flowsheet Documentation  Taken 12/18/2022 0831 by Rossana Whitaker RN  Reorientation Measures:   clock in view   reorientation provided     Problem: Communication Impairment (Stroke, Hemorrhagic)  Goal: Effective Communication Skills  Outcome: Ongoing, Progressing  Intervention: Optimize Communication Skills  Recent Flowsheet  Documentation  Taken 12/18/2022 0831 by Rossana Whitaker RN  Communication Enhancement Strategies: call light answered in person     Problem: Functional Ability Impaired (Stroke, Hemorrhagic)  Goal: Optimal Functional Ability  Outcome: Ongoing, Progressing  Intervention: Optimize Functional Ability  Recent Flowsheet Documentation  Taken 12/18/2022 1621 by Rossana Whitaker RN  Activity Management: activity adjusted per tolerance  Self-Care Promotion: independence encouraged  Taken 12/18/2022 1434 by Rossana Whitaker RN  Activity Management: activity adjusted per tolerance  Self-Care Promotion: independence encouraged  Taken 12/18/2022 1200 by Rossana Whitaker RN  Activity Management: activity adjusted per tolerance  Self-Care Promotion: independence encouraged  Taken 12/18/2022 1040 by Rossana Whitaker RN  Activity Management: activity adjusted per tolerance  Self-Care Promotion: independence encouraged  Taken 12/18/2022 0831 by Rossana Whitaker RN  Activity Management: activity adjusted per tolerance  Self-Care Promotion: independence encouraged     Problem: Pain (Stroke, Hemorrhagic)  Goal: Acceptable Pain Control  Outcome: Ongoing, Progressing     Problem: Respiratory Compromise (Stroke, Hemorrhagic)  Goal: Effective Oxygenation and Ventilation  Outcome: Ongoing, Progressing  Intervention: Optimize Oxygenation and Ventilation  Recent Flowsheet Documentation  Taken 12/18/2022 1621 by Rossana Whitaker RN  Head of Bed (HOB) Positioning: HOB elevated  Taken 12/18/2022 1434 by Rossana Whitaker RN  Head of Bed (HOB) Positioning: HOB elevated  Taken 12/18/2022 1200 by Rossana Whitaker RN  Head of Bed (HOB) Positioning: HOB elevated  Taken 12/18/2022 1040 by Rossana Whitaker RN  Head of Bed (HOB) Positioning: HOB at 15 degrees  Taken 12/18/2022 0831 by Rossana Whitaker RN  Head of Bed (HOB) Positioning: HOB elevated     Problem: Sensorimotor Impairment (Stroke, Hemorrhagic)  Goal: Improved Sensorimotor Function  Outcome:  Ongoing, Progressing  Intervention: Optimize Range of Motion, Motor Control and Function  Recent Flowsheet Documentation  Taken 12/18/2022 1621 by Rossana Whitaker RN  Positioning/Transfer Devices:   pillows   in use  Range of Motion: active ROM (range of motion) encouraged  Taken 12/18/2022 1434 by Rossana Whitaker RN  Positioning/Transfer Devices:   pillows   in use  Taken 12/18/2022 1200 by Rossana Whitaker RN  Positioning/Transfer Devices:   pillows   in use  Range of Motion: active ROM (range of motion) encouraged  Taken 12/18/2022 1040 by Rossana Whitaker RN  Positioning/Transfer Devices:   pillows   in use  Taken 12/18/2022 0831 by Rossana Whitaker RN  Positioning/Transfer Devices:   pillows   in use  Range of Motion: active ROM (range of motion) encouraged  Intervention: Optimize Sensory and Perceptual Ability  Recent Flowsheet Documentation  Taken 12/18/2022 0831 by Rossana Whitaker RN  Pressure Reduction Techniques: weight shift assistance provided  Pressure Reduction Devices: pressure-redistributing mattress utilized     Problem: Swallowing Impairment (Stroke, Hemorrhagic)  Goal: Optimal Eating and Swallowing Without Aspiration  Outcome: Ongoing, Progressing  Intervention: Optimize Eating and Swallowing  Recent Flowsheet Documentation  Taken 12/18/2022 0831 by Rossana Whitaker RN  Aspiration Precautions: awake/alert before oral intake     Problem: Urinary Elimination Impaired (Stroke, Hemorrhagic)  Goal: Effective Urinary Elimination  Outcome: Ongoing, Progressing   Goal Outcome Evaluation:  Plan of Care Reviewed With: patient      Pt currently in bed resting quietly. No complaints of pain or discomfort at this time. Vitals WNL. NIH of 7. Neuro checks unchanged. Pt son visited and left his cell phone. Phone was place in bedside table. Pt turned q2 while in bed to prevent skin break down. IV fluids running to promote urinary output. No other observations at this time. Will continue to monitor, call bell in  reach.  Progress: improving

## 2022-12-19 ENCOUNTER — APPOINTMENT (OUTPATIENT)
Dept: GENERAL RADIOLOGY | Facility: HOSPITAL | Age: 87
End: 2022-12-19

## 2022-12-19 PROCEDURE — 99232 SBSQ HOSP IP/OBS MODERATE 35: CPT | Performed by: NURSE PRACTITIONER

## 2022-12-19 PROCEDURE — 73030 X-RAY EXAM OF SHOULDER: CPT

## 2022-12-19 PROCEDURE — 97530 THERAPEUTIC ACTIVITIES: CPT

## 2022-12-19 PROCEDURE — 97110 THERAPEUTIC EXERCISES: CPT

## 2022-12-19 RX ORDER — LISINOPRIL 10 MG/1
10 TABLET ORAL
Status: DISCONTINUED | OUTPATIENT
Start: 2022-12-20 | End: 2022-12-20 | Stop reason: HOSPADM

## 2022-12-19 RX ORDER — LIDOCAINE 50 MG/G
1 PATCH TOPICAL
Status: DISCONTINUED | OUTPATIENT
Start: 2022-12-19 | End: 2022-12-20 | Stop reason: HOSPADM

## 2022-12-19 RX ADMIN — LIDOCAINE 1 PATCH: 50 PATCH CUTANEOUS at 11:53

## 2022-12-19 RX ADMIN — Medication 10 ML: at 20:30

## 2022-12-19 RX ADMIN — LISINOPRIL 5 MG: 5 TABLET ORAL at 09:19

## 2022-12-19 RX ADMIN — ASPIRIN 325 MG: 325 TABLET, COATED ORAL at 09:20

## 2022-12-19 RX ADMIN — ATORVASTATIN CALCIUM 40 MG: 40 TABLET, FILM COATED ORAL at 20:30

## 2022-12-19 RX ADMIN — Medication 10 ML: at 09:20

## 2022-12-19 NOTE — PLAN OF CARE
Goal Outcome Evaluation:  Plan of Care Reviewed With: patient        Progress: no change     Pt is alert and oriented X 3. VSS. RA. Voiding spontaneously. NIH 7.

## 2022-12-19 NOTE — PROGRESS NOTES
[facility] Medicine Services  PROGRESS NOTE    Patient Name: Lu Hou  : 1924  MRN: [id number]    Date of Admission: 12/15/2022  Primary Care Physician: Provider, No Known    Subjective   Subjective     CC:  stroke    HPI:  Patient is resting in bed in NAD. She feels good. Tolerating diet. she is having pain in her shoulder when she tries to raise her right arm. Urine output improved      ROS:  Gen- No fevers, chills  CV- No chest pain, palpitations  Resp- No cough, dyspnea  GI- No N/V/D, abd pain        Objective   Objective     Vital Signs:   Temp:  [97.6 °F (36.4 °C)-98 °F (36.7 °C)] 97.8 °F (36.6 °C)  Heart Rate:  [56-89] 71  Resp:  [18] 18  BP: (140-183)/(65-85) 161/75     Physical Exam:  Constitutional: No acute distress, awake, alert  HENT: NCAT, mucous membranes moist  Respiratory: Clear to auscultation bilaterally, respiratory effort normal room air 97%  Cardiovascular: RRR, no murmurs, rubs, or gallops  Gastrointestinal: Positive bowel sounds, soft, nontender, nondistended  Musculoskeletal: No bilateral ankle edema  Psychiatric: Appropriate affect, cooperative  Neurologic: Oriented x 3, RUE 3/5, RLE 4/5, facial droop on left side  Cranial Nerves grossly intact to confrontation, speech clear, limited rom in right arm   Skin: No rashes, pale       Results Reviewed:  LAB RESULTS:      Lab 12/15/22  1000 12/15/22  0959 12/15/22  0956   WBC  --  5.45  --    HEMOGLOBIN  --  15.4  --    HEMOGLOBIN, POC 15.0  --   --    HEMATOCRIT  --  45.4  --    HEMATOCRIT POC 44  --   --    PLATELETS  --  230  --    NEUTROS ABS  --  3.18  --    IMMATURE GRANS (ABS)  --  0.02  --    LYMPHS ABS  --  1.45  --    MONOS ABS  --  0.55  --    EOS ABS  --  0.20  --    MCV  --  93.2  --    PROTIME  --   --  13.2   APTT  --  28.3  --          Lab 22  1109 22  0533 12/15/22  1000 12/15/22  0959   SODIUM 141  --   --  142   POTASSIUM 4.0  --   --  4.3   CHLORIDE 108*  --   --  105    CO2 23.0  --   --  24.0   ANION GAP 10.0  --   --  13.0   BUN 20  --   --  8   CREATININE 0.62  --  0.50* 0.55*   EGFR 80.6  --  84.9 83.0   GLUCOSE 151*  --   --  117*   CALCIUM 8.5  --   --  9.2   HEMOGLOBIN A1C  --  5.30  --   --          Lab 12/15/22  0959   TOTAL PROTEIN 6.8   ALBUMIN 4.30   GLOBULIN 2.5   ALT (SGPT) 17   AST (SGOT) 28   BILIRUBIN 0.7   ALK PHOS 65         Lab 12/15/22  1043 12/15/22  0956   TROPONIN T 0.013  --    PROTIME  --  13.2   INR  --  1.1         Lab 12/16/22  0533   CHOLESTEROL 136   LDL CHOL 44   HDL CHOL 80*   TRIGLYCERIDES 53             Brief Urine Lab Results  (Last result in the past 365 days)      Color   Clarity   Blood   Leuk Est   Nitrite   Protein   CREAT   Urine HCG        12/15/22 1059 Yellow   Clear   Trace   Negative   Negative   Negative                 Microbiology Results Abnormal     Procedure Component Value - Date/Time    COVID-19 and FLU A/B PCR - Swab, Nasopharynx [[id number]]  (Normal) Collected: 12/15/22 1020    Lab Status: Final result Specimen: Swab from Nasopharynx Updated: 12/15/22 1102     COVID19 Not Detected     Influenza A PCR Not Detected     Influenza B PCR Not Detected    Narrative:      Fact sheet for providers: https://www.fda.gov/media/715016/download    Fact sheet for patients: https://www.fda.gov/media/158097/download    Test performed by PCR.          XR Shoulder 2+ View Right    Result Date: 12/19/2022  DATE OF EXAM: 12/19/2022 8:45 AM  PROCEDURE: XR SHOULDER 2+ VW RIGHT-  INDICATIONS: fall; R53.1-Weakness; I16.0-Hypertensive urgency; R47.1-Dysarthria and anarthria; R13.10-Dysphagia, unspecified  COMPARISON: No comparisons available.  TECHNIQUE: A minimum of two radiologic views of the right shoulder were obtained.  FINDINGS: Patient is status post ORIF of the proximal right humerus. Intramedullary tammi extending from the humeral head noted appears intact. Locking and interfragmentary screws appear intact. No acute fracture or dislocation is  noted. Moderate degenerative changes at the glenohumeral joint and mild degenerative changes at the AC joint are noted. Subtle findings are limited by diffuse osteopenia. Limited imaging of the chest shows no acute abnormality.      Impression: Prior ORIF proximal right humerus  Degenerative changes noted of the shoulder  No definite acute osseous abnormality given limitations from osteopenia  This report was finalized on 12/19/2022 9:43 AM by Primitivo Davidson.        Results for orders placed during the hospital encounter of 12/15/22    Adult Transthoracic Echo Complete W/ Cont if Necessary Per Protocol (With Agitated Saline)    Interpretation Summary  •  Left ventricular systolic function is normal. Left ventricular ejection fraction appears to be 66 - 70%.  •  Left ventricular wall thickness is consistent with mild septal asymmetric hypertrophy.  •  Left ventricular diastolic function is consistent with (grade Ia w/high LAP) impaired relaxation.  •  Left atrial volume is mildly increased.  •  Mild aortic valve regurgitation is present.  •  Mild mitral annular calcification is present. There is mild calcification of the mitral valve anterior leaflet      I have reviewed the medications:  Scheduled Meds:aspirin, 325 mg, Oral, Daily  atorvastatin, 40 mg, Oral, Nightly  lidocaine, 1 patch, Transdermal, Q24H  lisinopril, 5 mg, Oral, Q24H  sodium chloride, 10 mL, Intravenous, Q12H      Continuous Infusions:sodium chloride, 50 mL/hr, Last Rate: 50 mL/hr (12/18/22 1058)      PRN Meds:.•  sodium chloride  •  sodium chloride  •  sodium chloride    Assessment & Plan   Assessment & Plan     Active Hospital Problems    Diagnosis  POA   • **Right sided weakness [R53.1]  Yes   • Essential hypertension [I10]  Yes      Resolved Hospital Problems   No resolved problems to display.        Brief Hospital Course to date:  Lu Hou is a 89 y.o. female who  has a past medical history of Hypertension. she woke up this morning  after urinating int he bed. Her son was unable to get her up with her falling to the right. He noticed she has a right facial droop and she came to the ED. In the Ed she was found to be very hypertensive. She had a stroke score of 5. She was evaluated as a code stroke, there was no obvious Large vessel occlusion and she is to be admitted for further evaluation.   She denies any other complaints and reports overall doing very well for a 89 yo woman.    Plan was partially entered by my partner and I have reviewed and updated as appropriate on 12/19/22    HTN:    -- normal bp goals  -- start low dose lisinopril and monitor   --increase lisinopril to 10 mg    Decreased UOP  -- NS bolus given 12/18  -- s/p gentle IV fluids and encourage oral intake  -- output improved     Right shoulder pain  -- xray of shoulder showed no acute fracture and prior ORIF  -- lidoderm patch to shoulder        L putamen ischemic stroke seen on MRI  - Consult PT/OT/SLP they recommend IPR  - ECHO 66-70%  - hemoglobin A1c normal and FLP normal  - Neurochecks  - Fall precautions  -- cont ASA and lipitor   -- follow up in stroke clinic one month       Expected Discharge Location and Transportation: rehab   Expected Discharge 12/20  Expected Discharge Date and Time     Expected Discharge Date Expected Discharge Time    Dec 23, 2022            DVT prophylaxis:  Mechanical DVT prophylaxis orders are present.     AM-PAC 6 Clicks Score (PT): 11 (12/16/22 2697)    CODE STATUS:   There are no questions and answers to display.       Lela Alas, APRN  12/19/22

## 2022-12-19 NOTE — PLAN OF CARE
Goal Outcome Evaluation:  Plan of Care Reviewed With: patient      Patient has had pain in her right shoulder, lidocaine patch applied and x-ray done. VSS, on room air and NSR per tele. NIH=6. Pt. Worked with therapy and has been sitting up in the chair for several hours. Awaiting rehab placement.

## 2022-12-19 NOTE — PLAN OF CARE
Goal Outcome Evaluation:  Plan of Care Reviewed With: patient        Progress: improving  Outcome Evaluation: Pt appears to be demonstrating small improvements in RLE strength/mobility this session, although continues to require mod-maxA for mobility. Pt performed bed mobility with ModA, sit to stand transfers with ModA and RW, and stand pivot transfers from bed to recliner with MaxA and BUE support. PT recommending SNF at [place].

## 2022-12-19 NOTE — THERAPY TREATMENT NOTE
Patient Name: Lu Hou  : 1924    MRN: [id number]                              Today's Date: 2022       Admit Date: 12/15/2022    Visit Dx:     ICD-10-CM ICD-9-CM   1. Acute right-sided weakness  R53.1 728.87   2. Hypertensive urgency  I16.0 401.9   3. Dysarthria  R47.1 784.51   4. Dysphagia, unspecified type  R13.10 787.20     Patient Active Problem List   Diagnosis   • Right sided weakness   • Essential hypertension     Past Medical History:   Diagnosis Date   • Hypertension      History reviewed. No pertinent surgical history.   General Information     Row Name 22 1155          Physical Therapy Time and Intention    Document Type therapy note (daily note)  -     Mode of Treatment physical therapy;individual therapy  -     Row Name 22 1152          General Information    Existing Precautions/Restrictions fall;other (see comments)  R sided weaknessk, inattention, incontinent  -     Barriers to Rehab medically complex;previous functional deficit;visual deficit;impaired sensation  -     Row Name 22 115          Cognition    Orientation Status (Cognition) oriented x 3  -     Row Name 22 1152          Safety Issues, Functional Mobility    Safety Issues Affecting Function (Mobility) awareness of need for assistance;insight into deficits/self-awareness;positioning of assistive device;safety precaution awareness;safety precautions follow-through/compliance;sequencing abilities  -     Impairments Affecting Function (Mobility) balance;cognition;coordination;endurance/activity tolerance;grasp;motor control;postural/trunk control;range of motion (ROM);sensation/sensory awareness;strength;motor planning  -           User Key  (r) = Recorded By, (t) = Taken By, (c) = Cosigned By    Initials Name Provider Type     Gaetano Ricardo, PT Physical Therapist               Mobility     Row Name 22 1156          Bed Mobility    Bed Mobility supine-sit  -      Supine-Sit Harper (Bed Mobility) moderate assist (50% patient effort);verbal cues;2 person assist  -     Assistive Device (Bed Mobility) bed rails;draw sheet;head of bed elevated  -     Comment, (Bed Mobility) Pt required verbal/tactile cues for proper sequencing and hand placement. Pt with decreased R hand grasp limiting ability to use bed rails for assist.  -     Row Name 12/19/22 1156          Bed-Chair Transfer    Bed-Chair Harper (Transfers) maximum assist (25% patient effort);2 person assist;verbal cues;nonverbal cues (demo/gesture)  -     Assistive Device (Bed-Chair Transfers) other (see comments)  BUE support  -     Comment, (Bed-Chair Transfer) Pt performed SPT from bed to recliner requiring max verbal/manual cues for proper weight shifting to help deweight LEs for advancement. Pt with considerable forward flexed posture throughout and decreased ability to accept RLE weight bearing and ability to position RLE.  -     Row Name 12/19/22 1156          Sit-Stand Transfer    Sit-Stand Harper (Transfers) moderate assist (50% patient effort);verbal cues;2 person assist  -     Assistive Device (Sit-Stand Transfers) walker, front-wheeled  -     Comment, (Sit-Stand Transfer) Pt required manual assist for proper positioning of feet for AYAAN. pt with significant forward flexed posture upon standing and required assist for positioning of R hand on RW handle.  -     Row Name 12/19/22 1156          Gait/Stairs (Locomotion)    Harper Level (Gait) unable to assess  -     Comment, (Gait/Stairs) Deferred d/t poor balance/safety.  -           User Key  (r) = Recorded By, (t) = Taken By, (c) = Cosigned By    Initials Name Provider Type     Gaetano Ricardo, PT Physical Therapist               Obj/Interventions     Row Name 12/19/22 1159          Motor Skills    Therapeutic Exercise hip;knee;ankle  -     Row Name 12/19/22 1159          Hip (Therapeutic Exercise)    Hip  (Therapeutic Exercise) AROM (active range of motion)  -     Hip AROM (Therapeutic Exercise) bilateral;flexion;extension;10 repetitions  -     Row Name 12/19/22 1159          Knee (Therapeutic Exercise)    Knee (Therapeutic Exercise) AROM (active range of motion)  -     Knee AROM (Therapeutic Exercise) bilateral;LAQ (long arc quad);heel slides;sitting;10 repetitions  -     Row Name 12/19/22 1159          Ankle (Therapeutic Exercise)    Ankle (Therapeutic Exercise) AROM (active range of motion)  -     Ankle AROM (Therapeutic Exercise) bilateral;dorsiflexion;plantarflexion;sitting;10 repetitions  -     Row Name 12/19/22 1159          Balance    Balance Assessment sitting static balance;sitting dynamic balance;standing static balance;standing dynamic balance  -     Static Sitting Balance contact guard  -     Dynamic Sitting Balance minimal assist  -     Position, Sitting Balance unsupported;sitting edge of bed  -     Static Standing Balance moderate assist;2-person assist  -     Dynamic Standing Balance maximum assist;2-person assist  -     Position/Device Used, Standing Balance supported;walker, front-wheeled;other (see comments)  BUE support for SPT  -     Comment, Balance Pt performed 4 reps of standing marches requiring physical assist for weight shifting and VCs for sequencing. pt with no knee buckling noted although poor balance throughout.  -           User Key  (r) = Recorded By, (t) = Taken By, (c) = Cosigned By    Initials Name Provider Type     Gaetano Ricardo, PT Physical Therapist               Goals/Plan    No documentation.                Clinical Impression     [facility] Name 12/19/22 1201          Pain    Pretreatment Pain Rating 0/10 - no pain  -     Posttreatment Pain Rating 0/10 - no pain  -     Row Name 12/19/22 1201          Plan of Care Review    Plan of Care Reviewed With patient  -     Progress improving  -     Outcome Evaluation Pt appears to be demonstrating  small improvements in RLE strength/mobility this session, although continues to require mod-maxA for mobility. Pt performed bed mobility with ModA, sit to stand transfers with ModA and RW, and stand pivot transfers from bed to recliner with MaxA and BUE support. PT recommending SNF at [place].  -     Row Name 12/19/22 1201          Vital Signs    Pre Systolic BP Rehab 167  -LH     Pre Treatment Diastolic BP 64  -     Pretreatment Heart Rate (beats/min) 75  -LH     Pre SpO2 (%) 100  -LH     O2 Delivery Pre Treatment room air  -LH     O2 Delivery Intra Treatment room air  -LH     Post SpO2 (%) 98  -LH     O2 Delivery Post Treatment room air  -LH     Pre Patient Position Supine  -     Intra Patient Position Standing  -     Post Patient Position Sitting  -     Row Name 12/19/22 1201          Positioning and Restraints    Pre-Treatment Position in bed  -LH     Post Treatment Position chair  -     In Chair reclined;call light within reach;encouraged to call for assist;exit alarm on;with family/caregiver;waffle cushion;on mechanical lift sling;legs elevated;heels elevated;compression device  -           User Key  (r) = Recorded By, (t) = Taken By, (c) = Cosigned By    Initials Name Provider Type     Gaetano Ricardo, PT Physical Therapist               Outcome Measures     Row Name 12/19/22 1201          How much help from another person do you currently need...    Turning from your back to your side while in flat bed without using bedrails? 2  -LH     Moving from lying on back to sitting on the side of a flat bed without bedrails? 2  -LH     Moving to and from a bed to a chair (including a wheelchair)? 2  -LH     Standing up from a chair using your arms (e.g., wheelchair, bedside chair)? 2  -LH     Climbing 3-5 steps with a railing? 1  -LH     To walk in hospital room? 1  -     AM-PAC 6 Clicks Score (PT) 10  -     Highest level of mobility 4 --> Transferred to chair/commode  -     Row Name 12/19/22 1209           Functional Assessment    Outcome Measure Options AM-PAC 6 Clicks Basic Mobility (PT)  -           User Key  (r) = Recorded By, (t) = Taken By, (c) = Cosigned By    Initials Name Provider Type     Gaetano Ricardo PT Physical Therapist                             Physical Therapy Education     Title: PT OT SLP Therapies (Done)     Topic: Physical Therapy (Done)     Point: Mobility training (Done)     Learning Progress Summary           Patient Acceptance, E, VU,NR by  at 12/19/2022 1203    Acceptance, E, VU by  at 12/17/2022 0650    Eager, E, VU,DU,NR by  at 12/16/2022 1528    Comment: Educated pt. safety/technique with bed mobility, transfers, PT POC   Family Acceptance, E, VU,NR by  at 12/19/2022 1203    Eager, E, VU,DU,NR by  at 12/16/2022 1528    Comment: Educated pt. safety/technique with bed mobility, transfers, PT POC                   Point: Home exercise program (Done)     Learning Progress Summary           Patient Acceptance, E, VU,NR by  at 12/19/2022 1203    Acceptance, E, VU by  at 12/17/2022 0650   Family Acceptance, E, VU,NR by  at 12/19/2022 1203                   Point: Body mechanics (Done)     Learning Progress Summary           Patient Acceptance, E, VU,NR by  at 12/19/2022 1203    Acceptance, E, VU by  at 12/17/2022 0650    Eager, E, VU,DU,NR by  at 12/16/2022 1528    Comment: Educated pt. safety/technique with bed mobility, transfers, PT POC   Family Acceptance, E, VU,NR by  at 12/19/2022 1203    Eager, E, VU,DU,NR by  at 12/16/2022 1528    Comment: Educated pt. safety/technique with bed mobility, transfers, PT POC                   Point: Precautions (Done)     Learning Progress Summary           Patient Acceptance, E, VU,NR by  at 12/19/2022 1203    Acceptance, E, VU by  at 12/17/2022 0650    Eager, E, VU,DU,NR by  at 12/16/2022 1528    Comment: Educated pt. safety/technique with bed mobility, transfers, PT POC   Family Acceptance, E, VU,NR by   at 12/19/2022 1203    TRISTIN Summers VU, DU,NR by  at 12/16/2022 1528    Comment: Educated pt. safety/technique with bed mobility, transfers, PT POC                               User Key     Initials Effective Dates Name Provider Type Discipline    LG 11/16/18 -  Wes Ellis RN Registered Nurse Nurse     09/21/21 -  Gaetano Ricardo, PT Physical Therapist PT     06/01/21 -  Araceli Ceballos, GEMMA Physical Therapist PT              PT Recommendation and Plan     Plan of Care Reviewed With: patient  Progress: improving  Outcome Evaluation: Pt appears to be demonstrating small improvements in RLE strength/mobility this session, although continues to require mod-maxA for mobility. Pt performed bed mobility with ModA, sit to stand transfers with ModA and RW, and stand pivot transfers from bed to recliner with MaxA and BUE support. PT recommending SNF at [place].     Time Calculation:    PT Charges     Row Name 12/19/22 1203             Time Calculation    Start Time 1114  -LH      PT Received On 12/19/22  -      PT Goal Re-Cert Due Date 12/26/22  -         Timed Charges    46470 - PT Therapeutic Exercise Minutes 7  -LH      00624 - PT Therapeutic Activity Minutes 22  -         Total Minutes    Timed Charges Total Minutes 29  -       Total Minutes 29  -LH            User Key  (r) = Recorded By, (t) = Taken By, (c) = Cosigned By    Initials Name Provider Type     Gaetano Ricardo, PT Physical Therapist              Therapy Charges for Today     Code Description Service Date Service Provider Modifiers Qty    33833777347 HC PT THER PROC EA 15 MIN 12/19/2022 Gaetano Ricardo, PT GP 1    83528854704 HC PT THERAPEUTIC ACT EA 15 MIN 12/19/2022 Gaetano Ricardo, PT GP 1          PT G-Codes  Outcome Measure Options: AM-PAC 6 Clicks Basic Mobility (PT)  AM-PAC 6 Clicks Score (PT): 10  Modified Culberson Scale: 4 - Moderately severe disability.  Unable to walk without assistance, and unable to attend to own bodily needs  without assistance.  PT Discharge Summary  Anticipated Discharge Disposition (PT): skilled nursing facility    Gaetano Ricardo, PT  12/19/2022

## 2022-12-19 NOTE — CASE MANAGEMENT/SOCIAL WORK
177.8 Discharge Planning Assessment  [facility]     Patient Name: Lu Hou  MRN: [id number]  Today's Date: 12/19/2022    Admit Date: 12/15/2022    Plan: Rehab   Discharge Needs Assessment     Row Name 12/19/22 1224       Living Environment    People in Home child(tia), adult    Current Living Arrangements apartment    Primary Care Provided by self    Provides Primary Care For no one, unable/limited ability to care for self    Family Caregiver if Needed child(tia), adult    Able to Return to Prior Arrangements yes       Transition Planning    Patient/Family Anticipates Transition to inpatient rehabilitation facility    Transportation Anticipated family or friend will provide       Discharge Needs Assessment    Readmission Within the Last 30 Days no previous admission in last 30 days    Equipment Currently Used at Home cane, straight;walker, rolling;commode    Current Discharge Risk dependent with mobility/activities of daily living               Discharge Plan     Row Name 12/19/22 1225       Plan    Plan Rehab    Patient/Family in Agreement with Plan yes    Plan Comments I met with Ms. Hou and her son, Darci, at the bedside. They live together in [facility]. Ms. Hou is independent with mobility and activities of daily living. She uses a rolling walker, bedside commode, and straight cane at home. She is driven by Will when leaving the home. She is not current with any home or outpatient services and denies any difficulty in obtaining or affording her medications. She does anticipate going to rehab at the time of discharge and would like referrals sent to [facility] and [facility]. I have sent these referrals out to the admissions liaisons with each facility and will await a bed offer. This will require insurance precertification and will take a day or two to come back from her insurance. Case management will continue to follow.    Final Discharge Disposition Code 03 - skilled nursing facility (SNF)               Continued Care and Services - Admitted Since 12/15/2022    Coordination has not been started for this encounter.       Expected Discharge Date and Time     Expected Discharge Date Expected Discharge Time    Dec 23, 2022          Demographic Summary     Row Name 12/19/22 1223       General Information    General Information Comments Confirmed PCP to have been Coco Ledezma (whom recently retired) and [facility] to be insurer.               Functional Status     Row Name 12/19/22 1223       Functional Status, IADL    Medications independent    Meal Preparation independent    Housekeeping assistive person    Laundry assistive person    Shopping assistive equipment and person       Employment/    Employment Status retired               Psychosocial    No documentation.                Abuse/Neglect    No documentation.                Legal    No documentation.                Substance Abuse    No documentation.                Patient Forms    No documentation.                   Ad Wilson RN

## 2022-12-20 VITALS
RESPIRATION RATE: 17 BRPM | HEART RATE: 83 BPM | OXYGEN SATURATION: 99 % | BODY MASS INDEX: 24.29 KG/M2 | HEIGHT: 62 IN | WEIGHT: 132 LBS | TEMPERATURE: 97.8 F | SYSTOLIC BLOOD PRESSURE: 154 MMHG | DIASTOLIC BLOOD PRESSURE: 68 MMHG

## 2022-12-20 PROCEDURE — 97110 THERAPEUTIC EXERCISES: CPT | Performed by: OCCUPATIONAL THERAPIST

## 2022-12-20 PROCEDURE — 99239 HOSP IP/OBS DSCHRG MGMT >30: CPT | Performed by: INTERNAL MEDICINE

## 2022-12-20 PROCEDURE — 97535 SELF CARE MNGMENT TRAINING: CPT | Performed by: OCCUPATIONAL THERAPIST

## 2022-12-20 RX ORDER — LISINOPRIL 10 MG/1
10 TABLET ORAL
Status: ON HOLD
Start: 2022-12-21 | End: 2023-01-25 | Stop reason: SDUPTHER

## 2022-12-20 RX ORDER — ASPIRIN 325 MG
325 TABLET, DELAYED RELEASE (ENTERIC COATED) ORAL DAILY
Status: ON HOLD
Start: 2022-12-21 | End: 2023-01-25 | Stop reason: SDUPTHER

## 2022-12-20 RX ORDER — ATORVASTATIN CALCIUM 40 MG/1
40 TABLET, FILM COATED ORAL NIGHTLY
Qty: 90 TABLET | Status: ON HOLD
Start: 2022-12-20 | End: 2023-01-25 | Stop reason: SDUPTHER

## 2022-12-20 RX ORDER — LIDOCAINE 50 MG/G
1 PATCH TOPICAL
Status: ON HOLD
Start: 2022-12-21 | End: 2023-01-25 | Stop reason: SDUPTHER

## 2022-12-20 RX ADMIN — LISINOPRIL 10 MG: 10 TABLET ORAL at 08:21

## 2022-12-20 RX ADMIN — LIDOCAINE 1 PATCH: 50 PATCH CUTANEOUS at 08:21

## 2022-12-20 RX ADMIN — ASPIRIN 325 MG: 325 TABLET, COATED ORAL at 08:21

## 2022-12-20 NOTE — DISCHARGE SUMMARY
[facility] Medicine Services  DISCHARGE SUMMARY    Patient Name: Lu Hou  : 1924  MRN: [id number]    Date of Admission: 12/15/2022  9:44 AM  Date of Discharge:  22  Primary Care Physician: Provider, No Known    Consults     Date and Time Order Name Status Description    12/15/2022  9:49 AM Inpatient Neurology Consult Stroke Completed           Hospital Course     Presenting Problem:   Right sided weakness [R53.1]    Active Hospital Problems    Diagnosis  POA   • **Right sided weakness [R53.1]  Yes   • Essential hypertension [I10]  Yes      Resolved Hospital Problems   No resolved problems to display.                Brief Hospital Course to date:  Lu Hou is a 89 y.o. female who  has a past medical history of hypertension. She woke in the morning after urinating int he bed. Her son was unable to get her up with her falling to the right. He noticed she had a right facial droop and she came to the ED. In the ED she was found to be very hypertensive. MRI brain showed an ischemic stroke.     L putamen ischemic stroke seen on MRI  - Patient seen by neurology Dr Mcclelland  - ECHO 66-70%  - hemoglobin A1c normal and FLP normal  - continue aspirin and high intensity statin  - PT/OT with inpatient rehab recommended  -- follow up in stroke clinic one month    HTN:    -- normal bp goals  -- started low dose lisinopril and titrate as needed      Decreased UOP  -- NS bolus given   -- s/p gentle IV fluids and encourage oral intake  -- output improved      Right shoulder pain  -- xray of shoulder showed no acute fracture and prior ORIF  -- lidoderm patch to shoulder             Discharge Follow Up Recommendations for outpatient labs/diagnostics:  Recommend weekly BMP and CBC while at rehab    Day of Discharge     HPI:   Feels fine today.  Still some weakness.  Eager for transfer to rehab    Review of Systems  Gen- No fevers, chills  CV- No chest pain,  palpitations  Resp- No cough, dyspnea  GI- No N/V/D, abd pain        Vital Signs:   Temp:  [97.8 °F (36.6 °C)-98.5 °F (36.9 °C)] 97.8 °F (36.6 °C)  Heart Rate:  [58-83] 83  Resp:  [17-18] 17  BP: (153-176)/(56-84) 154/68      Physical Exam:  Constitutional - no acute distress, frail, up in chair  HEENT-NCAT, mucous membranes moist  CV-RRR  Resp-CTAB  Abd-soft, nontender, nondistended, normoactive bowel sounds  Ext-No lower extremity cyanosis, clubbing or edema bilaterally  Neuro-alert, speech clear,  decreased on right hand, wiggles toes on both feet when requested  Psych-normal affect   Skin- No rash on exposed UE or LE bilaterally      Pertinent  and/or Most Recent Results     LAB RESULTS:      Lab 12/15/22  1000 12/15/22  0959 12/15/22  0956   WBC  --  5.45  --    HEMOGLOBIN  --  15.4  --    HEMOGLOBIN, POC 15.0  --   --    HEMATOCRIT  --  45.4  --    HEMATOCRIT POC 44  --   --    PLATELETS  --  230  --    NEUTROS ABS  --  3.18  --    IMMATURE GRANS (ABS)  --  0.02  --    LYMPHS ABS  --  1.45  --    MONOS ABS  --  0.55  --    EOS ABS  --  0.20  --    MCV  --  93.2  --    PROTIME  --   --  13.2   APTT  --  28.3  --          Lab 12/18/22  1109 12/16/22  0533 12/15/22  1000 12/15/22  0959   SODIUM 141  --   --  142   POTASSIUM 4.0  --   --  4.3   CHLORIDE 108*  --   --  105   CO2 23.0  --   --  24.0   ANION GAP 10.0  --   --  13.0   BUN 20  --   --  8   CREATININE 0.62  --  0.50* 0.55*   EGFR 80.6  --  84.9 83.0   GLUCOSE 151*  --   --  117*   CALCIUM 8.5  --   --  9.2   HEMOGLOBIN A1C  --  5.30  --   --          Lab 12/15/22  0959   TOTAL PROTEIN 6.8   ALBUMIN 4.30   GLOBULIN 2.5   ALT (SGPT) 17   AST (SGOT) 28   BILIRUBIN 0.7   ALK PHOS 65         Lab 12/15/22  1043 12/15/22  0956   TROPONIN T 0.013  --    PROTIME  --  13.2   INR  --  1.1         Lab 12/16/22  0533   CHOLESTEROL 136   LDL CHOL 44   HDL CHOL 80*   TRIGLYCERIDES 53             Brief Urine Lab Results  (Last result in the past 365 days)       Color   Clarity   Blood   Leuk Est   Nitrite   Protein   CREAT   Urine HCG        12/15/22 1059 Yellow   Clear   Trace   Negative   Negative   Negative               Microbiology Results (last 10 days)     Procedure Component Value - Date/Time    COVID-19 and FLU A/B PCR - Swab, Nasopharynx [[id number]]  (Normal) Collected: 12/15/22 1020    Lab Status: Final result Specimen: Swab from Nasopharynx Updated: 12/15/22 1102     COVID19 Not Detected     Influenza A PCR Not Detected     Influenza B PCR Not Detected    Narrative:      Fact sheet for providers: https://www.fda.gov/media/732999/download    Fact sheet for patients: https://www.fda.gov/media/971374/download    Test performed by PCR.          Adult Transthoracic Echo Complete W/ Cont if Necessary Per Protocol (With Agitated Saline)    Result Date: 12/16/2022  •  Left ventricular systolic function is normal. Left ventricular ejection fraction appears to be 66 - 70%. •  Left ventricular wall thickness is consistent with mild septal asymmetric hypertrophy. •  Left ventricular diastolic function is consistent with (grade Ia w/high LAP) impaired relaxation. •  Left atrial volume is mildly increased. •  Mild aortic valve regurgitation is present. •  Mild mitral annular calcification is present. There is mild calcification of the mitral valve anterior leaflet     XR Shoulder 2+ View Right    Result Date: 12/19/2022  DATE OF EXAM: 12/19/2022 8:45 AM  PROCEDURE: XR SHOULDER 2+ VW RIGHT-  INDICATIONS: fall; R53.1-Weakness; I16.0-Hypertensive urgency; R47.1-Dysarthria and anarthria; R13.10-Dysphagia, unspecified  COMPARISON: No comparisons available.  TECHNIQUE: A minimum of two radiologic views of the right shoulder were obtained.  FINDINGS: Patient is status post ORIF of the proximal right humerus. Intramedullary tammi extending from the humeral head noted appears intact. Locking and interfragmentary screws appear intact. No acute fracture or dislocation is noted. Moderate  degenerative changes at the glenohumeral joint and mild degenerative changes at the AC joint are noted. Subtle findings are limited by diffuse osteopenia. Limited imaging of the chest shows no acute abnormality.      Prior ORIF proximal right humerus  Degenerative changes noted of the shoulder  No definite acute osseous abnormality given limitations from osteopenia  This report was finalized on 12/19/2022 9:43 AM by Primitivo Davidson.      CT Angiogram Neck    Result Date: 12/15/2022  DATE OF EXAM: 12/15/2022 9:51 AM  PROCEDURE: CT ANGIOGRAM NECK-, CT ANGIOGRAM HEAD W AI ANALYSIS OF LVO-  INDICATIONS: stroke  COMPARISON: No comparisons available.  TECHNIQUE: CTA of the head and CTA of the neck was performed after the intravenous administration of 115 mL of Isovue 370. Reconstructed coronal and sagittal images were also obtained. In addition, a 3-D volume rendered image was obtained after post processing. Automated exposure control and iterative reconstruction methods were used. AI analysis of LVO was utilized for the CTA Head imaging portion of the study.  The radiation dose reduction device was turned on for each scan per the ALARA (As Low as Reasonably Achievable) protocol.  Stenosis measurement was performed by the NASCET or similar method.  FINDINGS: CTA NECK: Included subclavian arteries are tortuous, but normal in caliber.  On the right, no hemodynamically significant common carotid stenosis is seen. There is mild calcified plaque of the ICA origin, but no evidence of hemodynamically significant right ICA or ECA stenosis.  On the left, common, internal and external carotid arteries are normal in caliber. There is trace external carotid artery origin calcification and no evidence of significant atherosclerotic change elsewhere. Sagittal reconstructions appear to show mild smooth narrowing of the distal common carotid and ICA origin, but no plaque is apparent here on the axial thin section images, and again, no  evidence of significant stenosis.  There is probably moderate right vertebral artery origin stenosis, images 77 through 81. Right vertebral artery is nondominant but no other focal narrowing is seen. Left vertebral artery is relatively large, normal in caliber along its length. No significant incidental disease is seen of the included lung apices, superior mediastinum, or neck soft tissues.      No evidence of hemodynamically significant carotid or left vertebral artery stenosis in the neck. Suspected moderate origin stenosis of the nondominant right vertebral artery.    CTA HEAD: Nondominant, but otherwise normal-appearing right vertebral artery is seen with no focal stenosis. Large left vertebral artery appears normal. Basilar artery appears normal. Posterior cerebral arteries appear normal except for mild irregularity of the distal left posterior cerebral artery, which appears to show mild atherosclerotic disease. There also appears to be a moderate-sized left posterior communicating artery in addition to normal appearing left P1 segment.  There is relatively mild calcification of the cavernous carotid arteries and no evidence of hemodynamically significant carotid stenosis. Anterior and middle cerebral arteries and proximal branches are normal in caliber. There appears to be significant distal M2 level stenosis on the right, axial MIP image 18 series 901, rapid MIP image nine series 904, and milder distal disease. There is a somewhat larger right middle cerebral artery than left, and what appears to be relatively mild distal left M1 stenosis, best seen on axial MIP image 23 series 901, rapid MIP image 10 series 904. No distal branch stenosis is appreciated.  IMPRESSION:  1. Relatively mild atherosclerotic changes of the distal left posterior cerebral artery, and distal left M1 segment. 2. Potentially high-grade right M2 focal stenosis, difficult to characterize by NASCET criteria. Milder distal right M2/M3  atherosclerotic change.  This report was finalized on 12/15/2022 11:20 AM by Dr. Andrea Pfeiffer MD.      MRI Brain Without Contrast    Result Date: 12/16/2022  DATE OF EXAM: 12/16/2022 2:32 AM  PROCEDURE: MRI BRAIN WO CONTRAST-  INDICATIONS: Stroke, follow up  COMPARISON: No comparisons available.  TECHNIQUE: Multiplanar multisequence images of the brain were performed without contrast according to routine brain MRI protocol.  FINDINGS: Small areas of acute infarct are present within the left putamen. There is no evidence of associated hemorrhage or significant mass effect. Age-related changes are present with generalized volume loss. There is ex vacuo prominence of the lateral ventricles. The orbits are normal and the paranasal sinuses are grossly clear.      Small area of acute infarct present within the left putamen, without associated hemorrhage or significant mass effect.  Age-related changes are otherwise present as above.  This report was finalized on 12/16/2022 8:48 AM by Tony Mazariegos.      FL Video Swallow With Speech Single Contrast    Result Date: 12/19/2022  EXAMINATION: FL VIDEO SWALLOW W SPEECH SINGLE-CONTRAST-  INDICATION: dysphagia; R53.1-Weakness; I16.0-Hypertensive urgency; R47.1-Dysarthria and anarthria; R13.10-Dysphagia, unspecified  TECHNIQUE: 1 minute and 24 seconds of fluoroscopic time was used for this exam. 9 associated fluoroscopic loops were saved. The patient was evaluated in the seated lateral position while taking a variety of consistencies of barium by mouth under the direction of speech pathology.  COMPARISON: NONE  FINDINGS: 1 minute and 24 seconds of fluoroscopy provided for a modified barium swallow. Please see speech therapy report for full details and recommendations.       Fluoroscopy provided for a modified barium swallow. Please see speech therapy report for full details and recommendations.    This report was finalized on 12/19/2022 11:10 PM by Tony Mazariegos.      XR Chest  1 View    Result Date: 12/15/2022  DATE OF EXAM: 12/15/2022 10:46 AM  PROCEDURE: XR CHEST 1 VW-  INDICATIONS: Acute Stroke Protocol (onset < 12 hrs)  COMPARISON: No comparisons available.  TECHNIQUE: Single radiographic AP view of the chest was obtained.  FINDINGS: Mild chronic changes of the lung fields are present without focal airspace opacity. There is no significant pleural effusion or distinct pneumothorax. Normal heart and mediastinal contours. Right humerus fixation noted.      Mild chronic interstitial and fibrotic changes of the lung fields without evidence of acute cardiopulmonary abnormality.  This report was finalized on 12/15/2022 11:16 AM by Tony Mazariegos.      CT Head Without Contrast Stroke Protocol    Result Date: 12/15/2022  DATE OF EXAM: 12/15/2022 9:47 AM  PROCEDURE: CT HEAD WO CONTRAST STROKE PROTOCOL-  INDICATIONS: STROKE  COMPARISON: No comparisons available.  TECHNIQUE: Routine transaxial and coronal reconstruction images were obtained through the head without the administration of contrast. Automated exposure control and iterative reconstruction methods were used.  The radiation dose reduction device was turned on for each scan per the ALARA (As Low as Reasonably Achievable) protocol.  FINDINGS: Gray-white differentiation is maintained and there is no evidence of intracranial hemorrhage, mass or mass effect. Age-related changes of the brain are present including volume loss and typical periventricular sequela of chronic small vessel ischemia. There is otherwise no evidence of intracranial hemorrhage, mass or mass effect. The ventricles are normal in size and configuration accounting for surrounding volume loss. The orbits are normal and the paranasal sinuses are grossly clear.      Age-related changes of the brain as above, otherwise without evidence of acute intracranial abnormality.   Scan performed 9:46 AM 12/15/2022. Results discussed with the stroke team by Tony Mazariegos in  person 9:49 AM same day  This report was finalized on 12/15/2022 11:17 AM by Tony Mazariegos.      CT Angiogram Head w AI Analysis of LVO    Result Date: 12/15/2022  DATE OF EXAM: 12/15/2022 9:51 AM  PROCEDURE: CT ANGIOGRAM NECK-, CT ANGIOGRAM HEAD W AI ANALYSIS OF LVO-  INDICATIONS: stroke  COMPARISON: No comparisons available.  TECHNIQUE: CTA of the head and CTA of the neck was performed after the intravenous administration of 115 mL of Isovue 370. Reconstructed coronal and sagittal images were also obtained. In addition, a 3-D volume rendered image was obtained after post processing. Automated exposure control and iterative reconstruction methods were used. AI analysis of LVO was utilized for the CTA Head imaging portion of the study.  The radiation dose reduction device was turned on for each scan per the ALARA (As Low as Reasonably Achievable) protocol.  Stenosis measurement was performed by the NASCET or similar method.  FINDINGS: CTA NECK: Included subclavian arteries are tortuous, but normal in caliber.  On the right, no hemodynamically significant common carotid stenosis is seen. There is mild calcified plaque of the ICA origin, but no evidence of hemodynamically significant right ICA or ECA stenosis.  On the left, common, internal and external carotid arteries are normal in caliber. There is trace external carotid artery origin calcification and no evidence of significant atherosclerotic change elsewhere. Sagittal reconstructions appear to show mild smooth narrowing of the distal common carotid and ICA origin, but no plaque is apparent here on the axial thin section images, and again, no evidence of significant stenosis.  There is probably moderate right vertebral artery origin stenosis, images 77 through 81. Right vertebral artery is nondominant but no other focal narrowing is seen. Left vertebral artery is relatively large, normal in caliber along its length. No significant incidental disease is seen of  the included lung apices, superior mediastinum, or neck soft tissues.      No evidence of hemodynamically significant carotid or left vertebral artery stenosis in the neck. Suspected moderate origin stenosis of the nondominant right vertebral artery.    CTA HEAD: Nondominant, but otherwise normal-appearing right vertebral artery is seen with no focal stenosis. Large left vertebral artery appears normal. Basilar artery appears normal. Posterior cerebral arteries appear normal except for mild irregularity of the distal left posterior cerebral artery, which appears to show mild atherosclerotic disease. There also appears to be a moderate-sized left posterior communicating artery in addition to normal appearing left P1 segment.  There is relatively mild calcification of the cavernous carotid arteries and no evidence of hemodynamically significant carotid stenosis. Anterior and middle cerebral arteries and proximal branches are normal in caliber. There appears to be significant distal M2 level stenosis on the right, axial MIP image 18 series 901, rapid MIP image nine series 904, and milder distal disease. There is a somewhat larger right middle cerebral artery than left, and what appears to be relatively mild distal left M1 stenosis, best seen on axial MIP image 23 series 901, rapid MIP image 10 series 904. No distal branch stenosis is appreciated.  IMPRESSION:  1. Relatively mild atherosclerotic changes of the distal left posterior cerebral artery, and distal left M1 segment. 2. Potentially high-grade right M2 focal stenosis, difficult to characterize by NASCET criteria. Milder distal right M2/M3 atherosclerotic change.  This report was finalized on 12/15/2022 11:20 AM by Dr. Andrea Pfeiffer MD.      CT CEREBRAL PERFUSION WITH & WITHOUT CONTRAST    Result Date: 12/15/2022  DATE OF EXAM: 12/15/2022 9:51 AM  PROCEDURE: CT CEREBRAL PERFUSION W WO CONTRAST-  INDICATIONS: stroke  COMPARISON: Head CT scan of same date  TECHNIQUE:  Routine transaxial cuts were obtained through the head without administration of contrast. Routine transaxial cuts were then obtained through the head following the intravenous administration of 115 mL of Isovue 300. Core blood volume, core blood flow, mean transit time, and Tmax images were obtained utilizing the Rapid software protocol. A limited CT angiogram of the head was also performed to measure the blood vessel density.  The radiation dose reduction device was turned on for each scan per the ALARA (As Low as Reasonably Achievable) protocol.  FINDINGS: Rapid analysis is negative with no areas the brain showing cerebral blood flow less than 30% or T-Max greater than 6 seconds. Individual perfusion maps show no consistent focal asymmetry to suggest focal ischemia or infarct.      Negative perfusion scan.  This report was finalized on 12/15/2022 11:04 AM by Dr. Andrea Pfeiffer MD.                Results for orders placed during the hospital encounter of 12/15/22    Adult Transthoracic Echo Complete W/ Cont if Necessary Per Protocol (With Agitated Saline)    Interpretation Summary  •  Left ventricular systolic function is normal. Left ventricular ejection fraction appears to be 66 - 70%.  •  Left ventricular wall thickness is consistent with mild septal asymmetric hypertrophy.  •  Left ventricular diastolic function is consistent with (grade Ia w/high LAP) impaired relaxation.  •  Left atrial volume is mildly increased.  •  Mild aortic valve regurgitation is present.  •  Mild mitral annular calcification is present. There is mild calcification of the mitral valve anterior leaflet      Plan for Follow-up of Pending Labs/Results:     Discharge Details        Discharge Medications      New Medications      Instructions Start Date   aspirin  MG tablet   325 mg, Oral, Daily   Start Date: December 21, 2022     atorvastatin 40 MG tablet  Commonly known as: LIPITOR   40 mg, Oral, Nightly      lidocaine 5 %  Commonly  known as: LIDODERM   1 patch, Transdermal, Every 24 Hours Scheduled, Remove & Discard patch within 12 hours or as directed by MD   Start Date: December 21, 2022     lisinopril 10 MG tablet  Commonly known as: PRINIVIL,ZESTRIL   10 mg, Oral, Every 24 Hours Scheduled   Start Date: December 21, 2022        Continue These Medications      Instructions Start Date   Garlic 100 MG tablet   100 mg, Oral, Daily      multivitamin with minerals tablet tablet   1 tablet, Oral, 2 Times Daily             No Known Allergies      Discharge Disposition:  Rehab Facility or Unit (DC - External)    Diet:  Hospital:  Diet Order   Procedures   • Diet: No Mixed Consistencies, No Straw; Texture: Soft to Chew (NDD 3); Soft to Chew: Chopped Meat; Fluid Consistency: Thin (IDDSI 0)       Activity:      Restrictions or Other Recommendations:         CODE STATUS:    Code Status and Medical Interventions:   Ordered at: 12/19/22 1051     Level Of Support Discussed With:    Patient     Code Status (Patient has no pulse and is not breathing):    CPR (Attempt to Resuscitate)     Medical Interventions (Patient has pulse or is breathing):    Full Support     Release to patient:    Routine Release       No future appointments.    Additional Instructions for the Follow-ups that You Need to Schedule     Discharge Follow-up with Specialty: follow up with Stroke Neurology in one month   As directed      Specialty: follow up with Stroke Neurology in one month                     Calvin Zee MD  12/20/22      Time Spent on Discharge:  I spent  40  minutes on this discharge activity which included: face-to-face encounter with the patient, reviewing the data in the system, coordination of the care with the nursing staff as well as consultants, documentation, and entering orders.

## 2022-12-20 NOTE — PLAN OF CARE
Goal Outcome Evaluation:  Plan of Care Reviewed With: patient        Progress: no change  Outcome Evaluation: Pt alert, Ox4 and intermittent confusion noted. She completed bed mobility with max assist x2, UB dressing with max assist, grooming with max assist and transfer to chair with max assist x2. She is limited with decreased AROM/FMC/strength RUE, impaired balance, blurred vision and intermittent confusion. Recommend SNF.

## 2022-12-20 NOTE — CASE MANAGEMENT/SOCIAL WORK
Case Management Discharge Note      Final Note: Spoke with Sandra at [facility].  Insurance precert has been approved and a bed is available today.  RN to call report to [phone] and fax DC summary to [phone].  No Covid test required.  [facility] stretcher van to transport today at 1700.  Son updated/agreeable via phone.         Selected Continued Care - Admitted Since 12/15/2022     Destination Coordination complete.    Service Provider Selected Services Address Phone Fax Patient Preferred    [facility] [address] [place] [id number] [phone] --          Durable Medical Equipment    No services have been selected for the patient.              Dialysis/Infusion    No services have been selected for the patient.              Home Medical Care    No services have been selected for the patient.              Therapy    No services have been selected for the patient.              Community Resources    No services have been selected for the patient.              Community & DME    No services have been selected for the patient.                  Transportation Services  Other:  ([facility]retcher)    Final Discharge Disposition Code: 03 - skilled nursing facility (SNF)

## 2022-12-20 NOTE — PLAN OF CARE
Goal Outcome Evaluation:  Plan of Care Reviewed With: patient        Progress: no change   Pt is alert and oriented X 3. VSS. RA. Voiding spontaneously. NIH 6.

## 2022-12-20 NOTE — DISCHARGE PLACEMENT REQUEST
"Aileen Palumbo, RN    [phone]                  Enma Hou (89 y.o. Female)     Date of Birth   1924    Social Security Number       Address   [address] [address]    Home Phone   [phone]    MRN   [id number]       Mormonism   None    Marital Status                               Admission Date   12/15/22    Admission Type   Emergency    Admitting Provider   Calvin Zee MD    Attending Provider   Calvin Zee MD    Department, Room/Bed   [facility] 3G, S363/1       Discharge Date       Discharge Disposition   Rehab Facility or Unit (DC - External)    Discharge Destination                               Attending Provider: Calvin Zee MD    Allergies: No Known Allergies    Isolation: None   Infection: None   Code Status: CPR    Ht: 157.5 cm (62\")   Wt: 59.9 kg (132 lb)    Admission Cmt: None   Principal Problem: Right sided weakness [R53.1]                 Active Insurance as of 12/15/2022     Primary Coverage     Payor Plan Insurance Group Employer/Plan Group    [facility] MEDICARE REPLACEMENT [facility] MEDICARE REPLACEMENT [id number]     Payor Plan Address Payor Plan Phone Number Payor Plan Fax Number Effective Dates    [address] [address]   2022 - None Entered    [facility] [id number]       Subscriber Name Subscriber Birth Date Member ID       ENMA HOU 1924 [id number]                 Emergency Contacts      (Rel.) Home Phone Work Phone Mobile Phone    MELLISA HOU (Son) [phone] -- [phone]               Discharge Summary      Calvin Zee MD at 22 1330              [facility]  DISCHARGE SUMMARY    Patient Name: Enma Hou  : 1924  MRN: [id number]    Date of Admission: 12/15/2022  9:44 AM  Date of Discharge:  22  Primary Care Physician: Provider, No Known    Consults     Date and Time Order Name Status " Description    12/15/2022  9:49 AM Inpatient Neurology Consult Stroke Completed           Hospital Course     Presenting Problem:   Right sided weakness [R53.1]    Active Hospital Problems    Diagnosis  POA   • **Right sided weakness [R53.1]  Yes   • Essential hypertension [I10]  Yes      Resolved Hospital Problems   No resolved problems to display.                Brief Hospital Course to date:  Lu Hou is a 89 y.o. female who  has a past medical history of hypertension. She woke in the morning after urinating int he bed. Her son was unable to get her up with her falling to the right. He noticed she had a right facial droop and she came to the ED. In the ED she was found to be very hypertensive. MRI brain showed an ischemic stroke.     L putamen ischemic stroke seen on MRI  - Patient seen by neurology Dr Mcclelland  - ECHO 66-70%  - hemoglobin A1c normal and FLP normal  - continue aspirin and high intensity statin  - PT/OT with inpatient rehab recommended  -- follow up in stroke clinic one month    HTN:    -- normal bp goals  -- started low dose lisinopril and titrate as needed      Decreased UOP  -- NS bolus given 12/18  -- s/p gentle IV fluids and encourage oral intake  -- output improved      Right shoulder pain  -- xray of shoulder showed no acute fracture and prior ORIF  -- lidoderm patch to shoulder             Discharge Follow Up Recommendations for outpatient labs/diagnostics:  Recommend weekly BMP and CBC while at rehab    Day of Discharge     HPI:   Feels fine today.  Still some weakness.  Eager for transfer to rehab    Review of Systems  Gen- No fevers, chills  CV- No chest pain, palpitations  Resp- No cough, dyspnea  GI- No N/V/D, abd pain        Vital Signs:   Temp:  [97.8 °F (36.6 °C)-98.5 °F (36.9 °C)] 97.8 °F (36.6 °C)  Heart Rate:  [58-83] 83  Resp:  [17-18] 17  BP: (153-176)/(56-84) 154/68      Physical Exam:  Constitutional - no acute distress, frail, up in chair  HEENT-NCAT, mucous  membranes moist  CV-RRR  Resp-CTAB  Abd-soft, nontender, nondistended, normoactive bowel sounds  Ext-No lower extremity cyanosis, clubbing or edema bilaterally  Neuro-alert, speech clear,  decreased on right hand, wiggles toes on both feet when requested  Psych-normal affect   Skin- No rash on exposed UE or LE bilaterally      Pertinent  and/or Most Recent Results     LAB RESULTS:      Lab 12/15/22  1000 12/15/22  0959 12/15/22  0956   WBC  --  5.45  --    HEMOGLOBIN  --  15.4  --    HEMOGLOBIN, POC 15.0  --   --    HEMATOCRIT  --  45.4  --    HEMATOCRIT POC 44  --   --    PLATELETS  --  230  --    NEUTROS ABS  --  3.18  --    IMMATURE GRANS (ABS)  --  0.02  --    LYMPHS ABS  --  1.45  --    MONOS ABS  --  0.55  --    EOS ABS  --  0.20  --    MCV  --  93.2  --    PROTIME  --   --  13.2   APTT  --  28.3  --          Lab 12/18/22  1109 12/16/22  0533 12/15/22  1000 12/15/22  0959   SODIUM 141  --   --  142   POTASSIUM 4.0  --   --  4.3   CHLORIDE 108*  --   --  105   CO2 23.0  --   --  24.0   ANION GAP 10.0  --   --  13.0   BUN 20  --   --  8   CREATININE 0.62  --  0.50* 0.55*   EGFR 80.6  --  84.9 83.0   GLUCOSE 151*  --   --  117*   CALCIUM 8.5  --   --  9.2   HEMOGLOBIN A1C  --  5.30  --   --          Lab 12/15/22  0959   TOTAL PROTEIN 6.8   ALBUMIN 4.30   GLOBULIN 2.5   ALT (SGPT) 17   AST (SGOT) 28   BILIRUBIN 0.7   ALK PHOS 65         Lab 12/15/22  1043 12/15/22  0956   TROPONIN T 0.013  --    PROTIME  --  13.2   INR  --  1.1         Lab 12/16/22  0533   CHOLESTEROL 136   LDL CHOL 44   HDL CHOL 80*   TRIGLYCERIDES 53             Brief Urine Lab Results  (Last result in the past 365 days)      Color   Clarity   Blood   Leuk Est   Nitrite   Protein   CREAT   Urine HCG        12/15/22 1059 Yellow   Clear   Trace   Negative   Negative   Negative               Microbiology Results (last 10 days)     Procedure Component Value - Date/Time    COVID-19 and FLU A/B PCR - Swab, Nasopharynx [[id number]]  (Normal)  Collected: 12/15/22 1020    Lab Status: Final result Specimen: Swab from Nasopharynx Updated: 12/15/22 1102     COVID19 Not Detected     Influenza A PCR Not Detected     Influenza B PCR Not Detected    Narrative:      Fact sheet for providers: https://www.fda.gov/media/088310/download    Fact sheet for patients: https://www.fda.gov/media/955307/download    Test performed by PCR.          Adult Transthoracic Echo Complete W/ Cont if Necessary Per Protocol (With Agitated Saline)    Result Date: 12/16/2022  •  Left ventricular systolic function is normal. Left ventricular ejection fraction appears to be 66 - 70%. •  Left ventricular wall thickness is consistent with mild septal asymmetric hypertrophy. •  Left ventricular diastolic function is consistent with (grade Ia w/high LAP) impaired relaxation. •  Left atrial volume is mildly increased. •  Mild aortic valve regurgitation is present. •  Mild mitral annular calcification is present. There is mild calcification of the mitral valve anterior leaflet     XR Shoulder 2+ View Right    Result Date: 12/19/2022  DATE OF EXAM: 12/19/2022 8:45 AM  PROCEDURE: XR SHOULDER 2+ VW RIGHT-  INDICATIONS: fall; R53.1-Weakness; I16.0-Hypertensive urgency; R47.1-Dysarthria and anarthria; R13.10-Dysphagia, unspecified  COMPARISON: No comparisons available.  TECHNIQUE: A minimum of two radiologic views of the right shoulder were obtained.  FINDINGS: Patient is status post ORIF of the proximal right humerus. Intramedullary tammi extending from the humeral head noted appears intact. Locking and interfragmentary screws appear intact. No acute fracture or dislocation is noted. Moderate degenerative changes at the glenohumeral joint and mild degenerative changes at the AC joint are noted. Subtle findings are limited by diffuse osteopenia. Limited imaging of the chest shows no acute abnormality.      Prior ORIF proximal right humerus  Degenerative changes noted of the shoulder  No definite acute  osseous abnormality given limitations from osteopenia  This report was finalized on 12/19/2022 9:43 AM by Primitivo Davidson.      CT Angiogram Neck    Result Date: 12/15/2022  DATE OF EXAM: 12/15/2022 9:51 AM  PROCEDURE: CT ANGIOGRAM NECK-, CT ANGIOGRAM HEAD W AI ANALYSIS OF LVO-  INDICATIONS: stroke  COMPARISON: No comparisons available.  TECHNIQUE: CTA of the head and CTA of the neck was performed after the intravenous administration of 115 mL of Isovue 370. Reconstructed coronal and sagittal images were also obtained. In addition, a 3-D volume rendered image was obtained after post processing. Automated exposure control and iterative reconstruction methods were used. AI analysis of LVO was utilized for the CTA Head imaging portion of the study.  The radiation dose reduction device was turned on for each scan per the ALARA (As Low as Reasonably Achievable) protocol.  Stenosis measurement was performed by the NASCET or similar method.  FINDINGS: CTA NECK: Included subclavian arteries are tortuous, but normal in caliber.  On the right, no hemodynamically significant common carotid stenosis is seen. There is mild calcified plaque of the ICA origin, but no evidence of hemodynamically significant right ICA or ECA stenosis.  On the left, common, internal and external carotid arteries are normal in caliber. There is trace external carotid artery origin calcification and no evidence of significant atherosclerotic change elsewhere. Sagittal reconstructions appear to show mild smooth narrowing of the distal common carotid and ICA origin, but no plaque is apparent here on the axial thin section images, and again, no evidence of significant stenosis.  There is probably moderate right vertebral artery origin stenosis, images 77 through 81. Right vertebral artery is nondominant but no other focal narrowing is seen. Left vertebral artery is relatively large, normal in caliber along its length. No significant incidental disease is  seen of the included lung apices, superior mediastinum, or neck soft tissues.      No evidence of hemodynamically significant carotid or left vertebral artery stenosis in the neck. Suspected moderate origin stenosis of the nondominant right vertebral artery.    CTA HEAD: Nondominant, but otherwise normal-appearing right vertebral artery is seen with no focal stenosis. Large left vertebral artery appears normal. Basilar artery appears normal. Posterior cerebral arteries appear normal except for mild irregularity of the distal left posterior cerebral artery, which appears to show mild atherosclerotic disease. There also appears to be a moderate-sized left posterior communicating artery in addition to normal appearing left P1 segment.  There is relatively mild calcification of the cavernous carotid arteries and no evidence of hemodynamically significant carotid stenosis. Anterior and middle cerebral arteries and proximal branches are normal in caliber. There appears to be significant distal M2 level stenosis on the right, axial MIP image 18 series 901, rapid MIP image nine series 904, and milder distal disease. There is a somewhat larger right middle cerebral artery than left, and what appears to be relatively mild distal left M1 stenosis, best seen on axial MIP image 23 series 901, rapid MIP image 10 series 904. No distal branch stenosis is appreciated.  IMPRESSION:  1. Relatively mild atherosclerotic changes of the distal left posterior cerebral artery, and distal left M1 segment. 2. Potentially high-grade right M2 focal stenosis, difficult to characterize by NASCET criteria. Milder distal right M2/M3 atherosclerotic change.  This report was finalized on 12/15/2022 11:20 AM by Dr. Andrea Pfeiffer MD.      MRI Brain Without Contrast    Result Date: 12/16/2022  DATE OF EXAM: 12/16/2022 2:32 AM  PROCEDURE: MRI BRAIN WO CONTRAST-  INDICATIONS: Stroke, follow up  COMPARISON: No comparisons available.  TECHNIQUE: Multiplanar  multisequence images of the brain were performed without contrast according to routine brain MRI protocol.  FINDINGS: Small areas of acute infarct are present within the left putamen. There is no evidence of associated hemorrhage or significant mass effect. Age-related changes are present with generalized volume loss. There is ex vacuo prominence of the lateral ventricles. The orbits are normal and the paranasal sinuses are grossly clear.      Small area of acute infarct present within the left putamen, without associated hemorrhage or significant mass effect.  Age-related changes are otherwise present as above.  This report was finalized on 12/16/2022 8:48 AM by Tony Mazariegos.      FL Video Swallow With Speech Single Contrast    Result Date: 12/19/2022  EXAMINATION: FL VIDEO SWALLOW W SPEECH SINGLE-CONTRAST-  INDICATION: dysphagia; R53.1-Weakness; I16.0-Hypertensive urgency; R47.1-Dysarthria and anarthria; R13.10-Dysphagia, unspecified  TECHNIQUE: 1 minute and 24 seconds of fluoroscopic time was used for this exam. 9 associated fluoroscopic loops were saved. The patient was evaluated in the seated lateral position while taking a variety of consistencies of barium by mouth under the direction of speech pathology.  COMPARISON: NONE  FINDINGS: 1 minute and 24 seconds of fluoroscopy provided for a modified barium swallow. Please see speech therapy report for full details and recommendations.       Fluoroscopy provided for a modified barium swallow. Please see speech therapy report for full details and recommendations.    This report was finalized on 12/19/2022 11:10 PM by Tony Mazariegos.      XR Chest 1 View    Result Date: 12/15/2022  DATE OF EXAM: 12/15/2022 10:46 AM  PROCEDURE: XR CHEST 1 VW-  INDICATIONS: Acute Stroke Protocol (onset < 12 hrs)  COMPARISON: No comparisons available.  TECHNIQUE: Single radiographic AP view of the chest was obtained.  FINDINGS: Mild chronic changes of the lung fields are present  without focal airspace opacity. There is no significant pleural effusion or distinct pneumothorax. Normal heart and mediastinal contours. Right humerus fixation noted.      Mild chronic interstitial and fibrotic changes of the lung fields without evidence of acute cardiopulmonary abnormality.  This report was finalized on 12/15/2022 11:16 AM by Tony Mazariegos.      CT Head Without Contrast Stroke Protocol    Result Date: 12/15/2022  DATE OF EXAM: 12/15/2022 9:47 AM  PROCEDURE: CT HEAD WO CONTRAST STROKE PROTOCOL-  INDICATIONS: STROKE  COMPARISON: No comparisons available.  TECHNIQUE: Routine transaxial and coronal reconstruction images were obtained through the head without the administration of contrast. Automated exposure control and iterative reconstruction methods were used.  The radiation dose reduction device was turned on for each scan per the ALARA (As Low as Reasonably Achievable) protocol.  FINDINGS: Gray-white differentiation is maintained and there is no evidence of intracranial hemorrhage, mass or mass effect. Age-related changes of the brain are present including volume loss and typical periventricular sequela of chronic small vessel ischemia. There is otherwise no evidence of intracranial hemorrhage, mass or mass effect. The ventricles are normal in size and configuration accounting for surrounding volume loss. The orbits are normal and the paranasal sinuses are grossly clear.      Age-related changes of the brain as above, otherwise without evidence of acute intracranial abnormality.   Scan performed 9:46 AM 12/15/2022. Results discussed with the stroke team by Tony Mazariegos in person 9:49 AM same day  This report was finalized on 12/15/2022 11:17 AM by Tony Mazariegos.      CT Angiogram Head w AI Analysis of LVO    Result Date: 12/15/2022  DATE OF EXAM: 12/15/2022 9:51 AM  PROCEDURE: CT ANGIOGRAM NECK-, CT ANGIOGRAM HEAD W AI ANALYSIS OF LVO-  INDICATIONS: stroke  COMPARISON: No comparisons  available.  TECHNIQUE: CTA of the head and CTA of the neck was performed after the intravenous administration of 115 mL of Isovue 370. Reconstructed coronal and sagittal images were also obtained. In addition, a 3-D volume rendered image was obtained after post processing. Automated exposure control and iterative reconstruction methods were used. AI analysis of LVO was utilized for the CTA Head imaging portion of the study.  The radiation dose reduction device was turned on for each scan per the ALARA (As Low as Reasonably Achievable) protocol.  Stenosis measurement was performed by the NASCET or similar method.  FINDINGS: CTA NECK: Included subclavian arteries are tortuous, but normal in caliber.  On the right, no hemodynamically significant common carotid stenosis is seen. There is mild calcified plaque of the ICA origin, but no evidence of hemodynamically significant right ICA or ECA stenosis.  On the left, common, internal and external carotid arteries are normal in caliber. There is trace external carotid artery origin calcification and no evidence of significant atherosclerotic change elsewhere. Sagittal reconstructions appear to show mild smooth narrowing of the distal common carotid and ICA origin, but no plaque is apparent here on the axial thin section images, and again, no evidence of significant stenosis.  There is probably moderate right vertebral artery origin stenosis, images 77 through 81. Right vertebral artery is nondominant but no other focal narrowing is seen. Left vertebral artery is relatively large, normal in caliber along its length. No significant incidental disease is seen of the included lung apices, superior mediastinum, or neck soft tissues.      No evidence of hemodynamically significant carotid or left vertebral artery stenosis in the neck. Suspected moderate origin stenosis of the nondominant right vertebral artery.    CTA HEAD: Nondominant, but otherwise normal-appearing right  vertebral artery is seen with no focal stenosis. Large left vertebral artery appears normal. Basilar artery appears normal. Posterior cerebral arteries appear normal except for mild irregularity of the distal left posterior cerebral artery, which appears to show mild atherosclerotic disease. There also appears to be a moderate-sized left posterior communicating artery in addition to normal appearing left P1 segment.  There is relatively mild calcification of the cavernous carotid arteries and no evidence of hemodynamically significant carotid stenosis. Anterior and middle cerebral arteries and proximal branches are normal in caliber. There appears to be significant distal M2 level stenosis on the right, axial MIP image 18 series 901, rapid MIP image nine series 904, and milder distal disease. There is a somewhat larger right middle cerebral artery than left, and what appears to be relatively mild distal left M1 stenosis, best seen on axial MIP image 23 series 901, rapid MIP image 10 series 904. No distal branch stenosis is appreciated.  IMPRESSION:  1. Relatively mild atherosclerotic changes of the distal left posterior cerebral artery, and distal left M1 segment. 2. Potentially high-grade right M2 focal stenosis, difficult to characterize by NASCET criteria. Milder distal right M2/M3 atherosclerotic change.  This report was finalized on 12/15/2022 11:20 AM by Dr. Andrea Pfeiffer MD.      CT CEREBRAL PERFUSION WITH & WITHOUT CONTRAST    Result Date: 12/15/2022  DATE OF EXAM: 12/15/2022 9:51 AM  PROCEDURE: CT CEREBRAL PERFUSION W WO CONTRAST-  INDICATIONS: stroke  COMPARISON: Head CT scan of same date  TECHNIQUE: Routine transaxial cuts were obtained through the head without administration of contrast. Routine transaxial cuts were then obtained through the head following the intravenous administration of 115 mL of Isovue 300. Core blood volume, core blood flow, mean transit time, and Tmax images were obtained utilizing  the Rapid software protocol. A limited CT angiogram of the head was also performed to measure the blood vessel density.  The radiation dose reduction device was turned on for each scan per the ALARA (As Low as Reasonably Achievable) protocol.  FINDINGS: Rapid analysis is negative with no areas the brain showing cerebral blood flow less than 30% or T-Max greater than 6 seconds. Individual perfusion maps show no consistent focal asymmetry to suggest focal ischemia or infarct.      Negative perfusion scan.  This report was finalized on 12/15/2022 11:04 AM by Dr. Andrea Pfeiffer MD.                Results for orders placed during the hospital encounter of 12/15/22    Adult Transthoracic Echo Complete W/ Cont if Necessary Per Protocol (With Agitated Saline)    Interpretation Summary  •  Left ventricular systolic function is normal. Left ventricular ejection fraction appears to be 66 - 70%.  •  Left ventricular wall thickness is consistent with mild septal asymmetric hypertrophy.  •  Left ventricular diastolic function is consistent with (grade Ia w/high LAP) impaired relaxation.  •  Left atrial volume is mildly increased.  •  Mild aortic valve regurgitation is present.  •  Mild mitral annular calcification is present. There is mild calcification of the mitral valve anterior leaflet      Plan for Follow-up of Pending Labs/Results:     Discharge Details        Discharge Medications      New Medications      Instructions Start Date   aspirin  MG tablet   325 mg, Oral, Daily   Start Date: December 21, 2022     atorvastatin 40 MG tablet  Commonly known as: LIPITOR   40 mg, Oral, Nightly      lidocaine 5 %  Commonly known as: LIDODERM   1 patch, Transdermal, Every 24 Hours Scheduled, Remove & Discard patch within 12 hours or as directed by MD   Start Date: December 21, 2022     lisinopril 10 MG tablet  Commonly known as: PRINIVIL,ZESTRIL   10 mg, Oral, Every 24 Hours Scheduled   Start Date: December 21, 2022        Continue  These Medications      Instructions Start Date   Garlic 100 MG tablet   100 mg, Oral, Daily      multivitamin with minerals tablet tablet   1 tablet, Oral, 2 Times Daily             No Known Allergies      Discharge Disposition:  Rehab Facility or Unit (DC - External)    Diet:  Hospital:  Diet Order   Procedures   • Diet: No Mixed Consistencies, No Straw; Texture: Soft to Chew (NDD 3); Soft to Chew: Chopped Meat; Fluid Consistency: Thin (IDDSI 0)       Activity:      Restrictions or Other Recommendations:         CODE STATUS:    Code Status and Medical Interventions:   Ordered at: 12/19/22 1051     Level Of Support Discussed With:    Patient     Code Status (Patient has no pulse and is not breathing):    CPR (Attempt to Resuscitate)     Medical Interventions (Patient has pulse or is breathing):    Full Support     Release to patient:    Routine Release       No future appointments.    Additional Instructions for the Follow-ups that You Need to Schedule     Discharge Follow-up with Specialty: follow up with Stroke Neurology in one month   As directed      Specialty: follow up with Stroke Neurology in one month                     Calvin Zee MD  12/20/22      Time Spent on Discharge:  I spent  40  minutes on this discharge activity which included: face-to-face encounter with the patient, reviewing the data in the system, coordination of the care with the nursing staff as well as consultants, documentation, and entering orders.            Electronically signed by Calvin Zee MD at 12/20/22 9279

## 2022-12-20 NOTE — THERAPY TREATMENT NOTE
Patient Name: Lu Hou  : 1924    MRN: [id number]                              Today's Date: 2022       Admit Date: 12/15/2022    Visit Dx:     ICD-10-CM ICD-9-CM   1. Acute right-sided weakness  R53.1 728.87   2. Hypertensive urgency  I16.0 401.9   3. Dysarthria  R47.1 784.51   4. Dysphagia, unspecified type  R13.10 787.20     Patient Active Problem List   Diagnosis   • Right sided weakness   • Essential hypertension     Past Medical History:   Diagnosis Date   • Hypertension      History reviewed. No pertinent surgical history.   General Information     Row Name 22 0959          OT Time and Intention    Document Type therapy note (daily note)  -AR     Mode of Treatment individual therapy;occupational therapy  -AR     Row Name 22 0959          General Information    Existing Precautions/Restrictions fall;other (see comments)  right-sided weakness, incontinent of urine  -AR     Barriers to Rehab medically complex  -AR     Row Name 22 0959          Cognition    Orientation Status (Cognition) oriented x 4  -AR     Row Name 22 0959          Safety Issues, Functional Mobility    Safety Issues Affecting Function (Mobility) awareness of need for assistance;insight into deficits/self-awareness;judgment;problem-solving;safety precaution awareness;safety precautions follow-through/compliance;sequencing abilities  -AR     Impairments Affecting Function (Mobility) balance;cognition;coordination;endurance/activity tolerance;grasp;motor control;postural/trunk control;range of motion (ROM);sensation/sensory awareness;strength;motor planning  -AR           User Key  (r) = Recorded By, (t) = Taken By, (c) = Cosigned By    Initials Name Provider Type    AR Anh Carroll OT Occupational Therapist                 Mobility/ADL's     Row Name 22 1002          Bed Mobility    Bed Mobility supine-sit;scooting/bridging  -AR     Scooting/Bridging Paron (Bed Mobility) moderate  assist (50% patient effort);verbal cues  -AR     Supine-Sit Margie (Bed Mobility) moderate assist (50% patient effort);2 person assist;verbal cues  -AR     Bed Mobility, Safety Issues decreased use of arms for pushing/pulling;decreased use of legs for bridging/pushing;impaired trunk control for bed mobility  -AR     Assistive Device (Bed Mobility) bed rails;draw sheet;head of bed elevated  -AR     Comment, (Bed Mobility) Pt required verbal and tactile cues to sequence, pt had no c/o dizziness upon sitting EOB. Kypotic posture noted.  -AR     Row Name 12/20/22 1002          Transfers    Transfers sit-stand transfer;stand-sit transfer;bed-chair transfer  -AR     Comment, (Transfers) Verbal and tactile cues for hand placement and to sequence. Pt with difficulty advancing RLE during transfer, forward-flexed posture noted.  -AR     Row Name 12/20/22 1002          Bed-Chair Transfer    Bed-Chair Margie (Transfers) maximum assist (25% patient effort);2 person assist;verbal cues;nonverbal cues (demo/gesture)  -AR     Assistive Device (Bed-Chair Transfers) other (see comments)  -AR     Row Name 12/20/22 1002          Sit-Stand Transfer    Sit-Stand Margie (Transfers) maximum assist (25% patient effort);2 person assist;verbal cues  -AR     Assistive Device (Sit-Stand Transfers) other (see comments)  BUE HHA  -AR     Row Name 12/20/22 1002          Stand-Sit Transfer    Stand-Sit Margie (Transfers) maximum assist (25% patient effort);2 person assist;verbal cues  -AR     Assistive Device (Stand-Sit Transfers) other (see comments)  -AR     Row Name 12/20/22 1002          Activities of Daily Living    BADL Assessment/Intervention upper body dressing;grooming;feeding  -AR     Row Name 12/20/22 1002          Grooming Assessment/Training    Margie Level (Grooming) hair care, combing/brushing;maximum assist (25% patient effort)  -AR     Position (Grooming) supported sitting  -AR     Comment, (Grooming)  Pt limited with hemiparesis RUE an decreased shoulder AROM LUE  -AR     Row Name 12/20/22 1002          Self-Feeding Assessment/Training    Baldwin Level (Feeding) moderate assist (50% patient effort);liquids to mouth  -AR     Position (Self-Feeding) supported sitting  -AR     Comment, (Feeding) No adaptive utensils at bedside. OT issued good  fork and spoon and educated on use, pt able to complete hand-to-mouth pattern. She declined need for adaptive cup  -AR     Row Name 12/20/22 1002          Upper Body Dressing Assessment/Training    Baldwin Level (Upper Body Dressing) don;pajama/robe;maximum assist (25% patient effort)  -AR     Position (Upper Body Dressing) edge of bed sitting  -AR           User Key  (r) = Recorded By, (t) = Taken By, (c) = Cosigned By    Initials Name Provider Type    Anh Boone, MARYANN Occupational Therapist               Obj/Interventions     Row Name 12/20/22 1008          Vision Assessment/Intervention    Visual Impairment/Limitations blurry vision  -AR     Vision Assessment Comment tracking WFL, no glasses at bedside  -AR     Row Name 12/20/22 1008          Shoulder (Therapeutic Exercise)    Shoulder (Therapeutic Exercise) AROM (active range of motion);AAROM (active assistive range of motion)  -AR     Shoulder AROM (Therapeutic Exercise) flexion;extension;10 repetitions;sitting  -AR     Shoulder AAROM (Therapeutic Exercise) left;right;flexion;extension;sitting;10 repetitions  -AR     Row Name 12/20/22 1008          Elbow/Forearm (Therapeutic Exercise)    Elbow/Forearm (Therapeutic Exercise) AROM (active range of motion)  -AR     Elbow/Forearm AROM (Therapeutic Exercise) left;extension;flexion;supination;pronation;10 repetitions;sitting  -AR     Elbow/Forearm AAROM (Therapeutic Exercise) right;flexion;extension;supination;pronation;sitting;10 repetitions  -AR     Row Name 12/20/22 1008          Wrist (Therapeutic Exercise)    Wrist (Therapeutic Exercise) AAROM  (active assistive range of motion)  -AR     Wrist AROM (Therapeutic Exercise) left;extension;flexion;10 repetitions  -AR     Wrist AAROM (Therapeutic Exercise) right;flexion;extension;10 repetitions  -AR     Row Name 12/20/22 1008          Hand (Therapeutic Exercise)    Hand AROM/AAROM (Therapeutic Exercise) AAROM (active assistive range of motion);right;finger flexion;finger extension;10 repetitions  -AR     Row Name 12/20/22 1008          Motor Skills    Coordination fine motor deficit;right;moderate impairment  -AR     Row Name 12/20/22 1008          Balance    Static Sitting Balance contact guard  -AR     Dynamic Sitting Balance minimal assist  -AR     Position, Sitting Balance unsupported;sitting edge of bed  -AR     Static Standing Balance maximum assist;2-person assist;verbal cues  -AR     Dynamic Standing Balance maximum assist;2-person assist;verbal cues  -AR           User Key  (r) = Recorded By, (t) = Taken By, (c) = Cosigned By    Initials Name Provider Type    Anh Boone OT Occupational Therapist               Goals/Plan     Row Name 12/20/22 1014          Bed Mobility Goal 1 (OT)    Progress/Outcomes (Bed Mobility Goal 1, OT) goal ongoing  -AR     Row Name 12/20/22 1014          Transfer Goal 1 (OT)    Progress/Outcome (Transfer Goal 1, OT) goal ongoing  -AR     Row Name 12/20/22 1014          Toileting Goal 1 (OT)    Progress/Outcome (Toileting Goal 1, OT) goal ongoing  -AR     Row Name 12/20/22 1014          Grooming Goal 1 (OT)    Progress/Outcome (Grooming Goal 1, OT) goal ongoing  -AR     Row Name 12/20/22 1014          Problem Specific Goal 1 (OT)    Progress/Outcome (Problem Specific Goal 1, OT) goal ongoing  -AR           User Key  (r) = Recorded By, (t) = Taken By, (c) = Cosigned By    Initials Name Provider Type    Anh Boone OT Occupational Therapist               Clinical Impression     Row Name 12/20/22 1010          Pain Assessment    Pretreatment Pain Rating  0/10 - no pain  -AR     Posttreatment Pain Rating 0/10 - no pain  -AR     Row Name 12/20/22 1010          Plan of Care Review    Plan of Care Reviewed With patient  -AR     Progress no change  -AR     Outcome Evaluation Pt alert, Ox4 and intermittent confusion noted. She completed bed mobility with max assist x2, UB dressing with max assist, grooming with max assist and transfer to chair with max assist x2. She is limited with decreased AROM/FMC/strength RUE, impaired balance, blurred vision and intermittent confusion. Recommend SNF.  -AR     Row Name 12/20/22 1010          Therapy Plan Review/Discharge Plan (OT)    Anticipated Discharge Disposition (OT) skilled nursing facility  -AR     Row Name 12/20/22 1010          Vital Signs    Pre Systolic BP Rehab 176  -AR     Pre Treatment Diastolic BP 77  -AR     Post Systolic BP Rehab 148  -AR     Post Treatment Diastolic BP 70  -AR     Pretreatment Heart Rate (beats/min) 75  -AR     Posttreatment Heart Rate (beats/min) 78  -AR     Pre SpO2 (%) 99  -AR     O2 Delivery Pre Treatment room air  -AR     Post SpO2 (%) 96  -AR     O2 Delivery Post Treatment room air  -AR     Pre Patient Position Supine  -AR     Intra Patient Position Standing  -AR     Post Patient Position Sitting  -AR     Row Name 12/20/22 1010          Positioning and Restraints    Pre-Treatment Position in bed  -AR     Post Treatment Position chair  -AR     In Chair reclined;call light within reach;encouraged to call for assist;exit alarm on;RUE elevated;LUE elevated;compression device;waffle cushion;on mechanical lift sling;legs elevated  -AR           User Key  (r) = Recorded By, (t) = Taken By, (c) = Cosigned By    Initials Name Provider Type    Anh Boone, OT Occupational Therapist               Outcome Measures     Row Name 12/20/22 1015          How much help from another is currently needed...    Putting on and taking off regular lower body clothing? 1  -AR     Bathing (including washing,  rinsing, and drying) 2  -AR     Toileting (which includes using toilet bed pan or urinal) 1  -AR     Putting on and taking off regular upper body clothing 2  -AR     Taking care of personal grooming (such as brushing teeth) 2  -AR     Eating meals 2  -AR     AM-PAC 6 Clicks Score (OT) 10  -AR     Row Name 12/20/22 1015          Functional Assessment    Outcome Measure Options AM-PAC 6 Clicks Daily Activity (OT)  -AR           User Key  (r) = Recorded By, (t) = Taken By, (c) = Cosigned By    Initials Name Provider Type    Anh Boone OT Occupational Therapist                Occupational Therapy Education     Title: PT OT SLP Therapies (Done)     Topic: Occupational Therapy (Done)     Point: ADL training (Done)     Description:   Instruct learner(s) on proper safety adaptation and remediation techniques during self care or transfers.   Instruct in proper use of assistive devices.              Learning Progress Summary           Patient Eager, E,TB,D, VU,NR by AR at 12/20/2022 1015    Acceptance, E, VU by ROBERTH at 12/17/2022 0650    Acceptance, E, NR by CAR at 12/16/2022 1410    Comment: re-orientation, midline sit, self-feeding and grooming initiation and sequencing/problem solving, bed mobility                   Point: Home exercise program (Done)     Description:   Instruct learner(s) on appropriate technique for monitoring, assisting and/or progressing therapeutic exercises/activities.              Learning Progress Summary           Patient Eager, E,TB,D, VU,NR by AR at 12/20/2022 1015    Acceptance, E, VU by ROBERTH at 12/17/2022 0650    Acceptance, E, NR by CAR at 12/16/2022 1410    Comment: re-orientation, midline sit, self-feeding and grooming initiation and sequencing/problem solving, bed mobility                   Point: Precautions (Done)     Description:   Instruct learner(s) on prescribed precautions during self-care and functional transfers.              Learning Progress Summary           Patient  Eager, E,TB,D, VU,NR by AR at 12/20/2022 1015    Acceptance, E, VU by LG at 12/17/2022 0650                   Point: Body mechanics (Done)     Description:   Instruct learner(s) on proper positioning and spine alignment during self-care, functional mobility activities and/or exercises.              Learning Progress Summary           Patient Eager, E,TB,D, VU,NR by AR at 12/20/2022 1015    Acceptance, E, VU by LG at 12/17/2022 0650    Acceptance, E, NR by CAR at 12/16/2022 1410    Comment: re-orientation, midline sit, self-feeding and grooming initiation and sequencing/problem solving, bed mobility                               User Key     Initials Effective Dates Name Provider Type Discipline    CAR 06/16/21 -  Alyssa Orta OT Occupational Therapist OT    AR 06/16/21 -  Anh Carroll OT Occupational Therapist OT     11/16/18 -  Wes Ellis RN Registered Nurse Nurse              OT Recommendation and Plan     Plan of Care Review  Plan of Care Reviewed With: patient  Progress: no change  Outcome Evaluation: Pt alert, Ox4 and intermittent confusion noted. She completed bed mobility with max assist x2, UB dressing with max assist, grooming with max assist and transfer to chair with max assist x2. She is limited with decreased AROM/FMC/strength RUE, impaired balance, blurred vision and intermittent confusion. Recommend SNF.     Time Calculation:    Time Calculation- OT     Row Name 12/20/22 1015             Time Calculation- OT    OT Start Time 0940  -AR      OT Received On 12/20/22  -AR      OT Goal Re-Cert Due Date 12/26/22  -AR         Timed Charges    48535 - OT Therapeutic Exercise Minutes 8  -AR      43933 - OT Self Care/Mgmt Minutes 19  -AR         Total Minutes    Timed Charges Total Minutes 27  -AR       Total Minutes 27  -AR            User Key  (r) = Recorded By, (t) = Taken By, (c) = Cosigned By    Initials Name Provider Type    AR Anh Carroll, OT Occupational Therapist               Therapy Charges for Today     Code Description Service Date Service Provider Modifiers Qty    42620871163 HC OT SELF CARE/MGMT/TRAIN EA 15 MIN 12/20/2022 Anh Carroll OT GO 1    14839078856 HC OT THER PROC EA 15 MIN 12/20/2022 Anh Carroll OT GO 1    35974998820 HC OT THER SUPP EA 15 MIN 12/20/2022 Anh Carroll OT GO 2               Anh Carroll OT  12/20/2022

## 2022-12-22 ENCOUNTER — NURSING HOME (OUTPATIENT)
Dept: INTERNAL MEDICINE | Facility: CLINIC | Age: 87
End: 2022-12-22
Payer: MEDICARE

## 2022-12-22 VITALS
DIASTOLIC BLOOD PRESSURE: 74 MMHG | HEART RATE: 61 BPM | TEMPERATURE: 98.1 F | OXYGEN SATURATION: 95 % | SYSTOLIC BLOOD PRESSURE: 140 MMHG | RESPIRATION RATE: 16 BRPM

## 2022-12-22 DIAGNOSIS — R34 DECREASED URINE OUTPUT: ICD-10-CM

## 2022-12-22 DIAGNOSIS — M25.511 ACUTE PAIN OF RIGHT SHOULDER: ICD-10-CM

## 2022-12-22 DIAGNOSIS — R53.1 RIGHT SIDED WEAKNESS: ICD-10-CM

## 2022-12-22 DIAGNOSIS — I63.9 LEFT-SIDED CEREBROVASCULAR ACCIDENT (CVA): Primary | ICD-10-CM

## 2022-12-22 DIAGNOSIS — I10 ESSENTIAL HYPERTENSION: ICD-10-CM

## 2022-12-22 PROCEDURE — 99305 1ST NF CARE MODERATE MDM 35: CPT | Performed by: INTERNAL MEDICINE

## 2022-12-22 NOTE — LETTER
Nursing Home History and Physical       Jairon Schmitz DO  [address] [place] [address] Phone: [phone]  Fax: [phone]     PATIENT NAME: Lu Hou                                                                          YOB: 1924           DATE OF SERVICE: 12/22/2022  FACILITY:  [facility]    CHIEF COMPLAINT:  Nursing facility admission      HISTORY OF PRESENT ILLNESS:   Patient is a 89-year-old white female with a history of hypertension who was recently hospitalized at [facility] for left acute putamen ischemic stroke.  She was treated under stroke protocol in the hospital without any interventions.  She was discharged on aspirin and high intensity statin.  She complained of right shoulder pain x-ray was performed and presented history of right shoulder replacement.    On exam today, patient was resting comfortably.  She has been pleasant but was not able to provide much of any detailed history.  Nurses do not report any acute events since admission to this facility.  She has been participating with PT.    PAST MEDICAL & SURGICAL HISTORY:   Past Medical History:   Diagnosis Date   • Hypertension       No past surgical history on file.      MEDICATIONS:  I have reviewed and reconciled the patients medication list in the patients chart at the skilled nursing facility on 12/22/2022.      ALLERGIES:  No Known Allergies      SOCIAL HISTORY:  Social History     Socioeconomic History   • Marital status:    Tobacco Use   • Smoking status: Never   • Smokeless tobacco: Never   Substance and Sexual Activity   • Alcohol use: Yes     Alcohol/week: 1.0 standard drink     Types: 1 Glasses of wine per week   • Sexual activity: Defer       FAMILY HISTORY:  Family History   Problem Relation Age of Onset   • No Known Problems Mother    • No Known Problems Father         REVIEW OF SYSTEMS:  Review of Systems   Constitutional: Negative for chills, fatigue and fever.    HENT: Negative for congestion, ear pain, rhinorrhea, sinus pressure and sore throat.    Eyes: Negative for visual disturbance.   Respiratory: Negative for cough, chest tightness, shortness of breath and wheezing.    Cardiovascular: Negative for chest pain, palpitations and leg swelling.   Gastrointestinal: Negative for abdominal pain, blood in stool, constipation, diarrhea, nausea and vomiting.   Endocrine: Negative for polydipsia and polyuria.   Genitourinary: Negative for dysuria and hematuria.   Musculoskeletal: Negative for arthralgias and back pain.   Skin: Negative for rash.   Neurological: Negative for dizziness, light-headedness, numbness and headaches.   Psychiatric/Behavioral: Negative for dysphoric mood and sleep disturbance. The patient is not nervous/anxious.          PHYSICAL EXAMINATION:   VITAL SIGNS: /74   Pulse 61   Temp 98.1 °F (36.7 °C)   Resp 16   SpO2 95%     Physical Exam  Vitals and nursing note reviewed.   Constitutional:       Appearance: Normal appearance. She is well-developed.      Comments: Frail elderly female in no acute distress   HENT:      Head: Normocephalic and atraumatic.      Nose: Nose normal.      Mouth/Throat:      Mouth: Mucous membranes are moist.      Pharynx: No oropharyngeal exudate.   Eyes:      General: No scleral icterus.     Conjunctiva/sclera: Conjunctivae normal.      Pupils: Pupils are equal, round, and reactive to light.   Neck:      Thyroid: No thyromegaly.   Cardiovascular:      Rate and Rhythm: Normal rate and regular rhythm.      Heart sounds: Normal heart sounds. No murmur heard.    No friction rub. No gallop.   Pulmonary:      Effort: Pulmonary effort is normal. No respiratory distress.      Breath sounds: Normal breath sounds. No wheezing.   Abdominal:      General: Bowel sounds are normal. There is no distension.      Palpations: Abdomen is soft.      Tenderness: There is no abdominal tenderness.   Musculoskeletal:         General: No  deformity or signs of injury.      Cervical back: Normal range of motion and neck supple.   Lymphadenopathy:      Cervical: No cervical adenopathy.   Skin:     General: Skin is warm and dry.      Findings: No rash.   Neurological:      Mental Status: She is alert and oriented to person, place, and time.      Motor: Weakness ( Right-sided) present.   Psychiatric:         Mood and Affect: Mood normal.         Behavior: Behavior normal.         RECORDS REVIEW:   Discharge Summary from [facility] 12/20/2023    ASSESSMENT   Diagnoses and all orders for this visit:    1. Left-sided cerebrovascular accident (CVA) (HCC) (Primary)    2. Right sided weakness    3. Essential hypertension    4. Decreased urine output    5. Acute pain of right shoulder         PLAN  Left putamen CVA/right-sided weakness  - Continue physical therapy for strengthening.  - Plan on home health therapy when stable.  - Continue aspirin and statin.    Hypertension  - Blood pressure remains well controlled since being in the facility.    Right shoulder pain  - Expected pain considering patient has had right shoulder replacement.    Decreased urine output  - Monitor signs.  Patient had issues during hospitalization.  Monitor CBC and BMP weekly.        [x]  Discussed Patient in detail with nursing/staff, addressed all needs today.     [x]  Plan of Care Reviewed   [x]  PT/OT Reviewed   [x]  Order Changes  []  Discharge Plans Reviewed  [x]  Advance Directive on file with Nursing Home.   [x]  POA on file with Nursing Home.    [x]  Code Status listed and reviewed.       Jairon Schmitz DO.  1/1/2023      **Part of this note may be an electronic transcription/translation of spoken language to printed text using the Dragon Dictation System.**

## 2023-01-01 NOTE — PROGRESS NOTES
Nursing Home History and Physical       Jairon Schmitz DO  [address] [place] [address] Phone: [phone]  Fax: [phone]     PATIENT NAME: Lu Hou                                                                          YOB: 1924           DATE OF SERVICE: 12/22/2022  FACILITY:  [facility]    CHIEF COMPLAINT:  Nursing facility admission      HISTORY OF PRESENT ILLNESS:   Patient is a 89-year-old white female with a history of hypertension who was recently hospitalized at [facility] for left acute putamen ischemic stroke.  She was treated under stroke protocol in the hospital without any interventions.  She was discharged on aspirin and high intensity statin.  She complained of right shoulder pain x-ray was performed and presented history of right shoulder replacement.    On exam today, patient was resting comfortably.  She has been pleasant but was not able to provide much of any detailed history.  Nurses do not report any acute events since admission to this facility.  She has been participating with PT.    PAST MEDICAL & SURGICAL HISTORY:   Past Medical History:   Diagnosis Date   • Hypertension       No past surgical history on file.      MEDICATIONS:  I have reviewed and reconciled the patients medication list in the patients chart at the skilled nursing facility on 12/22/2022.      ALLERGIES:  No Known Allergies      SOCIAL HISTORY:  Social History     Socioeconomic History   • Marital status:    Tobacco Use   • Smoking status: Never   • Smokeless tobacco: Never   Substance and Sexual Activity   • Alcohol use: Yes     Alcohol/week: 1.0 standard drink     Types: 1 Glasses of wine per week   • Sexual activity: Defer       FAMILY HISTORY:  Family History   Problem Relation Age of Onset   • No Known Problems Mother    • No Known Problems Father         REVIEW OF SYSTEMS:  Review of Systems   Constitutional: Negative for chills, fatigue and fever.    HENT: Negative for congestion, ear pain, rhinorrhea, sinus pressure and sore throat.    Eyes: Negative for visual disturbance.   Respiratory: Negative for cough, chest tightness, shortness of breath and wheezing.    Cardiovascular: Negative for chest pain, palpitations and leg swelling.   Gastrointestinal: Negative for abdominal pain, blood in stool, constipation, diarrhea, nausea and vomiting.   Endocrine: Negative for polydipsia and polyuria.   Genitourinary: Negative for dysuria and hematuria.   Musculoskeletal: Negative for arthralgias and back pain.   Skin: Negative for rash.   Neurological: Negative for dizziness, light-headedness, numbness and headaches.   Psychiatric/Behavioral: Negative for dysphoric mood and sleep disturbance. The patient is not nervous/anxious.          PHYSICAL EXAMINATION:   VITAL SIGNS: /74   Pulse 61   Temp 98.1 °F (36.7 °C)   Resp 16   SpO2 95%     Physical Exam  Vitals and nursing note reviewed.   Constitutional:       Appearance: Normal appearance. She is well-developed.      Comments: Frail elderly female in no acute distress   HENT:      Head: Normocephalic and atraumatic.      Nose: Nose normal.      Mouth/Throat:      Mouth: Mucous membranes are moist.      Pharynx: No oropharyngeal exudate.   Eyes:      General: No scleral icterus.     Conjunctiva/sclera: Conjunctivae normal.      Pupils: Pupils are equal, round, and reactive to light.   Neck:      Thyroid: No thyromegaly.   Cardiovascular:      Rate and Rhythm: Normal rate and regular rhythm.      Heart sounds: Normal heart sounds. No murmur heard.    No friction rub. No gallop.   Pulmonary:      Effort: Pulmonary effort is normal. No respiratory distress.      Breath sounds: Normal breath sounds. No wheezing.   Abdominal:      General: Bowel sounds are normal. There is no distension.      Palpations: Abdomen is soft.      Tenderness: There is no abdominal tenderness.   Musculoskeletal:         General: No  deformity or signs of injury.      Cervical back: Normal range of motion and neck supple.   Lymphadenopathy:      Cervical: No cervical adenopathy.   Skin:     General: Skin is warm and dry.      Findings: No rash.   Neurological:      Mental Status: She is alert and oriented to person, place, and time.      Motor: Weakness ( Right-sided) present.   Psychiatric:         Mood and Affect: Mood normal.         Behavior: Behavior normal.         RECORDS REVIEW:   Discharge Summary from [facility] 12/20/2023    ASSESSMENT   Diagnoses and all orders for this visit:    1. Left-sided cerebrovascular accident (CVA) (HCC) (Primary)    2. Right sided weakness    3. Essential hypertension    4. Decreased urine output    5. Acute pain of right shoulder         PLAN  Left putamen CVA/right-sided weakness  - Continue physical therapy for strengthening.  - Plan on home health therapy when stable.  - Continue aspirin and statin.    Hypertension  - Blood pressure remains well controlled since being in the facility.    Right shoulder pain  - Expected pain considering patient has had right shoulder replacement.    Decreased urine output  - Monitor signs.  Patient had issues during hospitalization.  Monitor CBC and BMP weekly.        [x]  Discussed Patient in detail with nursing/staff, addressed all needs today.     [x]  Plan of Care Reviewed   [x]  PT/OT Reviewed   [x]  Order Changes  []  Discharge Plans Reviewed  [x]  Advance Directive on file with Nursing Home.   [x]  POA on file with Nursing Home.    [x]  Code Status listed and reviewed.       Jairon Schmitz DO.  1/1/2023      **Part of this note may be an electronic transcription/translation of spoken language to printed text using the Dragon Dictation System.**

## 2023-01-16 ENCOUNTER — HOME HEALTH ADMISSION (OUTPATIENT)
Dept: HOME HEALTH SERVICES | Facility: HOME HEALTHCARE | Age: 88
End: 2023-01-16
Payer: MEDICARE

## 2023-01-18 RX ORDER — LISINOPRIL 10 MG/1
TABLET ORAL
Qty: 90 TABLET | OUTPATIENT
Start: 2023-01-18

## 2023-01-18 RX ORDER — LIDOCAINE 50 MG/G
PATCH TOPICAL
Qty: 90 PATCH | OUTPATIENT
Start: 2023-01-18

## 2023-01-18 RX ORDER — ATORVASTATIN CALCIUM 40 MG/1
TABLET, FILM COATED ORAL
Qty: 90 TABLET | OUTPATIENT
Start: 2023-01-18

## 2023-01-19 ENCOUNTER — HOSPITAL ENCOUNTER (OUTPATIENT)
Facility: HOSPITAL | Age: 88
Setting detail: OBSERVATION
LOS: 1 days | Discharge: NURSING, REHAB OR LONG-TERM CARE | End: 2023-01-25
Attending: EMERGENCY MEDICINE | Admitting: INTERNAL MEDICINE
Payer: MEDICARE

## 2023-01-19 ENCOUNTER — APPOINTMENT (OUTPATIENT)
Dept: GENERAL RADIOLOGY | Facility: HOSPITAL | Age: 88
End: 2023-01-19
Payer: MEDICARE

## 2023-01-19 DIAGNOSIS — R53.1 GENERALIZED WEAKNESS: Primary | ICD-10-CM

## 2023-01-19 DIAGNOSIS — R62.7 FAILURE TO THRIVE IN ADULT: ICD-10-CM

## 2023-01-19 DIAGNOSIS — Z74.09 IMPAIRED FUNCTIONAL MOBILITY, BALANCE, GAIT, AND ENDURANCE: ICD-10-CM

## 2023-01-19 PROBLEM — Z86.73 HISTORY OF STROKE: Status: ACTIVE | Noted: 2023-01-19

## 2023-01-19 LAB
ALBUMIN SERPL-MCNC: 3.9 G/DL (ref 3.5–5.2)
ALBUMIN/GLOB SERPL: 1.3 G/DL
ALP SERPL-CCNC: 117 U/L (ref 39–117)
ALT SERPL W P-5'-P-CCNC: 27 U/L (ref 1–33)
AMORPH URATE CRY URNS QL MICRO: ABNORMAL /HPF
ANION GAP SERPL CALCULATED.3IONS-SCNC: 13 MMOL/L (ref 5–15)
AST SERPL-CCNC: 32 U/L (ref 1–32)
BACTERIA UR QL AUTO: ABNORMAL /HPF
BASOPHILS # BLD AUTO: 0.03 10*3/MM3 (ref 0–0.2)
BASOPHILS NFR BLD AUTO: 0.3 % (ref 0–1.5)
BILIRUB SERPL-MCNC: 0.8 MG/DL (ref 0–1.2)
BILIRUB UR QL STRIP: ABNORMAL
BUN SERPL-MCNC: 17 MG/DL (ref 8–23)
BUN/CREAT SERPL: 23.6 (ref 7–25)
CALCIUM SPEC-SCNC: 9.2 MG/DL (ref 8.2–9.6)
CHLORIDE SERPL-SCNC: 109 MMOL/L (ref 98–107)
CLARITY UR: ABNORMAL
CO2 SERPL-SCNC: 21 MMOL/L (ref 22–29)
COLOR UR: ABNORMAL
CREAT SERPL-MCNC: 0.72 MG/DL (ref 0.57–1)
DEPRECATED RDW RBC AUTO: 45.1 FL (ref 37–54)
EGFRCR SERPLBLD CKD-EPI 2021: 75.7 ML/MIN/1.73
EOSINOPHIL # BLD AUTO: 0.06 10*3/MM3 (ref 0–0.4)
EOSINOPHIL NFR BLD AUTO: 0.7 % (ref 0.3–6.2)
ERYTHROCYTE [DISTWIDTH] IN BLOOD BY AUTOMATED COUNT: 13.3 % (ref 12.3–15.4)
GLOBULIN UR ELPH-MCNC: 3 GM/DL
GLUCOSE SERPL-MCNC: 114 MG/DL (ref 65–99)
GLUCOSE UR STRIP-MCNC: NEGATIVE MG/DL
GRAN CASTS URNS QL MICRO: ABNORMAL /LPF
HCT VFR BLD AUTO: 43.3 % (ref 34–46.6)
HGB BLD-MCNC: 14.1 G/DL (ref 12–15.9)
HGB UR QL STRIP.AUTO: ABNORMAL
HOLD SPECIMEN: NORMAL
HYALINE CASTS UR QL AUTO: ABNORMAL /LPF
IMM GRANULOCYTES # BLD AUTO: 0.04 10*3/MM3 (ref 0–0.05)
IMM GRANULOCYTES NFR BLD AUTO: 0.5 % (ref 0–0.5)
KETONES UR QL STRIP: ABNORMAL
LEUKOCYTE ESTERASE UR QL STRIP.AUTO: ABNORMAL
LYMPHOCYTES # BLD AUTO: 1.04 10*3/MM3 (ref 0.7–3.1)
LYMPHOCYTES NFR BLD AUTO: 12.1 % (ref 19.6–45.3)
MAGNESIUM SERPL-MCNC: 1.9 MG/DL (ref 1.7–2.3)
MCH RBC QN AUTO: 30.5 PG (ref 26.6–33)
MCHC RBC AUTO-ENTMCNC: 32.6 G/DL (ref 31.5–35.7)
MCV RBC AUTO: 93.5 FL (ref 79–97)
MONOCYTES # BLD AUTO: 0.84 10*3/MM3 (ref 0.1–0.9)
MONOCYTES NFR BLD AUTO: 9.7 % (ref 5–12)
NEUTROPHILS NFR BLD AUTO: 6.61 10*3/MM3 (ref 1.7–7)
NEUTROPHILS NFR BLD AUTO: 76.7 % (ref 42.7–76)
NITRITE UR QL STRIP: NEGATIVE
NRBC BLD AUTO-RTO: 0 /100 WBC (ref 0–0.2)
PH UR STRIP.AUTO: <=5 [PH] (ref 5–8)
PLATELET # BLD AUTO: 207 10*3/MM3 (ref 140–450)
PMV BLD AUTO: 9.9 FL (ref 6–12)
POTASSIUM SERPL-SCNC: 4.2 MMOL/L (ref 3.5–5.2)
PROT SERPL-MCNC: 6.9 G/DL (ref 6–8.5)
PROT UR QL STRIP: ABNORMAL
QT INTERVAL: 374 MS
QTC INTERVAL: 457 MS
RBC # BLD AUTO: 4.63 10*6/MM3 (ref 3.77–5.28)
RBC # UR STRIP: ABNORMAL /HPF
REF LAB TEST METHOD: ABNORMAL
SODIUM SERPL-SCNC: 143 MMOL/L (ref 136–145)
SP GR UR STRIP: 1.03 (ref 1–1.03)
SQUAMOUS #/AREA URNS HPF: ABNORMAL /HPF
TROPONIN T SERPL-MCNC: 0.02 NG/ML (ref 0–0.03)
UROBILINOGEN UR QL STRIP: ABNORMAL
WBC # UR STRIP: ABNORMAL /HPF
WBC NRBC COR # BLD: 8.62 10*3/MM3 (ref 3.4–10.8)
WHOLE BLOOD HOLD COAG: NORMAL
WHOLE BLOOD HOLD SPECIMEN: NORMAL

## 2023-01-19 PROCEDURE — 71045 X-RAY EXAM CHEST 1 VIEW: CPT

## 2023-01-19 PROCEDURE — 93005 ELECTROCARDIOGRAM TRACING: CPT | Performed by: EMERGENCY MEDICINE

## 2023-01-19 PROCEDURE — 99221 1ST HOSP IP/OBS SF/LOW 40: CPT | Performed by: FAMILY MEDICINE

## 2023-01-19 PROCEDURE — 93005 ELECTROCARDIOGRAM TRACING: CPT

## 2023-01-19 PROCEDURE — 81001 URINALYSIS AUTO W/SCOPE: CPT | Performed by: EMERGENCY MEDICINE

## 2023-01-19 PROCEDURE — 96372 THER/PROPH/DIAG INJ SC/IM: CPT

## 2023-01-19 PROCEDURE — 83735 ASSAY OF MAGNESIUM: CPT | Performed by: EMERGENCY MEDICINE

## 2023-01-19 PROCEDURE — 25010000002 ENOXAPARIN PER 10 MG: Performed by: NURSE PRACTITIONER

## 2023-01-19 PROCEDURE — 85025 COMPLETE CBC W/AUTO DIFF WBC: CPT | Performed by: EMERGENCY MEDICINE

## 2023-01-19 PROCEDURE — 84484 ASSAY OF TROPONIN QUANT: CPT | Performed by: EMERGENCY MEDICINE

## 2023-01-19 PROCEDURE — 80053 COMPREHEN METABOLIC PANEL: CPT | Performed by: EMERGENCY MEDICINE

## 2023-01-19 PROCEDURE — 99285 EMERGENCY DEPT VISIT HI MDM: CPT

## 2023-01-19 RX ORDER — ATORVASTATIN CALCIUM 40 MG/1
40 TABLET, FILM COATED ORAL NIGHTLY
Status: DISCONTINUED | OUTPATIENT
Start: 2023-01-19 | End: 2023-01-26 | Stop reason: HOSPADM

## 2023-01-19 RX ORDER — SODIUM CHLORIDE 0.9 % (FLUSH) 0.9 %
10 SYRINGE (ML) INJECTION AS NEEDED
Status: DISCONTINUED | OUTPATIENT
Start: 2023-01-19 | End: 2023-01-26 | Stop reason: HOSPADM

## 2023-01-19 RX ORDER — POLYETHYLENE GLYCOL 3350 17 G/17G
17 POWDER, FOR SOLUTION ORAL DAILY PRN
Status: DISCONTINUED | OUTPATIENT
Start: 2023-01-19 | End: 2023-01-26 | Stop reason: HOSPADM

## 2023-01-19 RX ORDER — ONDANSETRON 4 MG/1
4 TABLET, FILM COATED ORAL EVERY 6 HOURS PRN
Status: DISCONTINUED | OUTPATIENT
Start: 2023-01-19 | End: 2023-01-26 | Stop reason: HOSPADM

## 2023-01-19 RX ORDER — ALUMINA, MAGNESIA, AND SIMETHICONE 2400; 2400; 240 MG/30ML; MG/30ML; MG/30ML
15 SUSPENSION ORAL EVERY 6 HOURS PRN
Status: DISCONTINUED | OUTPATIENT
Start: 2023-01-19 | End: 2023-01-26 | Stop reason: HOSPADM

## 2023-01-19 RX ORDER — MULTIPLE VITAMINS W/ MINERALS TAB 9MG-400MCG
1 TAB ORAL 2 TIMES DAILY
Status: DISCONTINUED | OUTPATIENT
Start: 2023-01-19 | End: 2023-01-26 | Stop reason: HOSPADM

## 2023-01-19 RX ORDER — LIDOCAINE 50 MG/G
1 PATCH TOPICAL
Status: DISCONTINUED | OUTPATIENT
Start: 2023-01-19 | End: 2023-01-26 | Stop reason: HOSPADM

## 2023-01-19 RX ORDER — LISINOPRIL 10 MG/1
10 TABLET ORAL
Status: DISCONTINUED | OUTPATIENT
Start: 2023-01-19 | End: 2023-01-26 | Stop reason: HOSPADM

## 2023-01-19 RX ORDER — MULTIPLE VITAMINS W/ MINERALS TAB 9MG-400MCG
1 TAB ORAL DAILY
COMMUNITY
End: 2023-01-25 | Stop reason: HOSPADM

## 2023-01-19 RX ORDER — BISACODYL 10 MG
10 SUPPOSITORY, RECTAL RECTAL DAILY PRN
Status: DISCONTINUED | OUTPATIENT
Start: 2023-01-19 | End: 2023-01-26 | Stop reason: HOSPADM

## 2023-01-19 RX ORDER — BISACODYL 5 MG/1
5 TABLET, DELAYED RELEASE ORAL DAILY PRN
Status: DISCONTINUED | OUTPATIENT
Start: 2023-01-19 | End: 2023-01-26 | Stop reason: HOSPADM

## 2023-01-19 RX ORDER — AMOXICILLIN 250 MG
2 CAPSULE ORAL 2 TIMES DAILY
Status: DISCONTINUED | OUTPATIENT
Start: 2023-01-19 | End: 2023-01-26 | Stop reason: HOSPADM

## 2023-01-19 RX ORDER — ENOXAPARIN SODIUM 100 MG/ML
40 INJECTION SUBCUTANEOUS DAILY
Status: DISCONTINUED | OUTPATIENT
Start: 2023-01-19 | End: 2023-01-26 | Stop reason: HOSPADM

## 2023-01-19 RX ORDER — SODIUM CHLORIDE 0.9 % (FLUSH) 0.9 %
10 SYRINGE (ML) INJECTION AS NEEDED
Status: DISCONTINUED | OUTPATIENT
Start: 2023-01-19 | End: 2023-01-20 | Stop reason: SDUPTHER

## 2023-01-19 RX ORDER — SODIUM CHLORIDE 0.9 % (FLUSH) 0.9 %
10 SYRINGE (ML) INJECTION EVERY 12 HOURS SCHEDULED
Status: DISCONTINUED | OUTPATIENT
Start: 2023-01-19 | End: 2023-01-26 | Stop reason: HOSPADM

## 2023-01-19 RX ORDER — ASPIRIN 325 MG
325 TABLET, DELAYED RELEASE (ENTERIC COATED) ORAL DAILY
Status: DISCONTINUED | OUTPATIENT
Start: 2023-01-19 | End: 2023-01-26 | Stop reason: HOSPADM

## 2023-01-19 RX ORDER — ACETAMINOPHEN 325 MG/1
650 TABLET ORAL EVERY 4 HOURS PRN
Status: DISCONTINUED | OUTPATIENT
Start: 2023-01-19 | End: 2023-01-26 | Stop reason: HOSPADM

## 2023-01-19 RX ORDER — ACETAMINOPHEN 325 MG/1
650 TABLET ORAL EVERY 6 HOURS PRN
COMMUNITY

## 2023-01-19 RX ORDER — SODIUM CHLORIDE 9 MG/ML
40 INJECTION, SOLUTION INTRAVENOUS AS NEEDED
Status: DISCONTINUED | OUTPATIENT
Start: 2023-01-19 | End: 2023-01-26 | Stop reason: HOSPADM

## 2023-01-19 RX ORDER — ONDANSETRON 2 MG/ML
4 INJECTION INTRAMUSCULAR; INTRAVENOUS EVERY 6 HOURS PRN
Status: DISCONTINUED | OUTPATIENT
Start: 2023-01-19 | End: 2023-01-26 | Stop reason: HOSPADM

## 2023-01-19 RX ORDER — CHOLECALCIFEROL (VITAMIN D3) 125 MCG
5 CAPSULE ORAL NIGHTLY PRN
Status: DISCONTINUED | OUTPATIENT
Start: 2023-01-19 | End: 2023-01-26 | Stop reason: HOSPADM

## 2023-01-19 RX ADMIN — MULTIPLE VITAMINS W/ MINERALS TAB 1 TABLET: TAB at 20:13

## 2023-01-19 RX ADMIN — LISINOPRIL 10 MG: 10 TABLET ORAL at 16:30

## 2023-01-19 RX ADMIN — ASPIRIN 325 MG: 325 TABLET, COATED ORAL at 16:30

## 2023-01-19 RX ADMIN — ATORVASTATIN CALCIUM 40 MG: 40 TABLET, FILM COATED ORAL at 20:13

## 2023-01-19 RX ADMIN — Medication 10 ML: at 19:04

## 2023-01-19 RX ADMIN — LIDOCAINE 1 PATCH: 50 PATCH CUTANEOUS at 16:31

## 2023-01-19 RX ADMIN — SENNOSIDES AND DOCUSATE SODIUM 2 TABLET: 50; 8.6 TABLET ORAL at 20:14

## 2023-01-19 RX ADMIN — ENOXAPARIN SODIUM 40 MG: 40 INJECTION SUBCUTANEOUS at 16:31

## 2023-01-20 PROCEDURE — G0378 HOSPITAL OBSERVATION PER HR: HCPCS

## 2023-01-20 PROCEDURE — 99232 SBSQ HOSP IP/OBS MODERATE 35: CPT | Performed by: INTERNAL MEDICINE

## 2023-01-20 PROCEDURE — 96372 THER/PROPH/DIAG INJ SC/IM: CPT

## 2023-01-20 PROCEDURE — 25010000002 ENOXAPARIN PER 10 MG: Performed by: NURSE PRACTITIONER

## 2023-01-20 PROCEDURE — 97162 PT EVAL MOD COMPLEX 30 MIN: CPT

## 2023-01-20 PROCEDURE — 97166 OT EVAL MOD COMPLEX 45 MIN: CPT

## 2023-01-20 PROCEDURE — 97530 THERAPEUTIC ACTIVITIES: CPT

## 2023-01-20 RX ADMIN — LISINOPRIL 10 MG: 10 TABLET ORAL at 09:12

## 2023-01-20 RX ADMIN — MULTIPLE VITAMINS W/ MINERALS TAB 1 TABLET: TAB at 21:32

## 2023-01-20 RX ADMIN — ATORVASTATIN CALCIUM 40 MG: 40 TABLET, FILM COATED ORAL at 21:32

## 2023-01-20 RX ADMIN — MULTIPLE VITAMINS W/ MINERALS TAB 1 TABLET: TAB at 09:12

## 2023-01-20 RX ADMIN — SENNOSIDES AND DOCUSATE SODIUM 2 TABLET: 50; 8.6 TABLET ORAL at 09:12

## 2023-01-20 RX ADMIN — ENOXAPARIN SODIUM 40 MG: 40 INJECTION SUBCUTANEOUS at 09:12

## 2023-01-20 RX ADMIN — SENNOSIDES AND DOCUSATE SODIUM 2 TABLET: 50; 8.6 TABLET ORAL at 21:32

## 2023-01-20 RX ADMIN — ASPIRIN 325 MG: 325 TABLET, COATED ORAL at 09:12

## 2023-01-20 RX ADMIN — Medication 10 ML: at 06:37

## 2023-01-20 RX ADMIN — Medication 10 ML: at 09:13

## 2023-01-21 PROCEDURE — 25010000002 ENOXAPARIN PER 10 MG: Performed by: NURSE PRACTITIONER

## 2023-01-21 PROCEDURE — G0378 HOSPITAL OBSERVATION PER HR: HCPCS

## 2023-01-21 PROCEDURE — 96372 THER/PROPH/DIAG INJ SC/IM: CPT

## 2023-01-21 PROCEDURE — 99231 SBSQ HOSP IP/OBS SF/LOW 25: CPT | Performed by: NURSE PRACTITIONER

## 2023-01-21 RX ADMIN — ENOXAPARIN SODIUM 40 MG: 40 INJECTION SUBCUTANEOUS at 09:27

## 2023-01-21 RX ADMIN — MULTIPLE VITAMINS W/ MINERALS TAB 1 TABLET: TAB at 09:27

## 2023-01-21 RX ADMIN — Medication 10 ML: at 19:22

## 2023-01-21 RX ADMIN — ATORVASTATIN CALCIUM 40 MG: 40 TABLET, FILM COATED ORAL at 20:44

## 2023-01-21 RX ADMIN — Medication 10 ML: at 09:27

## 2023-01-21 RX ADMIN — MULTIPLE VITAMINS W/ MINERALS TAB 1 TABLET: TAB at 20:44

## 2023-01-21 RX ADMIN — SENNOSIDES AND DOCUSATE SODIUM 2 TABLET: 50; 8.6 TABLET ORAL at 20:44

## 2023-01-21 RX ADMIN — ASPIRIN 325 MG: 325 TABLET, COATED ORAL at 09:27

## 2023-01-21 RX ADMIN — LISINOPRIL 10 MG: 10 TABLET ORAL at 09:27

## 2023-01-22 PROCEDURE — G0378 HOSPITAL OBSERVATION PER HR: HCPCS

## 2023-01-22 PROCEDURE — 25010000002 ENOXAPARIN PER 10 MG: Performed by: NURSE PRACTITIONER

## 2023-01-22 PROCEDURE — 99231 SBSQ HOSP IP/OBS SF/LOW 25: CPT | Performed by: PHYSICIAN ASSISTANT

## 2023-01-22 PROCEDURE — 96372 THER/PROPH/DIAG INJ SC/IM: CPT

## 2023-01-22 RX ADMIN — MULTIPLE VITAMINS W/ MINERALS TAB 1 TABLET: TAB at 21:32

## 2023-01-22 RX ADMIN — Medication 10 ML: at 09:59

## 2023-01-22 RX ADMIN — ATORVASTATIN CALCIUM 40 MG: 40 TABLET, FILM COATED ORAL at 21:32

## 2023-01-22 RX ADMIN — MULTIPLE VITAMINS W/ MINERALS TAB 1 TABLET: TAB at 09:59

## 2023-01-22 RX ADMIN — ENOXAPARIN SODIUM 40 MG: 40 INJECTION SUBCUTANEOUS at 09:59

## 2023-01-22 RX ADMIN — LISINOPRIL 10 MG: 10 TABLET ORAL at 09:59

## 2023-01-22 RX ADMIN — Medication 10 ML: at 22:00

## 2023-01-22 RX ADMIN — ASPIRIN 325 MG: 325 TABLET, COATED ORAL at 09:59

## 2023-01-22 RX ADMIN — SENNOSIDES AND DOCUSATE SODIUM 2 TABLET: 50; 8.6 TABLET ORAL at 09:59

## 2023-01-23 PROCEDURE — G0378 HOSPITAL OBSERVATION PER HR: HCPCS

## 2023-01-23 PROCEDURE — 96372 THER/PROPH/DIAG INJ SC/IM: CPT

## 2023-01-23 PROCEDURE — 97535 SELF CARE MNGMENT TRAINING: CPT

## 2023-01-23 PROCEDURE — 25010000002 ENOXAPARIN PER 10 MG: Performed by: NURSE PRACTITIONER

## 2023-01-23 PROCEDURE — 99231 SBSQ HOSP IP/OBS SF/LOW 25: CPT | Performed by: STUDENT IN AN ORGANIZED HEALTH CARE EDUCATION/TRAINING PROGRAM

## 2023-01-23 RX ADMIN — ASPIRIN 325 MG: 325 TABLET, COATED ORAL at 09:34

## 2023-01-23 RX ADMIN — ATORVASTATIN CALCIUM 40 MG: 40 TABLET, FILM COATED ORAL at 20:06

## 2023-01-23 RX ADMIN — MULTIPLE VITAMINS W/ MINERALS TAB 1 TABLET: TAB at 20:06

## 2023-01-23 RX ADMIN — LISINOPRIL 10 MG: 10 TABLET ORAL at 09:33

## 2023-01-23 RX ADMIN — MULTIPLE VITAMINS W/ MINERALS TAB 1 TABLET: TAB at 09:34

## 2023-01-23 RX ADMIN — Medication 10 ML: at 09:35

## 2023-01-23 RX ADMIN — SENNOSIDES AND DOCUSATE SODIUM 2 TABLET: 50; 8.6 TABLET ORAL at 20:06

## 2023-01-23 RX ADMIN — ENOXAPARIN SODIUM 40 MG: 40 INJECTION SUBCUTANEOUS at 09:34

## 2023-01-24 PROCEDURE — 99231 SBSQ HOSP IP/OBS SF/LOW 25: CPT | Performed by: NURSE PRACTITIONER

## 2023-01-24 PROCEDURE — G0378 HOSPITAL OBSERVATION PER HR: HCPCS

## 2023-01-24 PROCEDURE — 96372 THER/PROPH/DIAG INJ SC/IM: CPT

## 2023-01-24 PROCEDURE — 25010000002 ENOXAPARIN PER 10 MG: Performed by: NURSE PRACTITIONER

## 2023-01-24 PROCEDURE — 97110 THERAPEUTIC EXERCISES: CPT

## 2023-01-24 PROCEDURE — 97530 THERAPEUTIC ACTIVITIES: CPT

## 2023-01-24 RX ADMIN — MULTIPLE VITAMINS W/ MINERALS TAB 1 TABLET: TAB at 20:08

## 2023-01-24 RX ADMIN — LISINOPRIL 10 MG: 10 TABLET ORAL at 09:00

## 2023-01-24 RX ADMIN — ENOXAPARIN SODIUM 40 MG: 40 INJECTION SUBCUTANEOUS at 09:00

## 2023-01-24 RX ADMIN — ASPIRIN 325 MG: 325 TABLET, COATED ORAL at 09:00

## 2023-01-24 RX ADMIN — ATORVASTATIN CALCIUM 40 MG: 40 TABLET, FILM COATED ORAL at 20:08

## 2023-01-24 RX ADMIN — LIDOCAINE 1 PATCH: 50 PATCH CUTANEOUS at 09:00

## 2023-01-24 RX ADMIN — SENNOSIDES AND DOCUSATE SODIUM 2 TABLET: 50; 8.6 TABLET ORAL at 09:00

## 2023-01-24 RX ADMIN — MULTIPLE VITAMINS W/ MINERALS TAB 1 TABLET: TAB at 09:00

## 2023-01-25 VITALS
DIASTOLIC BLOOD PRESSURE: 94 MMHG | WEIGHT: 134.8 LBS | SYSTOLIC BLOOD PRESSURE: 142 MMHG | OXYGEN SATURATION: 94 % | TEMPERATURE: 97.8 F | RESPIRATION RATE: 16 BRPM | BODY MASS INDEX: 23.01 KG/M2 | HEART RATE: 75 BPM | HEIGHT: 64 IN

## 2023-01-25 LAB — SARS-COV-2 RDRP RESP QL NAA+PROBE: NORMAL

## 2023-01-25 PROCEDURE — G0378 HOSPITAL OBSERVATION PER HR: HCPCS

## 2023-01-25 PROCEDURE — 99238 HOSP IP/OBS DSCHRG MGMT 30/<: CPT | Performed by: NURSE PRACTITIONER

## 2023-01-25 PROCEDURE — 96372 THER/PROPH/DIAG INJ SC/IM: CPT

## 2023-01-25 PROCEDURE — 25010000002 ENOXAPARIN PER 10 MG: Performed by: NURSE PRACTITIONER

## 2023-01-25 PROCEDURE — 87635 SARS-COV-2 COVID-19 AMP PRB: CPT | Performed by: INTERNAL MEDICINE

## 2023-01-25 RX ORDER — ASPIRIN 325 MG
325 TABLET, DELAYED RELEASE (ENTERIC COATED) ORAL DAILY
Qty: 30 TABLET | Refills: 0 | Status: SHIPPED | OUTPATIENT
Start: 2023-01-25

## 2023-01-25 RX ORDER — LISINOPRIL 10 MG/1
10 TABLET ORAL
Qty: 30 TABLET | Refills: 0
Start: 2023-01-25

## 2023-01-25 RX ORDER — LISINOPRIL 10 MG/1
10 TABLET ORAL
Qty: 30 TABLET | Refills: 0
Start: 2023-01-25 | End: 2023-01-25 | Stop reason: SDUPTHER

## 2023-01-25 RX ORDER — MULTIPLE VITAMINS W/ MINERALS TAB 9MG-400MCG
1 TAB ORAL 2 TIMES DAILY
Qty: 30 TABLET | Refills: 0 | Status: SHIPPED | OUTPATIENT
Start: 2023-01-25

## 2023-01-25 RX ORDER — ASPIRIN 325 MG
325 TABLET, DELAYED RELEASE (ENTERIC COATED) ORAL DAILY
Qty: 30 TABLET | Refills: 0
Start: 2023-01-25 | End: 2023-01-25 | Stop reason: SDUPTHER

## 2023-01-25 RX ORDER — ATORVASTATIN CALCIUM 40 MG/1
40 TABLET, FILM COATED ORAL NIGHTLY
Qty: 30 TABLET | Refills: 0 | Status: SHIPPED | OUTPATIENT
Start: 2023-01-25

## 2023-01-25 RX ORDER — CHOLECALCIFEROL (VITAMIN D3) 125 MCG
5 CAPSULE ORAL NIGHTLY PRN
Qty: 30 EACH | Refills: 0 | Status: SHIPPED | OUTPATIENT
Start: 2023-01-25

## 2023-01-25 RX ORDER — LIDOCAINE 50 MG/G
1 PATCH TOPICAL
Qty: 60 PATCH | Refills: 0
Start: 2023-01-25 | End: 2023-01-25 | Stop reason: SDUPTHER

## 2023-01-25 RX ORDER — ATORVASTATIN CALCIUM 40 MG/1
40 TABLET, FILM COATED ORAL NIGHTLY
Qty: 30 TABLET | Refills: 0
Start: 2023-01-25 | End: 2023-01-25 | Stop reason: SDUPTHER

## 2023-01-25 RX ORDER — LIDOCAINE 50 MG/G
1 PATCH TOPICAL
Qty: 60 PATCH | Refills: 0 | Status: SHIPPED | OUTPATIENT
Start: 2023-01-25

## 2023-01-25 RX ORDER — MULTIPLE VITAMINS W/ MINERALS TAB 9MG-400MCG
1 TAB ORAL 2 TIMES DAILY
Qty: 30 TABLET | Refills: 0
Start: 2023-01-25 | End: 2023-01-25 | Stop reason: SDUPTHER

## 2023-01-25 RX ADMIN — ENOXAPARIN SODIUM 40 MG: 40 INJECTION SUBCUTANEOUS at 09:51

## 2023-01-25 RX ADMIN — MULTIPLE VITAMINS W/ MINERALS TAB 1 TABLET: TAB at 09:48

## 2023-01-25 RX ADMIN — LISINOPRIL 10 MG: 10 TABLET ORAL at 09:48

## 2023-01-25 RX ADMIN — ASPIRIN 325 MG: 325 TABLET, COATED ORAL at 09:48

## 2023-01-25 RX ADMIN — SENNOSIDES AND DOCUSATE SODIUM 2 TABLET: 50; 8.6 TABLET ORAL at 09:48

## 2025-06-21 ENCOUNTER — HOSPITAL ENCOUNTER (INPATIENT)
Facility: HOSPITAL | Age: OVER 89
LOS: 1 days | Discharge: HOME | End: 2025-06-23
Attending: STUDENT IN AN ORGANIZED HEALTH CARE EDUCATION/TRAINING PROGRAM
Payer: MEDICARE

## 2025-06-21 ENCOUNTER — APPOINTMENT (OUTPATIENT)
Dept: CT IMAGING | Facility: HOSPITAL | Age: OVER 89
End: 2025-06-21
Payer: MEDICARE

## 2025-06-21 DIAGNOSIS — R54 ADVANCED AGE: ICD-10-CM

## 2025-06-21 DIAGNOSIS — Z86.73 HISTORY OF STROKE: ICD-10-CM

## 2025-06-21 DIAGNOSIS — T17.908A ASPIRATION INTO AIRWAY, INITIAL ENCOUNTER: Primary | ICD-10-CM

## 2025-06-21 DIAGNOSIS — I10 ESSENTIAL HYPERTENSION: ICD-10-CM

## 2025-06-21 DIAGNOSIS — J69.0 ASPIRATION PNEUMONIA OF RIGHT LOWER LOBE, UNSPECIFIED ASPIRATION PNEUMONIA TYPE: ICD-10-CM

## 2025-06-21 PROBLEM — H35.3290 MACULAR DEGENERATION, WET: Status: ACTIVE | Noted: 2025-06-21

## 2025-06-21 LAB
ALBUMIN SERPL-MCNC: 3.5 G/DL (ref 3.5–5.2)
ALBUMIN/GLOB SERPL: 1 G/DL
ALP SERPL-CCNC: 85 U/L (ref 39–117)
ALT SERPL W P-5'-P-CCNC: 20 U/L (ref 1–33)
ANION GAP SERPL CALCULATED.3IONS-SCNC: 10 MMOL/L (ref 5–15)
AST SERPL-CCNC: 27 U/L (ref 1–32)
BASOPHILS # BLD AUTO: 0.05 10*3/MM3 (ref 0–0.2)
BASOPHILS NFR BLD AUTO: 0.8 % (ref 0–1.5)
BILIRUB SERPL-MCNC: 0.3 MG/DL (ref 0–1.2)
BUN SERPL-MCNC: 16.6 MG/DL (ref 8–23)
BUN/CREAT SERPL: 30.7 (ref 7–25)
CALCIUM SPEC-SCNC: 8.8 MG/DL (ref 8.2–9.6)
CHLORIDE SERPL-SCNC: 103 MMOL/L (ref 98–107)
CO2 SERPL-SCNC: 25 MMOL/L (ref 22–29)
CREAT SERPL-MCNC: 0.54 MG/DL (ref 0.57–1)
D-LACTATE SERPL-SCNC: 1.5 MMOL/L (ref 0.5–2)
DEPRECATED RDW RBC AUTO: 45.7 FL (ref 37–54)
EGFRCR SERPLBLD CKD-EPI 2021: 81.8 ML/MIN/1.73
EOSINOPHIL # BLD AUTO: 0.28 10*3/MM3 (ref 0–0.4)
EOSINOPHIL NFR BLD AUTO: 4.7 % (ref 0.3–6.2)
ERYTHROCYTE [DISTWIDTH] IN BLOOD BY AUTOMATED COUNT: 14.2 % (ref 12.3–15.4)
FLUAV RNA RESP QL NAA+PROBE: NOT DETECTED
FLUBV RNA RESP QL NAA+PROBE: NOT DETECTED
GLOBULIN UR ELPH-MCNC: 3.6 GM/DL
GLUCOSE SERPL-MCNC: 154 MG/DL (ref 65–99)
HCT VFR BLD AUTO: 38.2 % (ref 34–46.6)
HGB BLD-MCNC: 12.7 G/DL (ref 12–15.9)
HOLD SPECIMEN: NORMAL
IMM GRANULOCYTES # BLD AUTO: 0.05 10*3/MM3 (ref 0–0.05)
IMM GRANULOCYTES NFR BLD AUTO: 0.8 % (ref 0–0.5)
LYMPHOCYTES # BLD AUTO: 1.75 10*3/MM3 (ref 0.7–3.1)
LYMPHOCYTES NFR BLD AUTO: 29.3 % (ref 19.6–45.3)
MCH RBC QN AUTO: 29.6 PG (ref 26.6–33)
MCHC RBC AUTO-ENTMCNC: 33.2 G/DL (ref 31.5–35.7)
MCV RBC AUTO: 89 FL (ref 79–97)
MONOCYTES # BLD AUTO: 0.65 10*3/MM3 (ref 0.1–0.9)
MONOCYTES NFR BLD AUTO: 10.9 % (ref 5–12)
NEUTROPHILS NFR BLD AUTO: 3.2 10*3/MM3 (ref 1.7–7)
NEUTROPHILS NFR BLD AUTO: 53.5 % (ref 42.7–76)
NRBC BLD AUTO-RTO: 0 /100 WBC (ref 0–0.2)
PLATELET # BLD AUTO: 256 10*3/MM3 (ref 140–450)
PMV BLD AUTO: 9.3 FL (ref 6–12)
POTASSIUM SERPL-SCNC: 4.6 MMOL/L (ref 3.5–5.2)
PROT SERPL-MCNC: 7.1 G/DL (ref 6–8.5)
RBC # BLD AUTO: 4.29 10*6/MM3 (ref 3.77–5.28)
RSV RNA RESP QL NAA+PROBE: NOT DETECTED
SARS-COV-2 RNA RESP QL NAA+PROBE: NOT DETECTED
SODIUM SERPL-SCNC: 138 MMOL/L (ref 136–145)
WBC NRBC COR # BLD AUTO: 5.98 10*3/MM3 (ref 3.4–10.8)
WHOLE BLOOD HOLD COAG: NORMAL
WHOLE BLOOD HOLD SPECIMEN: NORMAL

## 2025-06-21 PROCEDURE — 99223 1ST HOSP IP/OBS HIGH 75: CPT | Performed by: NURSE PRACTITIONER

## 2025-06-21 PROCEDURE — 36415 COLL VENOUS BLD VENIPUNCTURE: CPT

## 2025-06-21 PROCEDURE — 71250 CT THORAX DX C-: CPT

## 2025-06-21 PROCEDURE — 87637 SARSCOV2&INF A&B&RSV AMP PRB: CPT | Performed by: STUDENT IN AN ORGANIZED HEALTH CARE EDUCATION/TRAINING PROGRAM

## 2025-06-21 PROCEDURE — G0378 HOSPITAL OBSERVATION PER HR: HCPCS

## 2025-06-21 PROCEDURE — 85025 COMPLETE CBC W/AUTO DIFF WBC: CPT | Performed by: STUDENT IN AN ORGANIZED HEALTH CARE EDUCATION/TRAINING PROGRAM

## 2025-06-21 PROCEDURE — 70490 CT SOFT TISSUE NECK W/O DYE: CPT

## 2025-06-21 PROCEDURE — 80053 COMPREHEN METABOLIC PANEL: CPT | Performed by: STUDENT IN AN ORGANIZED HEALTH CARE EDUCATION/TRAINING PROGRAM

## 2025-06-21 PROCEDURE — 87641 MR-STAPH DNA AMP PROBE: CPT | Performed by: NURSE PRACTITIONER

## 2025-06-21 PROCEDURE — 99285 EMERGENCY DEPT VISIT HI MDM: CPT

## 2025-06-21 PROCEDURE — 83605 ASSAY OF LACTIC ACID: CPT | Performed by: STUDENT IN AN ORGANIZED HEALTH CARE EDUCATION/TRAINING PROGRAM

## 2025-06-21 RX ORDER — POLYVINYL ALCOHOL 14 MG/ML
2 SOLUTION/ DROPS OPHTHALMIC
Status: DISCONTINUED | OUTPATIENT
Start: 2025-06-21 | End: 2025-06-23 | Stop reason: HOSPADM

## 2025-06-21 RX ORDER — IPRATROPIUM BROMIDE AND ALBUTEROL SULFATE 2.5; .5 MG/3ML; MG/3ML
3 SOLUTION RESPIRATORY (INHALATION) EVERY 6 HOURS PRN
Status: DISCONTINUED | OUTPATIENT
Start: 2025-06-21 | End: 2025-06-23 | Stop reason: HOSPADM

## 2025-06-21 RX ORDER — POLYETHYLENE GLYCOL 3350 17 G/17G
17 POWDER, FOR SOLUTION ORAL DAILY PRN
Status: DISCONTINUED | OUTPATIENT
Start: 2025-06-21 | End: 2025-06-23 | Stop reason: HOSPADM

## 2025-06-21 RX ORDER — AZITHROMYCIN 250 MG/1
250 TABLET, FILM COATED ORAL DAILY
Status: DISCONTINUED | OUTPATIENT
Start: 2025-06-22 | End: 2025-06-22

## 2025-06-21 RX ORDER — SODIUM CHLORIDE 0.9 % (FLUSH) 0.9 %
10 SYRINGE (ML) INJECTION EVERY 12 HOURS SCHEDULED
Status: DISCONTINUED | OUTPATIENT
Start: 2025-06-21 | End: 2025-06-23 | Stop reason: HOSPADM

## 2025-06-21 RX ORDER — SODIUM CHLORIDE 0.9 % (FLUSH) 0.9 %
10 SYRINGE (ML) INJECTION AS NEEDED
Status: DISCONTINUED | OUTPATIENT
Start: 2025-06-21 | End: 2025-06-23 | Stop reason: HOSPADM

## 2025-06-21 RX ORDER — MULTIPLE VITAMINS W/ MINERALS TAB 9MG-400MCG
1 TAB ORAL 2 TIMES DAILY
Status: DISCONTINUED | OUTPATIENT
Start: 2025-06-21 | End: 2025-06-23 | Stop reason: HOSPADM

## 2025-06-21 RX ORDER — SENNOSIDES 8.6 MG
325 CAPSULE ORAL DAILY
Status: DISCONTINUED | OUTPATIENT
Start: 2025-06-22 | End: 2025-06-21

## 2025-06-21 RX ORDER — BISACODYL 5 MG/1
5 TABLET, DELAYED RELEASE ORAL DAILY PRN
Status: DISCONTINUED | OUTPATIENT
Start: 2025-06-21 | End: 2025-06-23 | Stop reason: HOSPADM

## 2025-06-21 RX ORDER — ACETAMINOPHEN 325 MG/1
650 TABLET ORAL EVERY 6 HOURS PRN
Status: DISCONTINUED | OUTPATIENT
Start: 2025-06-21 | End: 2025-06-23 | Stop reason: HOSPADM

## 2025-06-21 RX ORDER — SODIUM CHLORIDE 9 MG/ML
40 INJECTION, SOLUTION INTRAVENOUS AS NEEDED
Status: DISCONTINUED | OUTPATIENT
Start: 2025-06-21 | End: 2025-06-23 | Stop reason: HOSPADM

## 2025-06-21 RX ORDER — ASPIRIN 81 MG/1
81 TABLET, CHEWABLE ORAL DAILY
Status: DISCONTINUED | OUTPATIENT
Start: 2025-06-22 | End: 2025-06-23 | Stop reason: HOSPADM

## 2025-06-21 RX ORDER — LISINOPRIL 5 MG/1
5 TABLET ORAL
Status: DISCONTINUED | OUTPATIENT
Start: 2025-06-22 | End: 2025-06-23 | Stop reason: HOSPADM

## 2025-06-21 RX ORDER — BISACODYL 10 MG
10 SUPPOSITORY, RECTAL RECTAL DAILY PRN
Status: DISCONTINUED | OUTPATIENT
Start: 2025-06-21 | End: 2025-06-23 | Stop reason: HOSPADM

## 2025-06-21 RX ORDER — LIDOCAINE 4 G/G
1 PATCH TOPICAL
Status: DISCONTINUED | OUTPATIENT
Start: 2025-06-21 | End: 2025-06-23 | Stop reason: HOSPADM

## 2025-06-21 RX ORDER — AZITHROMYCIN 250 MG/1
500 TABLET, FILM COATED ORAL DAILY
Status: COMPLETED | OUTPATIENT
Start: 2025-06-21 | End: 2025-06-21

## 2025-06-21 RX ORDER — AMOXICILLIN 250 MG
2 CAPSULE ORAL 2 TIMES DAILY PRN
Status: DISCONTINUED | OUTPATIENT
Start: 2025-06-21 | End: 2025-06-23 | Stop reason: HOSPADM

## 2025-06-21 RX ORDER — ATORVASTATIN CALCIUM 40 MG/1
40 TABLET, FILM COATED ORAL NIGHTLY
Status: DISCONTINUED | OUTPATIENT
Start: 2025-06-21 | End: 2025-06-23 | Stop reason: HOSPADM

## 2025-06-21 RX ADMIN — AZITHROMYCIN DIHYDRATE 500 MG: 250 TABLET ORAL at 21:31

## 2025-06-21 RX ADMIN — AMOXICILLIN AND CLAVULANATE POTASSIUM 1 TABLET: 875; 125 TABLET, FILM COATED ORAL at 21:30

## 2025-06-21 RX ADMIN — Medication 1 TABLET: at 21:31

## 2025-06-21 RX ADMIN — Medication 5 MG: at 21:30

## 2025-06-21 RX ADMIN — ATORVASTATIN CALCIUM 40 MG: 40 TABLET, FILM COATED ORAL at 21:30

## 2025-06-21 RX ADMIN — Medication 10 ML: at 21:31

## 2025-06-21 NOTE — Clinical Note
Level of Care: Telemetry [5]   Diagnosis: Aspiration into airway [[id number]]   Admitting Physician: RICCARDO BRENNAN [[id number]]

## 2025-06-21 NOTE — ED NOTES
Lu Hou    Nursing Report ED to Floor:  Mental status: anox1  Ambulatory status: bed bound  Oxygen Therapy:  ra  Cardiac Rhythm: nsr  Admitted from: nursing home  Safety Concerns:  none  Precautions: none  Social Issues: none  ED Room #:  16    ED Nurse Phone Extension - [phone] or may call [phone].      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 No pertinent surgical history.     Admitting Doctor:   Mouna Zuluaga MD    Consulting Provider(s):  Consults       No orders found from 5/23/2025 to 6/22/2025.             Admitting Diagnosis:   There were no encounter diagnoses.    Most Recent Vitals:   Vitals:    06/21/25 1500 06/21/25 1600 06/21/25 1630 06/21/25 1700   BP: 161/78 155/76 150/86 151/75   Pulse: 79 84 86 86   Resp:       Temp:       TempSrc:       SpO2: 93% 93% 94% 95%   Weight:       Height:           Active LDAs/IV Access:   Lines, Drains & Airways       Active LDAs       Name Placement date Placement time Site Days    External Urinary Catheter 12/15/22  1130  --  919    External Urinary Catheter 01/20/23  1900  --  882                    Labs (abnormal labs have a star):   Labs Reviewed   COMPREHENSIVE METABOLIC PANEL - Abnormal; Notable for the following components:       Result Value    Glucose 154 (*)     Creatinine 0.54 (*)     BUN/Creatinine Ratio 30.7 (*)     All other components within normal limits    Narrative:     GFR Categories in Chronic Kidney Disease (CKD)              GFR Category          GFR (mL/min/1.73)    Interpretation  G1                    90 or greater        Normal or high (1)  G2                    60-89                Mild decrease (1)  G3a                   45-59                Mild to moderate decrease  G3b                   30-44                Moderate to severe decrease  G4                     15-29                Severe decrease  G5                    14 or less           Kidney failure    (1)In the absence of evidence of kidney disease, neither GFR category G1 or G2 fulfill the criteria for CKD.    eGFR calculation 2021 CKD-EPI creatinine equation, which does not include race as a factor   CBC WITH AUTO DIFFERENTIAL - Abnormal; Notable for the following components:    Immature Grans % 0.8 (*)     All other components within normal limits   COVID-19/FLUA&B/RSV, NP SWAB IN TRANSPORT MEDIA 1 HR TAT - Normal    Narrative:     Fact sheet for providers: https://www.fda.gov/media/408899/download    Fact sheet for patients: https://www.fda.gov/media/387343/download    Test performed by PCR.   LACTIC ACID, PLASMA - Normal   COVID PRE-OP / PRE-PROCEDURE SCREENING ORDER (NO ISOLATION)    Narrative:     The following orders were created for panel order COVID PRE-OP / PRE-PROCEDURE SCREENING ORDER (NO ISOLATION) - Swab, Nasopharynx.  Procedure                               Abnormality         Status                     ---------                               -----------         ------                     COVID-19, FLU A/B, RSV P...[[id number]]  Normal              Final result                 Please view results for these tests on the individual orders.   RAINBOW DRAW    Narrative:     The following orders were created for panel order [place] Draw.  Procedure                               Abnormality         Status                     ---------                               -----------         ------                     Green Top (Gel)[[id number]]                                  Final result               Lavender Top[[id number]]                                     Final result               Gold Top - SST[[id number]]                                   Final result               Gray Top[[id number]]                                         Final result               Light Blue Top[[id number]]                                    Final result                 Please view results for these tests on the individual orders.   GREEN TOP   LAVENDER TOP   GOLD TOP - SST   GRAY TOP   LIGHT BLUE TOP   CBC AND DIFFERENTIAL    Narrative:     The following orders were created for panel order CBC & Differential.  Procedure                               Abnormality         Status                     ---------                               -----------         ------                     CBC Auto Differential[[id number]]        Abnormal            Final result                 Please view results for these tests on the individual orders.       Meds Given in ED:   Medications - No data to display  No current facility-administered medications for this encounter.       Last NIH score:                                                          Dysphagia screening results:        [place] Coma Scale:  No data recorded     CIWA:        Restraint Type:            Isolation Status:  No active isolations

## 2025-06-21 NOTE — H&P
"    [facility] Medicine Services  HISTORY AND PHYSICAL    Patient Name: Lu Hou  : 1924  MRN: [id number]  Primary Care Physician: Jamal Graham MD  Date of admission: 2025    Subjective   Subjective     Chief Complaint:  Witnessed aspiration event     HPI:  Lu Hou is a 89 y.o. female who is a resident at [facility] for the last 3 years.  Patient with past medical history of HTN, HLD, wet macular degeneration, and ? Remote CVA with residual mild right-sided weakness.  Nonambulatory at baseline.  Requires feeding assist due to right-handed.  Patient had a witnessed choking episode today approximately 30 minutes before ER presentation.  Did not require Heimlich maneuver.  On arrival sats 92-94% on room air.  Labs WNL.  CT chest completed.  Showed right middle lobe airspace disease (aspiration versus pneumonia), and right lower lobe wall thickening with partial fluid-filled bronchi.  Due to advanced age, debility, and aspiration event will admit to hospital medicine service for observation.  Will start Augmentin and azithromycin,and nebs PRN.  Nursing bedside swallow.  Patient and son elected for a \"comfort diet\" as she states she is 89 and prefers to eat dinner tonight.  Will have speech evaluate tomorrow.          Personal History     Past Medical History:   Diagnosis Date    Hypertension     Macular degeneration, wet 2025             History reviewed. No pertinent surgical history.    Family History:  family history includes Hyperlipidemia in her son; Hypertension in her son; No Known Problems in her father and mother.     Social History:  reports that she has never smoked. She has never used smokeless tobacco. She reports that she does not currently use alcohol. She reports that she does not use drugs.  Social History     Social History Narrative    .  Lives at [facility] x [facility] yrs.  No home oxygen.  Not " ambulatory at baseline.  Feeder and crushed meds in pudding        Medications:  Garlic, acetaminophen, aspirin, atorvastatin, lidocaine, lisinopril, melatonin, and multivitamin with minerals    No Known Allergies    Objective   Objective     Vital Signs:   Temp:  [98.2 °F (36.8 °C)] 98.2 °F (36.8 °C)  Heart Rate:  [76-87] 79  Resp:  [15] 15  BP: (150-161)/() 156/85    Physical Exam   Constitutional: Awake, alert.  Resting up in bed in ER.  No acute distress.  Pleasant, interactive chronically debilitated appearing elderly female.  Son at bedside.  Eyes: PERRLA, sclerae anicteric, no conjunctival injection.  Left eye chronic mild drooping.  HENT: NCAT, mucous membranes moist  Neck: Supple, no thyromegaly, no lymphadenopathy, trachea midline  Respiratory: Clear to auscultation bilaterally but decreased right base, nonlabored respirations on room air with sats 94%.  Cardiovascular: RRR, no murmurs, rubs, or gallops, palpable pedal pulses bilaterally  Gastrointestinal: Positive bowel sounds, soft, nontender, nondistended.  Thin habitus.  Musculoskeletal: No bilateral ankle edema, no clubbing or cyanosis to extremities.  Gross spontaneously  Psychiatric: Appropriate affect, cooperative and interactive   Neurologic: Oriented to person, place, son, and general events. Forgetful to year at baseline, generalized weakness with Rt slightly weaker than left chronically (s/p remote CVA). speech clear. Follows commands   Skin: No rashes      Result Review:  I have personally reviewed the results from the time of this admission to 6/21/2025 17:37 EDT and agree with these findings:  [x]  Laboratory list / accordion  [x]  Microbiology  [x]  Radiology  []  EKG/Telemetry   []  Cardiology/Vascular   []  Pathology  [x]  Old records  []  Other:  Most notable findings include:     LAB RESULTS:      Lab 06/21/25  1458 06/21/25  1401   WBC  --  5.98   HEMOGLOBIN  --  12.7   HEMATOCRIT  --  38.2   PLATELETS  --  256   NEUTROS ABS  --   3.20   IMMATURE GRANS (ABS)  --  0.05   LYMPHS ABS  --  1.75   MONOS ABS  --  0.65   EOS ABS  --  0.28   MCV  --  89.0   LACTATE 1.5  --          Lab 06/21/25  1458   SODIUM 138   POTASSIUM 4.6   CHLORIDE 103   CO2 25.0   ANION GAP 10.0   BUN 16.6   CREATININE 0.54*   EGFR 81.8   GLUCOSE 154*   CALCIUM 8.8         Lab 06/21/25  1458   TOTAL PROTEIN 7.1   ALBUMIN 3.5   GLOBULIN 3.6   ALT (SGPT) 20   AST (SGOT) 27   BILIRUBIN 0.3   ALK PHOS 85                     Brief Urine Lab Results       None          Microbiology Results (last 10 days)       Procedure Component Value - Date/Time    COVID PRE-OP / PRE-PROCEDURE SCREENING ORDER (NO ISOLATION) - Swab, Nasopharynx [[id number]]  (Normal) Collected: 06/21/25 1456    Lab Status: Final result Specimen: Swab from Nasopharynx Updated: 06/21/25 1622    Narrative:      The following orders were created for panel order COVID PRE-OP / PRE-PROCEDURE SCREENING ORDER (NO ISOLATION) - Swab, Nasopharynx.  Procedure                               Abnormality         Status                     ---------                               -----------         ------                     COVID-19, FLU A/B, RSV P...[[id number]]  Normal              Final result                 Please view results for these tests on the individual orders.    COVID-19, FLU A/B, RSV PCR 1 HR TAT - Swab, Nasopharynx [[id number]]  (Normal) Collected: 06/21/25 1456    Lab Status: Final result Specimen: Swab from Nasopharynx Updated: 06/21/25 1622     COVID19 Not Detected     Influenza A PCR Not Detected     Influenza B PCR Not Detected     RSV, PCR Not Detected    Narrative:      Fact sheet for providers: https://www.fda.gov/media/014602/download    Fact sheet for patients: https://www.fda.gov/media/414542/download    Test performed by PCR.            CT Chest Without Contrast Diagnostic  Result Date: 6/21/2025  CT CHEST WO CONTRAST DIAGNOSTIC, CT SOFT TISSUE NECK WO CONTRAST Date of Exam: 6/21/2025 3:14 PM EDT  Indication: cough and rhonchorous breath sounds after witnessed aspiration event. Comparison: None available. Technique: Axial CT images were obtained of the neck and chest without contrast administration.  Reconstructed coronal and sagittal images were also obtained. Automated exposure control and iterative construction methods were used. Findings: CT SCAN OF THE NECK SOFT TISSUES WITHOUT CONTRAST: No acute abnormalities are seen of the included base of the brain. Included paranasal sinuses and mastoids appear clear. Cervical airway appears widely patent. No airway foreign body is seen. No enlargement of the adenoids or tonsils is seen. Epiglottis appears normal. Prevertebral soft tissues appear normal. No cervical adenopathy, mass or acute inflammatory change is identified.     Impression: Neck soft tissues appear within normal limits. No evidence of cervical airway stenosis or soft tissue inflammation. CT SCAN OF CHEST WITHOUT CONTRAST: What appears to be an irregular, almost rectangular shaped nodule on axial image 45/2 in the right mid lung is seen as a focal area of discoid atelectasis paralleling the minor fissure on coronal images, specifically coronal image 63/900. This measures 20 x 12 x 5 mm in maximal dimensions. There are mild chronic appearing interstitial changes in the upper and mid lungs. In the lower lungs, there is thickening of multiple right lower lobe bronchi, mild associated interstitial change, and focal right middle lobe airspace opacity adjacent the right hemidiaphragm, that may represent aspiration or small foci of pneumonia. Lungs elsewhere appear clear. Images the mediastinum show calcified gallbladder fundus, and either partially calcifying inspissated debris in the gallbladder from nonfunctioning gallbladder or tightly packed partially calcified gallstones. No inflammatory change or invasive mass is seen. No acute upper abdominal pathology is seen elsewhere. There is an old, smooth  margined vertebral compression deformity of T4. There is an old healed upper sternal fracture. IMPRESSION: 1. Right lower lobe thick-walled, partially fluid-filled bronchi, whether mild aspiration or bronchitis. Focal right middle lobe airspace disease that may represent aspiration or small focus of pneumonia. 2. Plaque-like density adjacent the minor fissure in the right upper lobe. This resembles a pulmonary nodule on axial imaging but is seen as a thin smooth scarlike plaque on coronal imaging, likely benign. Continued imaging follow-up is suggested. 3. Calcified gallbladder findings, and calcified matrix like appearance of the material in the gallbladder lumen, without evidence of either inflammation or invasive mass. Findings may be related to longstanding gallbladder dysfunction. Electronically Signed: Andrea Pfeiffer MD  6/21/2025 3:55 PM EDT  Workstation ID: [id number]    CT Soft Tissue Neck Without Contrast  Result Date: 6/21/2025  CT CHEST WO CONTRAST DIAGNOSTIC, CT SOFT TISSUE NECK WO CONTRAST Date of Exam: 6/21/2025 3:14 PM EDT Indication: cough and rhonchorous breath sounds after witnessed aspiration event. Comparison: None available. Technique: Axial CT images were obtained of the neck and chest without contrast administration.  Reconstructed coronal and sagittal images were also obtained. Automated exposure control and iterative construction methods were used. Findings: CT SCAN OF THE NECK SOFT TISSUES WITHOUT CONTRAST: No acute abnormalities are seen of the included base of the brain. Included paranasal sinuses and mastoids appear clear. Cervical airway appears widely patent. No airway foreign body is seen. No enlargement of the adenoids or tonsils is seen. Epiglottis appears normal. Prevertebral soft tissues appear normal. No cervical adenopathy, mass or acute inflammatory change is identified.     Impression: Neck soft tissues appear within normal limits. No evidence of cervical airway stenosis or soft  tissue inflammation. CT SCAN OF CHEST WITHOUT CONTRAST: What appears to be an irregular, almost rectangular shaped nodule on axial image 45/2 in the right mid lung is seen as a focal area of discoid atelectasis paralleling the minor fissure on coronal images, specifically coronal image 63/900. This measures 20 x 12 x 5 mm in maximal dimensions. There are mild chronic appearing interstitial changes in the upper and mid lungs. In the lower lungs, there is thickening of multiple right lower lobe bronchi, mild associated interstitial change, and focal right middle lobe airspace opacity adjacent the right hemidiaphragm, that may represent aspiration or small foci of pneumonia. Lungs elsewhere appear clear. Images the mediastinum show calcified gallbladder fundus, and either partially calcifying inspissated debris in the gallbladder from nonfunctioning gallbladder or tightly packed partially calcified gallstones. No inflammatory change or invasive mass is seen. No acute upper abdominal pathology is seen elsewhere. There is an old, smooth margined vertebral compression deformity of T4. There is an old healed upper sternal fracture. IMPRESSION: 1. Right lower lobe thick-walled, partially fluid-filled bronchi, whether mild aspiration or bronchitis. Focal right middle lobe airspace disease that may represent aspiration or small focus of pneumonia. 2. Plaque-like density adjacent the minor fissure in the right upper lobe. This resembles a pulmonary nodule on axial imaging but is seen as a thin smooth scarlike plaque on coronal imaging, likely benign. Continued imaging follow-up is suggested. 3. Calcified gallbladder findings, and calcified matrix like appearance of the material in the gallbladder lumen, without evidence of either inflammation or invasive mass. Findings may be related to longstanding gallbladder dysfunction. Electronically Signed: Andrea Pfeiffer MD  6/21/2025 3:55 PM EDT  Workstation ID: [id number]      Results for  "orders placed during the hospital encounter of 12/15/22    Adult Transthoracic Echo Complete W/ Cont if Necessary Per Protocol (With Agitated Saline)    Interpretation Summary    Left ventricular systolic function is normal. Left ventricular ejection fraction appears to be 66 - 70%.    Left ventricular wall thickness is consistent with mild septal asymmetric hypertrophy.    Left ventricular diastolic function is consistent with (grade Ia w/high LAP) impaired relaxation.    Left atrial volume is mildly increased.    Mild aortic valve regurgitation is present.    Mild mitral annular calcification is present. There is mild calcification of the mitral valve anterior leaflet      Assessment & Plan   Assessment & Plan       Aspiration pneumonia    Essential hypertension    History of stroke    Aspiration into airway    Advanced age    Macular degeneration, wet      Lu Hou is a 89 y.o. female who is a resident at [facility] for the last 3 years.  Patient with past medical history of HTN, HLD, wet macular degeneration, and ? Remote CVA with residual mild right-sided weakness.  Nonambulatory at baseline.  Requires feeding assist due to right-handed.  Patient had a witnessed choking episode today approximately 30 minutes before ER presentation.  Did not require Heimlich maneuver.  On arrival sats 92-94% on room air.  Labs WNL.  CT chest completed.  Showed right middle lobe airspace disease (aspiration versus pneumonia), and right lower lobe wall thickening with partial fluid-filled bronchi.  Due to advanced age, debility, and aspiration event will admit to hospital medicine service for observation.  Will start Augmentin and azithromycin,and nebs PRN.  Nursing bedside swallow.  Patient and son elected for a \"comfort diet\" as she states she is 89 and prefers to eat dinner tonight.  Will have speech evaluate tomorrow.      Assessment/Plan:    Witnessed aspiration event  Right LLL wall " "thickening and partially fluid-filled bronchi  RML airspace disease/possible pneumonia  --Witnessed at [facility].  No Heimlich maneuver required.  Remains on room air with sats 92-95%.  -- CT chest shows right LLL wall thickening with partial fluid-filled bronchi, right mL airspace disease aspiration VS pneumonia  -- Check sputum culture  --Nursing bedside swallow  -- Patient and son wish for \"comfort diet\".  Wish to be \"full code\" but do feel comfort diet appropriate with her age of 89 years old and currently on room air and satting well.  Monitor.  -- SLP consult  -- Augmentin/azithromycin x 5 days.  -- Oxygen/nebs as needed  -- feeder at baseline     HTN/HLD  --home meds as tolerated     Hx? CVA w residual right-sided weak > Lt   --data deficit  --non ambulatory.  Up to W/c with lift  - gen weakness, Rt>Lt  --Right-handed.  Requires feeding assist    Wet macular degeneration  Chronic lt lower lid ptosis   --follow with optho, takes 'shots'.  No regular gtts   --eye natural tears prn     Chronic debility  Resident of [facility] x 3 yrs   Non ambulatory   --CM for dc planning  --PT/OT  --TQ2      DVT prophylaxis:  sequentials (not mobile at baseline--continue home ASA for now)     CODE STATUS:  (pt wishes to be full code for now, but asking for \"comfort diet\".  Son agrees).    Code Status (Patient has no pulse and is not breathing): CPR (Attempt to Resuscitate)  Medical Interventions (Patient has pulse or is breathing): Full Support  Level Of Support Discussed With: Patient   Next of Kin (If No Surrogate)      Expected Discharge TBD --back to [facility]   Expected Discharge Date: 6/23/2025; Expected Discharge Time:       Signature: Electronically signed by Kelli Moy, LIDIA, 06/21/25, 5:46 PM EDT              "

## 2025-06-22 LAB
ANION GAP SERPL CALCULATED.3IONS-SCNC: 12 MMOL/L (ref 5–15)
BASOPHILS # BLD AUTO: 0.03 10*3/MM3 (ref 0–0.2)
BASOPHILS NFR BLD AUTO: 0.4 % (ref 0–1.5)
BUN SERPL-MCNC: 21.3 MG/DL (ref 8–23)
BUN/CREAT SERPL: 50.7 (ref 7–25)
CALCIUM SPEC-SCNC: 8.6 MG/DL (ref 8.2–9.6)
CHLORIDE SERPL-SCNC: 102 MMOL/L (ref 98–107)
CO2 SERPL-SCNC: 23 MMOL/L (ref 22–29)
CREAT SERPL-MCNC: 0.42 MG/DL (ref 0.57–1)
CRP SERPL-MCNC: 0.54 MG/DL (ref 0–0.5)
DEPRECATED RDW RBC AUTO: 46.5 FL (ref 37–54)
EGFRCR SERPLBLD CKD-EPI 2021: 86.9 ML/MIN/1.73
EOSINOPHIL # BLD AUTO: 0.13 10*3/MM3 (ref 0–0.4)
EOSINOPHIL NFR BLD AUTO: 1.7 % (ref 0.3–6.2)
ERYTHROCYTE [DISTWIDTH] IN BLOOD BY AUTOMATED COUNT: 14.2 % (ref 12.3–15.4)
GLUCOSE SERPL-MCNC: 98 MG/DL (ref 65–99)
HCT VFR BLD AUTO: 35 % (ref 34–46.6)
HGB BLD-MCNC: 11.4 G/DL (ref 12–15.9)
IMM GRANULOCYTES # BLD AUTO: 0.04 10*3/MM3 (ref 0–0.05)
IMM GRANULOCYTES NFR BLD AUTO: 0.5 % (ref 0–0.5)
LYMPHOCYTES # BLD AUTO: 1.41 10*3/MM3 (ref 0.7–3.1)
LYMPHOCYTES NFR BLD AUTO: 18.4 % (ref 19.6–45.3)
MCH RBC QN AUTO: 29 PG (ref 26.6–33)
MCHC RBC AUTO-ENTMCNC: 32.6 G/DL (ref 31.5–35.7)
MCV RBC AUTO: 89.1 FL (ref 79–97)
MONOCYTES # BLD AUTO: 0.7 10*3/MM3 (ref 0.1–0.9)
MONOCYTES NFR BLD AUTO: 9.1 % (ref 5–12)
MRSA DNA SPEC QL NAA+PROBE: POSITIVE
NEUTROPHILS NFR BLD AUTO: 5.36 10*3/MM3 (ref 1.7–7)
NEUTROPHILS NFR BLD AUTO: 69.9 % (ref 42.7–76)
NRBC BLD AUTO-RTO: 0 /100 WBC (ref 0–0.2)
PLATELET # BLD AUTO: 246 10*3/MM3 (ref 140–450)
PMV BLD AUTO: 9.5 FL (ref 6–12)
POTASSIUM SERPL-SCNC: 4.5 MMOL/L (ref 3.5–5.2)
PROCALCITONIN SERPL-MCNC: 0.06 NG/ML (ref 0–0.25)
RBC # BLD AUTO: 3.93 10*6/MM3 (ref 3.77–5.28)
SODIUM SERPL-SCNC: 137 MMOL/L (ref 136–145)
WBC NRBC COR # BLD AUTO: 7.67 10*3/MM3 (ref 3.4–10.8)

## 2025-06-22 PROCEDURE — 86140 C-REACTIVE PROTEIN: CPT

## 2025-06-22 PROCEDURE — 80048 BASIC METABOLIC PNL TOTAL CA: CPT | Performed by: NURSE PRACTITIONER

## 2025-06-22 PROCEDURE — 92610 EVALUATE SWALLOWING FUNCTION: CPT

## 2025-06-22 PROCEDURE — 99233 SBSQ HOSP IP/OBS HIGH 50: CPT

## 2025-06-22 PROCEDURE — 25010000002 CEFTRIAXONE PER 250 MG

## 2025-06-22 PROCEDURE — 85025 COMPLETE CBC W/AUTO DIFF WBC: CPT | Performed by: NURSE PRACTITIONER

## 2025-06-22 PROCEDURE — 84145 PROCALCITONIN (PCT): CPT

## 2025-06-22 RX ORDER — BENZONATATE 100 MG/1
100 CAPSULE ORAL 3 TIMES DAILY PRN
Status: DISCONTINUED | OUTPATIENT
Start: 2025-06-22 | End: 2025-06-23 | Stop reason: HOSPADM

## 2025-06-22 RX ORDER — SODIUM CHLORIDE FOR INHALATION 7 %
4 VIAL, NEBULIZER (ML) INHALATION ONCE
Status: DISCONTINUED | OUTPATIENT
Start: 2025-06-22 | End: 2025-06-23 | Stop reason: HOSPADM

## 2025-06-22 RX ORDER — GUAIFENESIN 200 MG/1
200 TABLET ORAL EVERY 6 HOURS PRN
Status: DISCONTINUED | OUTPATIENT
Start: 2025-06-22 | End: 2025-06-22

## 2025-06-22 RX ORDER — GUAIFENESIN 200 MG/10ML
200 LIQUID ORAL EVERY 6 HOURS
Status: DISCONTINUED | OUTPATIENT
Start: 2025-06-22 | End: 2025-06-23 | Stop reason: HOSPADM

## 2025-06-22 RX ORDER — GUAIFENESIN 600 MG/1
600 TABLET, EXTENDED RELEASE ORAL EVERY 12 HOURS SCHEDULED
Status: DISCONTINUED | OUTPATIENT
Start: 2025-06-22 | End: 2025-06-22

## 2025-06-22 RX ADMIN — ASPIRIN 81 MG: 81 TABLET, CHEWABLE ORAL at 09:28

## 2025-06-22 RX ADMIN — AZITHROMYCIN DIHYDRATE 250 MG: 250 TABLET ORAL at 09:28

## 2025-06-22 RX ADMIN — SODIUM CHLORIDE 1000 MG: 900 INJECTION INTRAVENOUS at 12:39

## 2025-06-22 RX ADMIN — LISINOPRIL 5 MG: 5 TABLET ORAL at 09:28

## 2025-06-22 RX ADMIN — AMOXICILLIN AND CLAVULANATE POTASSIUM 1 TABLET: 875; 125 TABLET, FILM COATED ORAL at 09:28

## 2025-06-22 RX ADMIN — GUAIFENESIN 200 MG: 200 SOLUTION ORAL at 22:05

## 2025-06-22 RX ADMIN — Medication 10 ML: at 09:28

## 2025-06-22 RX ADMIN — Medication 10 ML: at 22:05

## 2025-06-22 RX ADMIN — Medication 1 TABLET: at 09:28

## 2025-06-22 RX ADMIN — GUAIFENESIN 200 MG: 200 SOLUTION ORAL at 17:05

## 2025-06-22 RX ADMIN — ATORVASTATIN CALCIUM 40 MG: 40 TABLET, FILM COATED ORAL at 22:05

## 2025-06-22 RX ADMIN — Medication 1 TABLET: at 22:05

## 2025-06-22 RX ADMIN — Medication 5 MG: at 22:05

## 2025-06-22 NOTE — PLAN OF CARE
Goal Outcome Evaluation:  Plan of Care Reviewed With: patient, child        Progress:  (eval)  Outcome Evaluation: SLP recs soft to chew diet with chopped meats and thin liquid. See note for more information.    Anticipated Discharge Disposition (SLP): No further SLP services warranted          SLP Swallowing Diagnosis: mild, oral dysphagia (06/22/25 1100)

## 2025-06-22 NOTE — ED PROVIDER NOTES
EMERGENCY DEPARTMENT ENCOUNTER    Pt Name: Lu Hou  MRN: [id number]  Pt :   1924  Room Number:  S454/1  Date of encounter:  2025  PCP: Jamal Graham MD  ED Provider: Dieudonne Ulloa MD    Historian: EMS, patient, son      HPI:  Chief Complaint: Aspiration event        Context: Lu Hou is a 89-year-old woman brought in by EMS in the company by her son because of a witnessed aspiration event at her nursing facility they were feeding her lunch when she had a choking episode and there was concern for rhonchorous breath sounds following this and they were concerned for aspiration so contacted EMS upon arrival here she denies chest pain or difficulty breathing she does states she is having some pain with swallowing.  No other complaints at this time.       PAST MEDICAL HISTORY  Past Medical History:   Diagnosis Date    HLD (hyperlipidemia)     Hypertension     Macular degeneration, wet 2025    Right sided weakness          PAST SURGICAL HISTORY  History reviewed. No pertinent surgical history.      FAMILY HISTORY  Family History   Problem Relation Age of Onset    No Known Problems Mother     No Known Problems Father     Hyperlipidemia Son     Hypertension Son          SOCIAL HISTORY  Social History     Socioeconomic History    Marital status:    Tobacco Use    Smoking status: Never    Smokeless tobacco: Never   Vaping Use    Vaping status: Never Used   Substance and Sexual Activity    Alcohol use: Not Currently     Comment: rare beer    Drug use: Never    Sexual activity: Defer         ALLERGIES  Patient has no known allergies.        REVIEW OF SYSTEMS  Review of Systems       All systems reviewed and negative except for those discussed in HPI.       PHYSICAL EXAM    I have reviewed the triage vital signs and nursing notes.    ED Triage Vitals   Temp Heart Rate Resp BP SpO2   25 1349 25 1349 25 1349 25 1349 25 1349   98.2 °F (36.8 °C) 87  15 160/83 92 %      Temp src Heart Rate Source Patient Position BP Location FiO2 (%)   06/21/25 1349 06/21/25 1818 06/21/25 1818 06/21/25 1818 --   Oral Monitor Lying Right arm        Physical Exam  GENERAL:   Appears in no acute distress  HENT: Nares patent.  EYES: No scleral icterus.  CV: Regular rhythm, regular rate.  RESPIRATORY: Normal effort.  Rhonchorous breath sounds worse on the right than left  ABDOMEN: Soft, nontender  MUSCULOSKELETAL: No deformities.   NEURO: Alert, moves all extremities, follows commands.  SKIN: Warm, dry, no rash visualized.      LAB RESULTS  Recent Results (from the past 24 hours)   Green Top (Gel)    Collection Time: 06/21/25  2:01 PM   Result Value Ref Range    Extra Tube Hold for add-ons.    Lavender Top    Collection Time: 06/21/25  2:01 PM   Result Value Ref Range    Extra Tube hold for add-on    Gold Top - SST    Collection Time: 06/21/25  2:01 PM   Result Value Ref Range    Extra Tube Hold for add-ons.    Light Blue Top    Collection Time: 06/21/25  2:01 PM   Result Value Ref Range    Extra Tube Hold for add-ons.    CBC Auto Differential    Collection Time: 06/21/25  2:01 PM    Specimen: Blood   Result Value Ref Range    WBC 5.98 3.40 - 10.80 10*3/mm3    RBC 4.29 3.77 - 5.28 10*6/mm3    Hemoglobin 12.7 12.0 - 15.9 g/dL    Hematocrit 38.2 34.0 - 46.6 %    MCV 89.0 79.0 - 97.0 fL    MCH 29.6 26.6 - 33.0 pg    MCHC 33.2 31.5 - 35.7 g/dL    RDW 14.2 12.3 - 15.4 %    RDW-SD 45.7 37.0 - 54.0 fl    MPV 9.3 6.0 - 12.0 fL    Platelets 256 140 - 450 10*3/mm3    Neutrophil % 53.5 42.7 - 76.0 %    Lymphocyte % 29.3 19.6 - 45.3 %    Monocyte % 10.9 5.0 - 12.0 %    Eosinophil % 4.7 0.3 - 6.2 %    Basophil % 0.8 0.0 - 1.5 %    Immature Grans % 0.8 (H) 0.0 - 0.5 %    Neutrophils, Absolute 3.20 1.70 - 7.00 10*3/mm3    Lymphocytes, Absolute 1.75 0.70 - 3.10 10*3/mm3    Monocytes, Absolute 0.65 0.10 - 0.90 10*3/mm3    Eosinophils, Absolute 0.28 0.00 - 0.40 10*3/mm3    Basophils, Absolute 0.05  0.00 - 0.20 10*3/mm3    Immature Grans, Absolute 0.05 0.00 - 0.05 10*3/mm3    nRBC 0.0 0.0 - 0.2 /100 WBC   COVID-19, FLU A/B, RSV PCR 1 HR TAT - Swab, Nasopharynx    Collection Time: 06/21/25  2:56 PM    Specimen: Nasopharynx; Swab   Result Value Ref Range    COVID19 Not Detected Not Detected - Ref. Range    Influenza A PCR Not Detected Not Detected    Influenza B PCR Not Detected Not Detected    RSV, PCR Not Detected Not Detected   Gray Top    Collection Time: 06/21/25  2:58 PM   Result Value Ref Range    Extra Tube Hold for add-ons.    Comprehensive Metabolic Panel    Collection Time: 06/21/25  2:58 PM    Specimen: Blood   Result Value Ref Range    Glucose 154 (H) 65 - 99 mg/dL    BUN 16.6 8.0 - 23.0 mg/dL    Creatinine 0.54 (L) 0.57 - 1.00 mg/dL    Sodium 138 136 - 145 mmol/L    Potassium 4.6 3.5 - 5.2 mmol/L    Chloride 103 98 - 107 mmol/L    CO2 25.0 22.0 - 29.0 mmol/L    Calcium 8.8 8.2 - 9.6 mg/dL    Total Protein 7.1 6.0 - 8.5 g/dL    Albumin 3.5 3.5 - 5.2 g/dL    ALT (SGPT) 20 1 - 33 U/L    AST (SGOT) 27 1 - 32 U/L    Alkaline Phosphatase 85 39 - 117 U/L    Total Bilirubin 0.3 0.0 - 1.2 mg/dL    Globulin 3.6 gm/dL    A/G Ratio 1.0 g/dL    BUN/Creatinine Ratio 30.7 (H) 7.0 - 25.0    Anion Gap 10.0 5.0 - 15.0 mmol/L    eGFR 81.8 >60.0 mL/min/1.73   Lactic Acid, Plasma    Collection Time: 06/21/25  2:58 PM    Specimen: Blood   Result Value Ref Range    Lactate 1.5 0.5 - 2.0 mmol/L       If labs were ordered, I independently reviewed the results and considered them in treating the patient.        RADIOLOGY  CT Chest Without Contrast Diagnostic  Result Date: 6/21/2025  CT CHEST WO CONTRAST DIAGNOSTIC, CT SOFT TISSUE NECK WO CONTRAST Date of Exam: 6/21/2025 3:14 PM EDT Indication: cough and rhonchorous breath sounds after witnessed aspiration event. Comparison: None available. Technique: Axial CT images were obtained of the neck and chest without contrast administration.  Reconstructed coronal and sagittal  images were also obtained. Automated exposure control and iterative construction methods were used. Findings: CT SCAN OF THE NECK SOFT TISSUES WITHOUT CONTRAST: No acute abnormalities are seen of the included base of the brain. Included paranasal sinuses and mastoids appear clear. Cervical airway appears widely patent. No airway foreign body is seen. No enlargement of the adenoids or tonsils is seen. Epiglottis appears normal. Prevertebral soft tissues appear normal. No cervical adenopathy, mass or acute inflammatory change is identified.     Neck soft tissues appear within normal limits. No evidence of cervical airway stenosis or soft tissue inflammation. CT SCAN OF CHEST WITHOUT CONTRAST: What appears to be an irregular, almost rectangular shaped nodule on axial image 45/2 in the right mid lung is seen as a focal area of discoid atelectasis paralleling the minor fissure on coronal images, specifically coronal image 63/900. This measures 20 x 12 x 5 mm in maximal dimensions. There are mild chronic appearing interstitial changes in the upper and mid lungs. In the lower lungs, there is thickening of multiple right lower lobe bronchi, mild associated interstitial change, and focal right middle lobe airspace opacity adjacent the right hemidiaphragm, that may represent aspiration or small foci of pneumonia. Lungs elsewhere appear clear. Images the mediastinum show calcified gallbladder fundus, and either partially calcifying inspissated debris in the gallbladder from nonfunctioning gallbladder or tightly packed partially calcified gallstones. No inflammatory change or invasive mass is seen. No acute upper abdominal pathology is seen elsewhere. There is an old, smooth margined vertebral compression deformity of T4. There is an old healed upper sternal fracture. IMPRESSION: 1. Right lower lobe thick-walled, partially fluid-filled bronchi, whether mild aspiration or bronchitis. Focal right middle lobe airspace disease that  may represent aspiration or small focus of pneumonia. 2. Plaque-like density adjacent the minor fissure in the right upper lobe. This resembles a pulmonary nodule on axial imaging but is seen as a thin smooth scarlike plaque on coronal imaging, likely benign. Continued imaging follow-up is suggested. 3. Calcified gallbladder findings, and calcified matrix like appearance of the material in the gallbladder lumen, without evidence of either inflammation or invasive mass. Findings may be related to longstanding gallbladder dysfunction. Electronically Signed: Andrea Pfeiffer MD  6/21/2025 3:55 PM EDT  Workstation ID: [id number]    CT Soft Tissue Neck Without Contrast  Result Date: 6/21/2025  CT CHEST WO CONTRAST DIAGNOSTIC, CT SOFT TISSUE NECK WO CONTRAST Date of Exam: 6/21/2025 3:14 PM EDT Indication: cough and rhonchorous breath sounds after witnessed aspiration event. Comparison: None available. Technique: Axial CT images were obtained of the neck and chest without contrast administration.  Reconstructed coronal and sagittal images were also obtained. Automated exposure control and iterative construction methods were used. Findings: CT SCAN OF THE NECK SOFT TISSUES WITHOUT CONTRAST: No acute abnormalities are seen of the included base of the brain. Included paranasal sinuses and mastoids appear clear. Cervical airway appears widely patent. No airway foreign body is seen. No enlargement of the adenoids or tonsils is seen. Epiglottis appears normal. Prevertebral soft tissues appear normal. No cervical adenopathy, mass or acute inflammatory change is identified.     Neck soft tissues appear within normal limits. No evidence of cervical airway stenosis or soft tissue inflammation. CT SCAN OF CHEST WITHOUT CONTRAST: What appears to be an irregular, almost rectangular shaped nodule on axial image 45/2 in the right mid lung is seen as a focal area of discoid atelectasis paralleling the minor fissure on coronal images, specifically  coronal image 63/900. This measures 20 x 12 x 5 mm in maximal dimensions. There are mild chronic appearing interstitial changes in the upper and mid lungs. In the lower lungs, there is thickening of multiple right lower lobe bronchi, mild associated interstitial change, and focal right middle lobe airspace opacity adjacent the right hemidiaphragm, that may represent aspiration or small foci of pneumonia. Lungs elsewhere appear clear. Images the mediastinum show calcified gallbladder fundus, and either partially calcifying inspissated debris in the gallbladder from nonfunctioning gallbladder or tightly packed partially calcified gallstones. No inflammatory change or invasive mass is seen. No acute upper abdominal pathology is seen elsewhere. There is an old, smooth margined vertebral compression deformity of T4. There is an old healed upper sternal fracture. IMPRESSION: 1. Right lower lobe thick-walled, partially fluid-filled bronchi, whether mild aspiration or bronchitis. Focal right middle lobe airspace disease that may represent aspiration or small focus of pneumonia. 2. Plaque-like density adjacent the minor fissure in the right upper lobe. This resembles a pulmonary nodule on axial imaging but is seen as a thin smooth scarlike plaque on coronal imaging, likely benign. Continued imaging follow-up is suggested. 3. Calcified gallbladder findings, and calcified matrix like appearance of the material in the gallbladder lumen, without evidence of either inflammation or invasive mass. Findings may be related to longstanding gallbladder dysfunction. Electronically Signed: Andrea Pfeiffer MD  6/21/2025 3:55 PM EDT  Workstation ID: [id number]      I ordered and independently reviewed the above noted radiographic studies.      I viewed images of CT of the chest which showed opacities in the right middle and lower lobe concerning for aspiration pneumonia per my independent interpretation.    See radiologist's dictation for  official interpretation.        PROCEDURES    Procedures    No orders to display       MEDICATIONS GIVEN IN ER    Medications   acetaminophen (TYLENOL) tablet 650 mg (has no administration in time range)   atorvastatin (LIPITOR) tablet 40 mg (has no administration in time range)   aspirin EC tablet 325 mg (has no administration in time range)   Lidocaine 4 % 1 patch (has no administration in time range)   lisinopril (PRINIVIL,ZESTRIL) tablet 10 mg (has no administration in time range)   melatonin tablet 5 mg (has no administration in time range)   multivitamin with minerals 1 tablet (has no administration in time range)   sodium chloride 0.9 % flush 10 mL (has no administration in time range)   sodium chloride 0.9 % flush 10 mL (has no administration in time range)   sodium chloride 0.9 % infusion 40 mL (has no administration in time range)   sennosides-docusate (PERICOLACE) 8.6-50 MG per tablet 2 tablet (has no administration in time range)     And   polyethylene glycol (MIRALAX) packet 17 g (has no administration in time range)     And   bisacodyl (DULCOLAX) EC tablet 5 mg (has no administration in time range)     And   bisacodyl (DULCOLAX) suppository 10 mg (has no administration in time range)   amoxicillin-clavulanate (AUGMENTIN) 875-125 MG per tablet 1 tablet (has no administration in time range)   azithromycin (ZITHROMAX) tablet 500 mg (has no administration in time range)     Followed by   azithromycin (ZITHROMAX) tablet 250 mg (has no administration in time range)   ipratropium-albuterol (DUO-NEB) nebulizer solution 3 mL (has no administration in time range)   polyvinyl alcohol (LIQUIFILM) 1.4 % ophthalmic solution 2 drop (has no administration in time range)         MEDICAL DECISION MAKING, PROGRESS, and CONSULTS    All labs, if obtained, have been independently reviewed by me.  All radiology studies, if obtained, have been reviewed by me and the radiologist dictating the report.  All EKGs, if obtained,  have been independently viewed and interpreted by me/my attending physician.      Discussion below represents my analysis of pertinent findings related to patient's condition, differential diagnosis, treatment plan and final disposition.                                          Differential diagnosis:    Aspiration, pneumonia, pneumonitis, sepsis, hypoxia, anemia, electrolyte abnormality      Additional sources:    - Discussed/ obtained information from independent historians: Son    - External (non-ED) record review: Chart review of previous hospitalization showing history of hypertension and stroke    - Chronic or social conditions impacting care: History of stroke, hypertension        Orders placed during this visit:  Orders Placed This Encounter   Procedures    COVID PRE-OP / PRE-PROCEDURE SCREENING ORDER (NO ISOLATION) - Swab, Nasopharynx    COVID-19, FLU A/B, RSV PCR 1 HR TAT - Swab, Nasopharynx    Respiratory Culture - Sputum, Cough    MRSA Screen, PCR (Inpatient) - Swab, Nares    CT Chest Without Contrast Diagnostic    CT Soft Tissue Neck Without Contrast    [place] Draw    Comprehensive Metabolic Panel    Lactic Acid, Plasma    CBC Auto Differential    CBC Auto Differential    Basic Metabolic Panel    Diet: Regular/House; Feeding Assistance - Nursing; Texture: Soft to Chew (NDD 3); Soft to Chew: Chopped Meat; Fluid Consistency: Thin (IDDSI 0)    Vital Signs    Intake & Output    Weigh Patient    Oral Care    Saline Lock & Maintain IV Access    Place Sequential Compression Device    Maintain Sequential Compression Device    Place Sequential Compression Device    Maintain Sequential Compression Device    Continuous Pulse Oximetry    Turn Patient    Up With Assistance    Notify Provider (With Default Parameters)    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  Crush meds in pudding.  HATES applesauce. Giving comfort diet per pt/son.  Nursing Communication    Code Status and Medical Interventions: CPR (Attempt to  Resuscitate); Full Support    Dietary Nutrition Supplements Boost Plus    OT Consult: Eval & Treat Adaptive Equipments Needs    PT Consult: Eval & Treat As Tolerated; Discharge Placement Assessment    Oxygen Therapy- Nasal Cannula; Titrate 1-6 LPM Per SpO2; 90 - 95%    Incentive Spirometry    SLP Consult: Eval & Treat Other; wittnessed aspiration event today.  89 yrs old.  feeder.  CXR/CT chest with RM opacity., RLL partial fluid filled bronchi    Wound Ostomy Eval & Treat    Insert Peripheral IV    Initiate Observation Status    ED Bed Request    Green Top (Gel)    Lavender Top    Gold Top - SST    Gray Top    Light Blue Top    CBC & Differential         Additional orders considered but not ordered:      ED Course:    Consultants:      ED Course as of 06/21/25 2023   Sat Jun 21, 2025   1429 This very nice 89-year-old woman brought in by EMS in the company by her son because of a witnessed aspiration event at her nursing facility they were feeding her lunch when she had a choking episode and there was concern for rhonchorous breath sounds following this and they were concerned for aspiration so contacted EMS upon arrival here she denies chest pain or difficulty breathing she does states she is having some pain with swallowing.  No other complaints at this time. [CC]   1429 She arrived awake and alert generally well-appearing she satting in the 90s on room air and not tachypneic but does have mildly rhonchorous breath sounds.  Intermittent cough.  Concern for aspiration or pneumonia obtaining CT scan of the chest and soft tissue neck obtaining basic laboratory workup and viral panel. [CC]   2021 CBC reassuring nonactionable no leukocytosis no elevation in lactate though with the aspiration event happening only moments prior to her coming here the labs may be lagging viral panel is negative metabolic panel showing mild hyperglycemia but otherwise reassuring CT scan of the chest and neck showing right middle and  lower lobe opacities concerning for aspiration and developing pneumonia.  She continues to sat in the low 90s on room air she may do fine outpatient but based on age and the fact that she has developed significant opacities on CT scan so recently after the event I think it would be better to observe her in the hospital to make sure no clinical decline.  Discussed with Dr. Zuluaga who is in agreement with this.  Patient and son agreeable with this plan [CC]      ED Course User Index  [CC] Dieudonne Ulloa MD              Shared Decision Making:  After my consideration of clinical presentation and any laboratory/radiology studies obtained, I discussed the findings with the patient/patient representative who is in agreement with the treatment plan and the final disposition.   Risks and benefits of discharge and/or observation/admission were discussed.       AS OF 20:23 EDT VITALS:    BP - 174/83  HR - 79  TEMP - 99.2 °F (37.3 °C) (Oral)  O2 SATS - 98%                  DIAGNOSIS  Final diagnoses:   Aspiration into airway, initial encounter   Essential hypertension   History of stroke   Aspiration pneumonia of right lower lobe, unspecified aspiration pneumonia type   Advanced age         DISPOSITION  Admit      Please note that portions of this document were completed with voice recognition software.        Dieudonne Ulloa MD  06/21/25 2023

## 2025-06-22 NOTE — PLAN OF CARE
Problem: Adult Inpatient Plan of Care  Goal: Plan of Care Review  Outcome: Progressing  Flowsheets (Taken 6/22/2025 0418)  Progress: no change     Problem: Adult Inpatient Plan of Care  Goal: Absence of Hospital-Acquired Illness or Injury  Intervention: Identify and Manage Fall Risk  Recent Flowsheet Documentation  Taken 6/22/2025 0415 by Brinda Garnett RN  Safety Promotion/Fall Prevention:   activity supervised   assistive device/personal items within reach   safety round/check completed  Taken 6/22/2025 0205 by Brinda Garnett RN  Safety Promotion/Fall Prevention:   activity supervised   assistive device/personal items within reach   safety round/check completed  Taken 6/22/2025 0015 by Brinda Garnett RN  Safety Promotion/Fall Prevention:   activity supervised   assistive device/personal items within reach   safety round/check completed  Taken 6/21/2025 2215 by Brinda Garnett RN  Safety Promotion/Fall Prevention:   activity supervised   assistive device/personal items within reach   safety round/check completed  Taken 6/21/2025 2015 by Brinda Garnett RN  Safety Promotion/Fall Prevention:   activity supervised   assistive device/personal items within reach   safety round/check completed     Problem: Adult Inpatient Plan of Care  Goal: Absence of Hospital-Acquired Illness or Injury  Intervention: Prevent Skin Injury  Recent Flowsheet Documentation  Taken 6/22/2025 0415 by Brinda Garnett RN  Skin Protection:   silicone foam dressing in place   incontinence pads utilized  Taken 6/22/2025 0205 by Brinda Garnett RN  Skin Protection:   silicone foam dressing in place   incontinence pads utilized  Taken 6/22/2025 0015 by Brinda Garnett RN  Skin Protection:   silicone foam dressing in place   incontinence pads utilized  Taken 6/21/2025 2215 by Brinda Garnett RN  Skin Protection:   silicone foam dressing in place   incontinence pads utilized  Taken 6/21/2025 2015 by Brinda Garnett RN  Skin Protection:   silicone foam dressing in  place   incontinence pads utilized     Problem: Adult Inpatient Plan of Care  Goal: Readiness for Transition of Care  Intervention: Mutually Develop Transition Plan  Recent Flowsheet Documentation  Taken 6/21/2025 1950 by Brinda Garnett RN  Equipment Currently Used at Home: (pt lives at snf) other (see comments)  Taken 6/21/2025 1948 by Brinda Garnett RN  Transportation Anticipated: health plan transportation  Patient/Family Anticipated Services at Transition:      skilled nursing  Patient/Family Anticipates Transition to: long-term care facility     Problem: Violence Risk or Actual  Goal: Anger and Impulse Control  Intervention: Minimize Safety Risk  Recent Flowsheet Documentation  Taken 6/22/2025 0415 by Brinda Garnett RN  Enhanced Safety Measures:   bed alarm set   room near unit station  Taken 6/22/2025 0205 by Brinda Garnett RN  Enhanced Safety Measures:   bed alarm set   room near unit station  Taken 6/22/2025 0015 by Brinda Garnett RN  Enhanced Safety Measures:   bed alarm set   room near unit station  Taken 6/21/2025 2215 by Brinda Garnett RN  Enhanced Safety Measures:   bed alarm set   room near unit station  Taken 6/21/2025 2015 by Brinda Garnett RN  Enhanced Safety Measures:   bed alarm set   room near unit station     Problem: Skin Injury Risk Increased  Goal: Skin Health and Integrity  Intervention: Optimize Skin Protection  Recent Flowsheet Documentation  Taken 6/22/2025 0415 by Brinda Garnett RN  Activity Management: activity encouraged  Pressure Reduction Techniques: frequent weight shift encouraged  Head of Bed (HOB) Positioning: HOB elevated  Pressure Reduction Devices:   pressure-redistributing mattress utilized   positioning supports utilized  Skin Protection:   silicone foam dressing in place   incontinence pads utilized  Taken 6/22/2025 0205 by Brinda Garnett RN  Activity Management: activity encouraged  Pressure Reduction Techniques: frequent weight shift encouraged  Head of Bed (HOB)  Positioning: [facility] elevated  Pressure Reduction Devices:   pressure-redistributing mattress utilized   positioning supports utilized  Skin Protection:   silicone foam dressing in place   incontinence pads utilized  Taken 6/22/2025 0015 by Brinda Garnett RN  Activity Management: activity encouraged  Pressure Reduction Techniques: frequent weight shift encouraged  Head of Bed (HOB) Positioning: [facility] elevated  Pressure Reduction Devices:   positioning supports utilized   pressure-redistributing mattress utilized  Skin Protection:   silicone foam dressing in place   incontinence pads utilized  Taken 6/21/2025 2215 by Brinda Garnett RN  Activity Management: activity encouraged  Pressure Reduction Techniques: frequent weight shift encouraged  Head of Bed (HOB) Positioning: [facility] elevated  Pressure Reduction Devices:   pressure-redistributing mattress utilized   positioning supports utilized  Skin Protection:   silicone foam dressing in place   incontinence pads utilized  Taken 6/21/2025 2015 by Brinda Garnett RN  Activity Management: activity minimized  Pressure Reduction Techniques: frequent weight shift encouraged  Head of Bed (HOB) Positioning: [facility] elevated  Pressure Reduction Devices:   pressure-redistributing mattress utilized   positioning supports utilized  Skin Protection:   silicone foam dressing in place   incontinence pads utilized   Goal Outcome Evaluation:           Progress: no change

## 2025-06-22 NOTE — PROGRESS NOTES
[facility] Medicine Services  PROGRESS NOTE    Patient Name: Lu Hou  : 1924  MRN: [id number]    Date of Admission: 2025  Primary Care Physician: Jamal Graham MD    Subjective   Subjective     CC:  Aspiration pneumonia    HPI:  Patient doing well this a.m., oriented to person, time and place, no complaints otherwise.  Doing well.  Evaluated by speech, mild oral dysphagia, did have choking episode which was witnessed by son concerning for aspiration pneumonia.  No complaints otherwise.  Remains on room air.      Objective   Objective     Vital Signs:   Temp:  [97.6 °F (36.4 °C)-99.2 °F (37.3 °C)] 97.6 °F (36.4 °C)  Heart Rate:  [69-92] 69  Resp:  [14-18] 17  BP: (124-174)/() 124/60     Physical Exam:  Constitutional: No acute distress, awake, alert  HENT: NCAT, mucous membranes moist  Respiratory: Clear to auscultation bilaterally, respiratory effort normal   Cardiovascular: RRR, no murmurs, rubs, or gallops  Gastrointestinal: Positive bowel sounds, soft, nontender, nondistended  Musculoskeletal: No bilateral ankle edema  Psychiatric: Appropriate affect, cooperative  Neurologic: Oriented x 3, strength symmetric in all extremities, Cranial Nerves grossly intact to confrontation, speech clear  Skin: No rashes     Results Reviewed:  LAB RESULTS:      Lab 25  0458 25  1458 25  1401   WBC 7.67  --  5.98   HEMOGLOBIN 11.4*  --  12.7   HEMATOCRIT 35.0  --  38.2   PLATELETS 246  --  256   NEUTROS ABS 5.36  --  3.20   IMMATURE GRANS (ABS) 0.04  --  0.05   LYMPHS ABS 1.41  --  1.75   MONOS ABS 0.70  --  0.65   EOS ABS 0.13  --  0.28   MCV 89.1  --  89.0   LACTATE  --  1.5  --          Lab 25  0458 25  1458   SODIUM 137 138   POTASSIUM 4.5 4.6   CHLORIDE 102 103   CO2 23.0 25.0   ANION GAP 12.0 10.0   BUN 21.3 16.6   CREATININE 0.42* 0.54*   EGFR 86.9 81.8   GLUCOSE 98 154*   CALCIUM 8.6 8.8         Lab 25  1458   TOTAL PROTEIN 7.1    ALBUMIN 3.5   GLOBULIN 3.6   ALT (SGPT) 20   AST (SGOT) 27   BILIRUBIN 0.3   ALK PHOS 85                     Brief Urine Lab Results       None            Microbiology Results Abnormal       Procedure Component Value - Date/Time    MRSA Screen, PCR (Inpatient) - Swab, Nares [[id number]]  (Abnormal) Collected: 06/21/25 0767    Lab Status: Final result Specimen: Swab from Nares Updated: 06/22/25 1564     MRSA PCR Positive    Narrative:      The negative predictive value of this diagnostic test is high and should only be used to consider de-escalating anti-MRSA therapy. A positive result may indicate colonization with MRSA and must be correlated clinically.            CT Chest Without Contrast Diagnostic  Result Date: 6/21/2025  CT CHEST WO CONTRAST DIAGNOSTIC, CT SOFT TISSUE NECK WO CONTRAST Date of Exam: 6/21/2025 3:14 PM EDT Indication: cough and rhonchorous breath sounds after witnessed aspiration event. Comparison: None available. Technique: Axial CT images were obtained of the neck and chest without contrast administration.  Reconstructed coronal and sagittal images were also obtained. Automated exposure control and iterative construction methods were used. Findings: CT SCAN OF THE NECK SOFT TISSUES WITHOUT CONTRAST: No acute abnormalities are seen of the included base of the brain. Included paranasal sinuses and mastoids appear clear. Cervical airway appears widely patent. No airway foreign body is seen. No enlargement of the adenoids or tonsils is seen. Epiglottis appears normal. Prevertebral soft tissues appear normal. No cervical adenopathy, mass or acute inflammatory change is identified.     Impression: Neck soft tissues appear within normal limits. No evidence of cervical airway stenosis or soft tissue inflammation. CT SCAN OF CHEST WITHOUT CONTRAST: What appears to be an irregular, almost rectangular shaped nodule on axial image 45/2 in the right mid lung is seen as a focal area of discoid atelectasis  paralleling the minor fissure on coronal images, specifically coronal image 63/900. This measures 20 x 12 x 5 mm in maximal dimensions. There are mild chronic appearing interstitial changes in the upper and mid lungs. In the lower lungs, there is thickening of multiple right lower lobe bronchi, mild associated interstitial change, and focal right middle lobe airspace opacity adjacent the right hemidiaphragm, that may represent aspiration or small foci of pneumonia. Lungs elsewhere appear clear. Images the mediastinum show calcified gallbladder fundus, and either partially calcifying inspissated debris in the gallbladder from nonfunctioning gallbladder or tightly packed partially calcified gallstones. No inflammatory change or invasive mass is seen. No acute upper abdominal pathology is seen elsewhere. There is an old, smooth margined vertebral compression deformity of T4. There is an old healed upper sternal fracture. IMPRESSION: 1. Right lower lobe thick-walled, partially fluid-filled bronchi, whether mild aspiration or bronchitis. Focal right middle lobe airspace disease that may represent aspiration or small focus of pneumonia. 2. Plaque-like density adjacent the minor fissure in the right upper lobe. This resembles a pulmonary nodule on axial imaging but is seen as a thin smooth scarlike plaque on coronal imaging, likely benign. Continued imaging follow-up is suggested. 3. Calcified gallbladder findings, and calcified matrix like appearance of the material in the gallbladder lumen, without evidence of either inflammation or invasive mass. Findings may be related to longstanding gallbladder dysfunction. Electronically Signed: Andrea Pfeiffer MD  6/21/2025 3:55 PM EDT  Workstation ID: [id number]    CT Soft Tissue Neck Without Contrast  Result Date: 6/21/2025  CT CHEST WO CONTRAST DIAGNOSTIC, CT SOFT TISSUE NECK WO CONTRAST Date of Exam: 6/21/2025 3:14 PM EDT Indication: cough and rhonchorous breath sounds after witnessed  aspiration event. Comparison: None available. Technique: Axial CT images were obtained of the neck and chest without contrast administration.  Reconstructed coronal and sagittal images were also obtained. Automated exposure control and iterative construction methods were used. Findings: CT SCAN OF THE NECK SOFT TISSUES WITHOUT CONTRAST: No acute abnormalities are seen of the included base of the brain. Included paranasal sinuses and mastoids appear clear. Cervical airway appears widely patent. No airway foreign body is seen. No enlargement of the adenoids or tonsils is seen. Epiglottis appears normal. Prevertebral soft tissues appear normal. No cervical adenopathy, mass or acute inflammatory change is identified.     Impression: Neck soft tissues appear within normal limits. No evidence of cervical airway stenosis or soft tissue inflammation. CT SCAN OF CHEST WITHOUT CONTRAST: What appears to be an irregular, almost rectangular shaped nodule on axial image 45/2 in the right mid lung is seen as a focal area of discoid atelectasis paralleling the minor fissure on coronal images, specifically coronal image 63/900. This measures 20 x 12 x 5 mm in maximal dimensions. There are mild chronic appearing interstitial changes in the upper and mid lungs. In the lower lungs, there is thickening of multiple right lower lobe bronchi, mild associated interstitial change, and focal right middle lobe airspace opacity adjacent the right hemidiaphragm, that may represent aspiration or small foci of pneumonia. Lungs elsewhere appear clear. Images the mediastinum show calcified gallbladder fundus, and either partially calcifying inspissated debris in the gallbladder from nonfunctioning gallbladder or tightly packed partially calcified gallstones. No inflammatory change or invasive mass is seen. No acute upper abdominal pathology is seen elsewhere. There is an old, smooth margined vertebral compression deformity of T4. There is an old  healed upper sternal fracture. IMPRESSION: 1. Right lower lobe thick-walled, partially fluid-filled bronchi, whether mild aspiration or bronchitis. Focal right middle lobe airspace disease that may represent aspiration or small focus of pneumonia. 2. Plaque-like density adjacent the minor fissure in the right upper lobe. This resembles a pulmonary nodule on axial imaging but is seen as a thin smooth scarlike plaque on coronal imaging, likely benign. Continued imaging follow-up is suggested. 3. Calcified gallbladder findings, and calcified matrix like appearance of the material in the gallbladder lumen, without evidence of either inflammation or invasive mass. Findings may be related to longstanding gallbladder dysfunction. Electronically Signed: Andrea Pfeiffer MD  6/21/2025 3:55 PM EDT  Workstation ID: [id number]      Results for orders placed during the hospital encounter of 12/15/22    Adult Transthoracic Echo Complete W/ Cont if Necessary Per Protocol (With Agitated Saline)    Interpretation Summary    Left ventricular systolic function is normal. Left ventricular ejection fraction appears to be 66 - 70%.    Left ventricular wall thickness is consistent with mild septal asymmetric hypertrophy.    Left ventricular diastolic function is consistent with (grade Ia w/high LAP) impaired relaxation.    Left atrial volume is mildly increased.    Mild aortic valve regurgitation is present.    Mild mitral annular calcification is present. There is mild calcification of the mitral valve anterior leaflet      Current medications:  Scheduled Meds:aspirin, 81 mg, Oral, Daily  atorvastatin, 40 mg, Oral, Nightly  azithromycin, 500 mg, Intravenous, Q24H  cefTRIAXone, 1,000 mg, Intravenous, Q24H  Lidocaine, 1 patch, Transdermal, Q24H  lisinopril, 5 mg, Oral, Q24H  multivitamin with minerals, 1 tablet, Oral, BID  sodium chloride, 10 mL, Intravenous, Q12H      Continuous Infusions:   PRN Meds:.  acetaminophen    senna-docusate sodium  "**AND** polyethylene glycol **AND** bisacodyl **AND** bisacodyl    ipratropium-albuterol    melatonin    polyvinyl alcohol    sodium chloride    sodium chloride    Assessment & Plan   Assessment & Plan     Active Hospital Problems    Diagnosis  POA    **Aspiration pneumonia [J69.0]  Yes    Aspiration into airway [T17.908A]  Yes    Advanced age [R54]  Yes    Macular degeneration, wet [H35.3290]  Yes    History of stroke [Z86.73]  Not Applicable    Essential hypertension [I10]  Yes      Resolved Hospital Problems   No resolved problems to display.        Brief Hospital Course to date:  Lu Hou is a 89 y.o. female who is a resident at [facility] for the last 3 years.  Patient with past medical history of HTN, HLD, wet macular degeneration, and ? Remote CVA with residual mild right-sided weakness.  Nonambulatory at baseline.  Requires feeding assist due to right-handed.  Patient had a witnessed choking episode today approximately 30 minutes before ER presentation.  Did not require Heimlich maneuver.  On arrival sats 92-94% on room air.  Labs WNL.  CT chest completed.  Showed right middle lobe airspace disease (aspiration versus pneumonia), and right lower lobe wall thickening with partial fluid-filled bronchi.  Due to advanced age, debility, and aspiration event will admit to hospital medicine service for observation.  Will start Augmentin and azithromycin,and nebs PRN.  Nursing bedside swallow.  Patient and son elected for a \"comfort diet\" as she states she is 89 and prefers to eat dinner tonight.  Followed by speech.    Witnessed aspiration event  Right LLL wall thickening and partially fluid-filled bronchi  RML airspace disease/possible pneumonia  Witnessed at SNF.  No Heimlich maneuver required.  Remains on room air with sats 92-95%.  CT chest shows right LLL wall thickening with partial fluid-filled bronchi, right mL airspace disease aspiration VS pneumonia  Check sputum " "culture, pending  Patient and son wish for \"comfort diet\".  Wish to be \"full code\" but do feel comfort diet appropriate with her age of 89 years old and currently on room air and satting well.  Monitor.  Will discuss regarding CODE STATUS particularly if we are to pursue comfort diet  SLP consult, s/p evaluation, mild oral dysphagia  S/p Augmentin and azithromycin, switched to Rocephin and azithromycin.  MRSA swab positive however low likelihood of active MRSA pneumonia  Oxygen/nebs as needed  feeder at baseline      HTN/HLD  home meds as tolerated      Hx? CVA w residual right-sided weak > Lt   data deficit  non ambulatory.  Up to W/c with lift  gen weakness, Rt>Lt  Right-handed.  Requires feeding assist     Wet macular degeneration  Chronic lt lower lid ptosis   follow with optho, takes 'shots'.  No regular gtts   eye natural tears prn      Chronic debility  Resident of SNF x 3 yrs   Non ambulatory   CM for dc planning  PT/OT, pending evaluation  TQ2    Expected Discharge Location and Transportation: [place] from air, SNF versus rehab  Expected Discharge 6/24/2025  Expected Discharge Date: 6/23/2025; Expected Discharge Time:      VTE Prophylaxis:  Mechanical VTE prophylaxis orders are present.     Total time spent: Time Spent: Time Spent: 50 minutes  Time spent includes time reviewing chart, face-to-face time, counseling patient/family/caregiver, ordering medications/tests/procedures, communicating with other health care professionals, documenting clinical information in the electronic health record, and coordination of care.      AM-PAC 6 Clicks Score (PT): 6 (06/21/25 2015)    CODE STATUS:   Code Status and Medical Interventions: CPR (Attempt to Resuscitate); Full Support   Ordered at: 06/21/25 1722     Code Status (Patient has no pulse and is not breathing):    CPR (Attempt to Resuscitate)     Medical Interventions (Patient has pulse or is breathing):    Full Support     Level Of Support Discussed With:    " Patient       Next of Kin (If No Surrogate)       Emily Neely MD  06/22/25

## 2025-06-22 NOTE — THERAPY EVALUATION
Acute Care - Speech Language Pathology   Swallow Initial Evaluation [facility]  Clinical Swallow Evaluation       Patient Name: Lu Hou  : 1924  MRN: [id number]  Today's Date: 2025               Admit Date: 2025    Visit Dx:     ICD-10-CM ICD-9-CM   1. Aspiration into airway, initial encounter  T17.908A 934.9   2. Essential hypertension  I10 401.9   3. History of stroke  Z86.73 V12.54   4. Aspiration pneumonia of right lower lobe, unspecified aspiration pneumonia type  J69.0 507.0   5. Advanced age  R54 797     Patient Active Problem List   Diagnosis    Right sided weakness    Essential hypertension    Generalized weakness    History of stroke    Aspiration into airway    Advanced age    Aspiration pneumonia    Macular degeneration, wet     Past Medical History:   Diagnosis Date    HLD (hyperlipidemia)     Hypertension     Macular degeneration, wet 2025    Right sided weakness      History reviewed. No pertinent surgical history.    SLP Recommendation and Plan  SLP Swallowing Diagnosis: mild, oral dysphagia (25)  SLP Diet Recommendation: soft to chew textures, chopped, thin liquids (25)  Recommended Precautions and Strategies: upright posture during/after eating, small bites of food and sips of liquid, general aspiration precautions (25)  SLP Rec. for Method of Medication Administration: meds whole, meds crushed, with thin liquids, with puree, as tolerated (25)     Monitor for Signs of Aspiration: notify SLP if any concerns (25)     Swallow Criteria for Skilled Therapeutic Interventions Met: no problems identified which require skilled intervention (25)  Anticipated Discharge Disposition (SLP): No further SLP services warranted (25)     Therapy Frequency (Swallow): evaluation only (25)  Predicted Duration Therapy Intervention (Days): 1 week (25)  Oral Care Recommendations: Oral Care  BID/PRN, Toothbrush (06/22/25 1100)                                        Progress:  (eval)  Outcome Evaluation: SLP recs soft to chew diet with chopped meats and thin liquid. See note for more information.      SWALLOW EVALUATION (Last 72 Hours)       SLP Adult Swallow Evaluation       Row Name 06/22/25 1100                   Rehab Evaluation    Document Type evaluation  -MM        Subjective Information no complaints  -MM        Patient Observations alert;cooperative  -MM        Patient/Family/Caregiver Comments/Observations Son present  -MM        Patient Effort good  -MM        Symptoms Noted During/After Treatment none  -MM           General Information    Patient Profile Reviewed yes  -MM        Pertinent History Of Current Problem Pt is a 89 year old female who presented to the facility after a choking episode.  -MM        Current Method of Nutrition soft to chew textures;chopped;thin liquids  -MM        Precautions/Limitations, Vision WFL;for purposes of eval  -MM        Precautions/Limitations, Hearing WFL;for purposes of eval  -MM        Prior Level of Function-Communication unknown  -MM        Prior Level of Function-Swallowing safe, efficient swallowing in all situations  -MM        Plans/Goals Discussed with patient and family;agreed upon  -MM        Barriers to Rehab medically complex  -MM        Patient's Goals for Discharge return to PO diet  -MM           Pain    Pretreatment Pain Rating 0/10 - no pain  -MM        Posttreatment Pain Rating 0/10 - no pain  -MM           Oral Motor Structure and Function    Dentition Assessment natural, present and adequate  -MM        Secretion Management WNL/WFL  -MM        Mucosal Quality moist, healthy  -MM           Oral Musculature and Cranial Nerve Assessment    Oral Motor General Assessment WFL  -MM           General Eating/Swallowing Observations    Eating/Swallowing Skills fed by SLP  -MM        Positioning During Eating upright 90 degree;upright in bed   "-MM        Utensils Used spoon;straw  -MM        Consistencies Trialed regular textures;pureed;thin liquids  -MM           Clinical Swallow Eval    Oral Prep Phase impaired  -MM        Oral Transit WFL  -MM        Oral Residue WFL  -MM        Pharyngeal Phase no overt signs/symptoms of pharyngeal impairment  -MM        Esophageal Phase unremarkable  -MM        Clinical Swallow Evaluation Summary Pt presents with mild oral dysphagia c/b reduced and prolonged mastication with regular consistency. No overt s/sx aspiration t/o length of evaluation. SLP discussed risks of aspiration and MBS to further evaluate swallow function. Pt reported she \"doesn't want people messing with me\" and pt's son supported. SLP recs soft to chew chopped meat and thin liquid diet at this time; suspects choking incident was an atypical presentation due to tough consistency. RN to re-order if pt demonstrates any difficulty or overt s/sx aspiration.  -MM           Oral Prep Concerns    Oral Prep Concerns prolonged mastication  -MM        Prolonged Mastication regular consistencies  -MM           SLP Evaluation Clinical Impression    SLP Swallowing Diagnosis mild;oral dysphagia  -MM        Swallow Criteria for Skilled Therapeutic Interventions Met no problems identified which require skilled intervention  -MM           Recommendations    Therapy Frequency (Swallow) evaluation only  -MM        Predicted Duration Therapy Intervention (Days) 1 week  -MM        SLP Diet Recommendation soft to chew textures;chopped;thin liquids  -MM        Recommended Precautions and Strategies upright posture during/after eating;small bites of food and sips of liquid;general aspiration precautions  -MM        Oral Care Recommendations Oral Care BID/PRN;Toothbrush  -MM        SLP Rec. for Method of Medication Administration meds whole;meds crushed;with thin liquids;with puree;as tolerated  -MM        Monitor for Signs of Aspiration notify SLP if any concerns  -MM     "    Anticipated Discharge Disposition (SLP) No further SLP services warranted  -MM                  User Key  (r) = Recorded By, (t) = Taken By, (c) = Cosigned By      Initials Name Effective Dates    Anali Mauro MS CCC-SLP 08/30/24 -                     EDUCATION  The patient has been educated in the following areas:   Evaluation results and recommendations.                Time Calculation:    Time Calculation- SLP       Row Name 06/22/25 1150             Time Calculation- SLP    SLP Start Time 1100  -MM      SLP Received On 06/22/25  -MM         Untimed Charges    49835-VD Eval Oral Pharyng Swallow Minutes 40  -MM         Total Minutes    Untimed Charges Total Minutes 40  -MM       Total Minutes 40  -MM                User Key  (r) = Recorded By, (t) = Taken By, (c) = Cosigned By      Initials Name Provider Type    Anali Mauro MS CCC-SLP Speech and Language Pathologist                    Therapy Charges for Today       Code Description Service Date Service Provider Modifiers Qty    [id number] HC ST EVAL ORAL PHARYNG SWALLOW 3 6/22/2025 Anali Duarte MS CCC-SLP GN 1                 Anali Duarte MS CCC-EDUARDO  6/22/2025

## 2025-06-23 ENCOUNTER — READMISSION MANAGEMENT (OUTPATIENT)
Dept: CALL CENTER | Facility: HOSPITAL | Age: OVER 89
End: 2025-06-23
Payer: MEDICARE

## 2025-06-23 VITALS
WEIGHT: 110.85 LBS | DIASTOLIC BLOOD PRESSURE: 51 MMHG | RESPIRATION RATE: 16 BRPM | BODY MASS INDEX: 18.92 KG/M2 | HEIGHT: 64 IN | HEART RATE: 66 BPM | SYSTOLIC BLOOD PRESSURE: 97 MMHG | OXYGEN SATURATION: 93 % | TEMPERATURE: 97.5 F

## 2025-06-23 LAB
ANION GAP SERPL CALCULATED.3IONS-SCNC: 9 MMOL/L (ref 5–15)
BUN SERPL-MCNC: 20 MG/DL (ref 8–23)
BUN/CREAT SERPL: 41.7 (ref 7–25)
CALCIUM SPEC-SCNC: 8.6 MG/DL (ref 8.2–9.6)
CHLORIDE SERPL-SCNC: 103 MMOL/L (ref 98–107)
CO2 SERPL-SCNC: 25 MMOL/L (ref 22–29)
CREAT SERPL-MCNC: 0.48 MG/DL (ref 0.57–1)
DEPRECATED RDW RBC AUTO: 47.3 FL (ref 37–54)
EGFRCR SERPLBLD CKD-EPI 2021: 84.1 ML/MIN/1.73
ERYTHROCYTE [DISTWIDTH] IN BLOOD BY AUTOMATED COUNT: 14.3 % (ref 12.3–15.4)
GLUCOSE SERPL-MCNC: 99 MG/DL (ref 65–99)
HCT VFR BLD AUTO: 35.1 % (ref 34–46.6)
HGB BLD-MCNC: 11.4 G/DL (ref 12–15.9)
MAGNESIUM SERPL-MCNC: 2 MG/DL (ref 1.7–2.3)
MCH RBC QN AUTO: 29 PG (ref 26.6–33)
MCHC RBC AUTO-ENTMCNC: 32.5 G/DL (ref 31.5–35.7)
MCV RBC AUTO: 89.3 FL (ref 79–97)
PLATELET # BLD AUTO: 238 10*3/MM3 (ref 140–450)
PMV BLD AUTO: 9.5 FL (ref 6–12)
POTASSIUM SERPL-SCNC: 5 MMOL/L (ref 3.5–5.2)
RBC # BLD AUTO: 3.93 10*6/MM3 (ref 3.77–5.28)
SODIUM SERPL-SCNC: 137 MMOL/L (ref 136–145)
WBC NRBC COR # BLD AUTO: 7.76 10*3/MM3 (ref 3.4–10.8)

## 2025-06-23 PROCEDURE — 97162 PT EVAL MOD COMPLEX 30 MIN: CPT

## 2025-06-23 PROCEDURE — 25010000002 AZITHROMYCIN PER 500 MG

## 2025-06-23 PROCEDURE — 97535 SELF CARE MNGMENT TRAINING: CPT

## 2025-06-23 PROCEDURE — 25010000002 CEFTRIAXONE PER 250 MG

## 2025-06-23 PROCEDURE — 99239 HOSP IP/OBS DSCHRG MGMT >30: CPT

## 2025-06-23 PROCEDURE — 97166 OT EVAL MOD COMPLEX 45 MIN: CPT

## 2025-06-23 PROCEDURE — 85027 COMPLETE CBC AUTOMATED: CPT

## 2025-06-23 PROCEDURE — 25810000003 SODIUM CHLORIDE 0.9 % SOLUTION 250 ML FLEX CONT

## 2025-06-23 PROCEDURE — 80048 BASIC METABOLIC PNL TOTAL CA: CPT

## 2025-06-23 PROCEDURE — 83735 ASSAY OF MAGNESIUM: CPT

## 2025-06-23 RX ORDER — AZITHROMYCIN 500 MG/1
500 TABLET, FILM COATED ORAL DAILY
Qty: 1 TABLET | Refills: 0 | Status: SHIPPED | OUTPATIENT
Start: 2025-06-24 | End: 2025-06-25

## 2025-06-23 RX ORDER — CEFDINIR 300 MG/1
300 CAPSULE ORAL 2 TIMES DAILY
Qty: 6 CAPSULE | Refills: 0 | Status: SHIPPED | OUTPATIENT
Start: 2025-06-24 | End: 2025-06-27

## 2025-06-23 RX ORDER — BENZONATATE 100 MG/1
100 CAPSULE ORAL 3 TIMES DAILY PRN
Qty: 30 CAPSULE | Refills: 0 | Status: SHIPPED | OUTPATIENT
Start: 2025-06-23 | End: 2025-07-03

## 2025-06-23 RX ORDER — GUAIFENESIN 200 MG/10ML
200 LIQUID ORAL 3 TIMES DAILY PRN
Qty: 100 ML | Refills: 0 | Status: SHIPPED | OUTPATIENT
Start: 2025-06-23 | End: 2025-07-03

## 2025-06-23 RX ADMIN — Medication 1 TABLET: at 09:35

## 2025-06-23 RX ADMIN — ASPIRIN 81 MG: 81 TABLET, CHEWABLE ORAL at 09:37

## 2025-06-23 RX ADMIN — LIDOCAINE 1 PATCH: 4 PATCH TOPICAL at 09:37

## 2025-06-23 RX ADMIN — AZITHROMYCIN DIHYDRATE 500 MG: 500 INJECTION, POWDER, LYOPHILIZED, FOR SOLUTION INTRAVENOUS at 09:37

## 2025-06-23 RX ADMIN — SODIUM CHLORIDE 1000 MG: 900 INJECTION INTRAVENOUS at 13:43

## 2025-06-23 RX ADMIN — Medication 10 ML: at 09:38

## 2025-06-23 RX ADMIN — GUAIFENESIN 200 MG: 200 SOLUTION ORAL at 05:01

## 2025-06-23 RX ADMIN — GUAIFENESIN 200 MG: 200 SOLUTION ORAL at 09:35

## 2025-06-23 NOTE — OUTREACH NOTE
Prep Survey      Flowsheet Row Responses   Scientologist facility patient discharged from? [facility]   Is LACE score < 7 ? No   Eligibility Not Eligible   What are the reasons patient is not eligible? [facility]   Does the patient have one of the following disease processes/diagnoses(primary or secondary)? Other   Prep survey completed? Yes            Alicia SAMUEL - Registered Nurse

## 2025-06-23 NOTE — THERAPY DISCHARGE NOTE
Patient Name: Lu Hou  : 1924    MRN: [id number]                              Today's Date: 2025       Admit Date: 2025    Visit Dx:     ICD-10-CM ICD-9-CM   1. Aspiration into airway, initial encounter  T17.908A 934.9   2. Essential hypertension  I10 401.9   3. History of stroke  Z86.73 V12.54   4. Aspiration pneumonia of right lower lobe, unspecified aspiration pneumonia type  J69.0 507.0   5. Advanced age  R54 797     Patient Active Problem List   Diagnosis    Right sided weakness    Essential hypertension    Generalized weakness    History of stroke    Aspiration into airway    Advanced age    Aspiration pneumonia    Macular degeneration, wet     Past Medical History:   Diagnosis Date    HLD (hyperlipidemia)     Hypertension     Macular degeneration, wet 2025    Right sided weakness      History reviewed. No pertinent surgical history.   General Information       Row Name 25 0912          Physical Therapy Time and Intention    Document Type discharge evaluation/summary  -BA     Mode of Treatment physical therapy;co-treatment  -       Row Name 25 0912          General Information    Patient Profile Reviewed yes  -BA     Prior Level of Function --  Unsure of PLOF, as pt unreliable historian and no family present to clarify/obtain. Per pt, was indep w/ standing t/f's, ambulation, and ADLs. Per medical chart, pt is nonambulatory at baseline and gets up to w/c with kandi lift.  -BA     Existing Precautions/Restrictions fall;other (see comments)  remote CVA with residual R side weakness; mild BLE contractures; B hand weakness/tightness; cognitive/memory deficits; monitor BP (hypotension)  -     Barriers to Rehab medically complex;previous functional deficit;cognitive status;contractures  -       Row Name 25 0912          Living Environment    Current Living Arrangements extended care facility;other (see comments)  Per medical chart, lives at [facility].  -BA     People in Home facility resident  -BA       Row Name 06/23/25 0912          Home Main Entrance    Number of Stairs, Main Entrance none  -BA       Row Name 06/23/25 0912          Stairs Within Home, Primary    Number of Stairs, Within Home, Primary none  -BA       Row Name 06/23/25 0912          Cognition    Orientation Status (Cognition) oriented to;person;disoriented to;place;time;situation;verbal cues/prompts needed for orientation;other (see comments)  Pt with memory deficits and very forgetful.  Had to be reminded multiple times that pt was in the hospital and why d/t pt frequently asking.  -BA       Row Name 06/23/25 0912          Safety Issues/Impairments Affecting Functional Mobility    Safety Issues Affecting Function (Mobility) awareness of need for assistance;insight into deficits/self-awareness;judgment;problem-solving;safety precaution awareness;safety precautions follow-through/compliance;sequencing abilities;ability to follow commands  -     Impairments Affecting Function (Mobility) balance;cognition;coordination;endurance/activity tolerance;grasp;motor control;motor planning;postural/trunk control;range of motion (ROM);strength  -     Cognitive Impairments, Mobility Safety/Performance awareness, need for assistance;insight into deficits/self-awareness;judgment;problem-solving/reasoning;safety precaution awareness;safety precaution follow-through;sequencing abilities  -     Comment, Safety Issues/Impairments (Mobility) Pt pleasant and with cognitive/memory deficits; forgetful.  Difficulty with command following at times.  -               User Key  (r) = Recorded By, (t) = Taken By, (c) = Cosigned By      Initials Name Provider Type    Coco Gaitan, PT Physical Therapist                   Mobility       Row Name 06/23/25 0926          Bed Mobility    Bed Mobility rolling left;rolling right  -     Rolling Left [place] (Bed Mobility) maximum assist (25%  patient effort);2 person assist;verbal cues;nonverbal cues (demo/gesture)  -     Rolling Right [place] (Bed Mobility) maximum assist (25% patient effort);2 person assist;verbal cues;nonverbal cues (demo/gesture)  -     Assistive Device (Bed Mobility) bed rails;repositioning sheet  -BA     Comment, (Bed Mobility) Increased time and effort.  Rolling to both L and R side for lift sling placement.  VCs/TCs for sequencing and hand placement.  Pt assisted slightly more with rolling toward L side.  -       Row Name 06/23/25 0926          Bed-Chair Transfer    Bed-Chair [place] (Transfers) dependent (less than 25% patient effort);2 person assist;verbal cues;nonverbal cues (demo/gesture)  -     Assistive Device (Bed-Chair Transfers) lift device  -     Comment, (Bed-Chair Transfer) Good toleration of mechanical lift from bed to chair.  VCs/TCs for sequencing and hand placement.  -       Row Name 06/23/25 0926          Sit-Stand Transfer    Sit-Stand [place] (Transfers) unable to assess  -     Comment, (Sit-Stand Transfer) Deferred d/t fatigue, weakness, decreased activity tolerance, BLE contractures, and pt declining attempt at standing.  Per medical chart, pt is nonambulatory at baseline and uses kandi lift for w/c t/f's.  -       Row Name 06/23/25 0946          Gait/Stairs (Locomotion)    [place] Level (Gait) unable to assess  -     Patient was able to Ambulate no, other medical factors prevent ambulation  -     Reason Patient was unable to Ambulate Non-Ambulatory at Baseline  -               User Key  (r) = Recorded By, (t) = Taken By, (c) = Cosigned By      Initials Name Provider Type    Coco Gaitan, PT Physical Therapist                   Obj/Interventions       Row Name 06/23/25 0934          Range of Motion Comprehensive    General Range of Motion lower extremity range of motion deficits identified  -     Comment, General Range of Motion Minimal AROM BLE d/t  weakness and tightness.  AAROM/PROM B hip decreased, but WFL.  AAROM B knee flexion WFL.  Decreased AAROM/PROM B knee extension, R worse than L, with B hamstring muscle tightness.  Mild B knee flexion contractures noted with L knee extension to lacking ~15 degrees from neutral and R knee extension to lacking ~35 degrees from neutral.  Decreased AAROM B ankle DF with mild/mod B ankle PF contractures noted.  Pt tends to rest with B hips shifted toward R side and BLE also drifted and rotated toward the R side.  -       Row Name 06/23/25 0934          Strength Comprehensive (MMT)    General Manual Muscle Testing (MMT) Assessment lower extremity strength deficits identified  -     Comment, General Manual Muscle Testing (MMT) Assessment Pt appeared to have some difficulty at times with command following for BLE MMT.  Grossly B hip flex with SLR 0/5, L knee ext 1/5, L knee flex 3+/5, R knee ext 0/5, R knee flex 3-/5, B ankle 2+/5.  -       Row Name 06/23/25 0934          Motor Skills    Motor Skills coordination;functional endurance  -     Coordination gross motor deficit;bilateral;lower extremity;moderate impairment;severe impairment  -     Functional Endurance Decreased functional endurance.  Easily fatigues with activity.  -       Row Name 06/23/25 0934          Balance    Balance Assessment sitting static balance;sitting dynamic balance  -     Static Sitting Balance maximum assist;2-person assist;verbal cues  -     Dynamic Sitting Balance maximum assist;2-person assist;verbal cues  -     Position, Sitting Balance unsupported;sitting in chair  -     Comment, Balance Required maxAx2 to pull self forward to be able to place pillows behind pt's back for comfort/positioning while sitting in chair.  -       Row Name 06/23/25 0934          Sensory Assessment (Somatosensory)    Sensory Assessment (Somatosensory) LE sensation intact  -               User Key  (r) = Recorded By, (t) = Taken By, (c) =  Cosigned By      Initials Name Provider Type    BA Coco Lazaro, PT Physical Therapist                   Goals/Plan    No documentation.                  Clinical Impression       Row Name 06/23/25 0955          Pain    Pretreatment Pain Rating 0/10 - no pain  -BA     Posttreatment Pain Rating 0/10 - no pain  -       Row Name 06/23/25 0955          Plan of Care Review    Plan of Care Reviewed With patient  -BA     Outcome Evaluation PT initial Eval completed.  Pt appears to present at baseline level of function along with decreased activity tolerance, mild BLE contractures, cognitive/memory deficits, and difficulty with command following at times.  Pt with poor rehab potential and no skilled IP PT needs at this time.  PT will sign off.  Rec return to [facility] upon d/c.  -BA       Row Name 06/23/25 0955          Therapy Assessment/Plan (PT)    Patient/Family Therapy Goals Statement (PT) none reported; stated does not like to walk and prefers to stay in bed most of the day.  -BA     Rehab Potential (PT) poor  -BA     Criteria for Skilled Interventions Met (PT) no;does not meet criteria for skilled intervention  -     Therapy Frequency (PT) evaluation only  -BA     Predicted Duration of Therapy Intervention (PT) evaluation only  -BA       Row Name 06/23/25 0955          Vital Signs    Pre Systolic BP Rehab 105  -BA     Pre Treatment Diastolic BP 54  -BA     Post Systolic BP Rehab 100  -BA     Post Treatment Diastolic BP 49  -BA     Pretreatment Heart Rate (beats/min) 73  -BA     Posttreatment Heart Rate (beats/min) 71  -BA     Pre SpO2 (%) 92  -BA     O2 Delivery Pre Treatment room air  -BA     O2 Delivery Intra Treatment room air  -BA     Post SpO2 (%) 95  -BA     O2 Delivery Post Treatment room air  -BA     Pre Patient Position Supine  -BA     Post Patient Position Sitting  -BA       Row Name 06/23/25 0955          Positioning and Restraints    Pre-Treatment Position in bed  -BA     Post Treatment Position chair   -BA     In Chair notified nsg;reclined;call light within reach;encouraged to call for assist;exit alarm on;with OT;RUE elevated;LUE elevated;waffle cushion;on mechanical lift sling;legs elevated;heels elevated;pillow between legs  -               User Key  (r) = Recorded By, (t) = Taken By, (c) = Cosigned By      Initials Name Provider Type    Coco Gaitan, GEMMA Physical Therapist                   Outcome Measures       Row Name 06/23/25 1003          How much help from another person do you currently need...    Turning from your back to your side while in flat bed without using bedrails? 2  -BA     Moving from lying on back to sitting on the side of a flat bed without bedrails? 1  -BA     Moving to and from a bed to a chair (including a wheelchair)? 1  -BA     Standing up from a chair using your arms (e.g., wheelchair, bedside chair)? 1  -BA     Climbing 3-5 steps with a railing? 1  -BA     To walk in hospital room? 1  -BA     AM-PAC 6 Clicks Score (PT) 7  -BA     Highest Level of Mobility Goal Bed Activities/Dependent Transfer-2  -BA       Row Name 06/23/25 1003          Functional Assessment    Outcome Measure Options AM-PAC 6 Clicks Basic Mobility (PT)  -               User Key  (r) = Recorded By, (t) = Taken By, (c) = Cosigned By      Initials Name Provider Type    Coco Gaitan, PT Physical Therapist                  Physical Therapy Education       Title: PT OT SLP Therapies (In Progress)       Topic: Physical Therapy (In Progress)       Point: Mobility training (In Progress)       Learning Progress Summary            Patient Acceptance, E, NR,NL by CORTEZ at 6/23/2025 1003                      Point: Home exercise program (Not Started)       Learner Progress:  Not documented in this visit.              Point: Body mechanics (In Progress)       Learning Progress Summary            Patient Acceptance, E, NR,NL by CORTEZ at 6/23/2025 1003                      Point: Precautions (In Progress)        Learning Progress Summary            Patient Acceptance, E, NR,NL by  at 6/23/2025 1003                                      User Key       Initials Effective Dates Name Provider Type Discipline     09/21/21 -  Coco Lazaro, GEMMA Physical Therapist PT                  PT Recommendation and Plan     Outcome Evaluation: PT initial Eval completed.  Pt appears to present at baseline level of function along with decreased activity tolerance, mild BLE contractures, cognitive/memory deficits, and difficulty with command following at times.  Pt with poor rehab potential and no skilled IP PT needs at this time.  PT will sign off.  Rec return to [facility] upon d/c.     Time Calculation:   PT Evaluation Complexity  History, PT Evaluation Complexity: 3 or more personal factors and/or comorbidities  Examination of Body Systems (PT Eval Complexity): total of 3 or more elements  Clinical Presentation (PT Evaluation Complexity): evolving  Clinical Decision Making (PT Evaluation Complexity): moderate complexity  Overall Complexity (PT Evaluation Complexity): moderate complexity     PT Charges       Row Name 06/23/25 1004             Time Calculation    Start Time 0825  -BA      PT Received On 06/23/25  -BA         Untimed Charges    PT Eval/Re-eval Minutes 79  -BA         Total Minutes    Untimed Charges Total Minutes 79  -BA       Total Minutes 79  -BA                User Key  (r) = Recorded By, (t) = Taken By, (c) = Cosigned By      Initials Name Provider Type     Coco Lazaro, GEMMA Physical Therapist                  Therapy Charges for Today       Code Description Service Date Service Provider Modifiers Qty    [id number] HC-PT EVAL MOD COMPLEXITY 5 6/23/2025 Coco Lazaro, PT  1            PT G-Codes  Outcome Measure Options: AM-PAC 6 Clicks Basic Mobility (PT)  AM-PAC 6 Clicks Score (PT): 7    PT Discharge Summary  Anticipated Discharge Disposition (PT): extended care facility  Reason for Discharge: At baseline  function    Coco Lazaro, PT  6/23/2025

## 2025-06-23 NOTE — PROGRESS NOTES
Nutrition Services    Patient Name:  Lu Hou  YOB: 1924  MRN: [id number]  Admit Date:  6/21/2025    Pt screened per protocol for potential pressure injury. WOC consult pending. RD will complete nutrition assessment with identification of Stg II PI or greater. Will continue to monitor per protocol. Please consult with urgent nutrition related needs. Thanks.    Electronically signed by:  Deann Grider RD  06/23/25 13:21 EDT

## 2025-06-23 NOTE — NURSING NOTE
Patient presents with the tiniest of tiny open areas on the sacrum.    Area is roughly 0.4 x 0.6 cm x 0.01.    Area is pink dermis.  All other skin is intact and within defined limits.    For this we will just cleanse with saline daily and keep that covered with silicone foam try to maintain the silicone foam for as many days as possible.    Offload with pillows or wedge.    Make sure you are keeping the heels off the bed.    This was POA.

## 2025-06-23 NOTE — CASE MANAGEMENT/SOCIAL WORK
Case Management Discharge Note      Final Note: RN will need to call report to [facility] at [phone]. [facility] is the facility pharmacy. PCS hard copy on chart and sent to the EMS dropbox. CM will dispatch EMS when report has been called and patient is ready to leave.         Selected Continued Care - Admitted Since 6/21/2025       Destination Coordination complete.      Service Provider Services Address Phone Fax Patient Preferred    [facility] [address] [facility] [id number] [phone] --              Durable Medical Equipment    No services have been selected for the patient.                Dialysis/Infusion    No services have been selected for the patient.                Home Medical Care    No services have been selected for the patient.                Therapy    No services have been selected for the patient.                Community Resources    No services have been selected for the patient.                Community & DME    No services have been selected for the patient.                    Transportation Services  Ambulance: [facility]    Final Discharge Disposition Code: 04 - intermediate care facility

## 2025-06-23 NOTE — PLAN OF CARE
Goal Outcome Evaluation:  Plan of Care Reviewed With: patient        Progress: no change  Outcome Evaluation: OT eval complete. Pt presents at functional baseline for ADLs and mobility. No further skilled OT services are warranted at this time. Rec d/c to [facility].    Anticipated Discharge Disposition (OT): extended care facility

## 2025-06-23 NOTE — DISCHARGE SUMMARY
"    [facility] Medicine Services  DISCHARGE SUMMARY    Patient Name: Lu Hou  : 1924  MRN: [id number]    Date of Admission: 2025  1:49 PM  Date of Discharge: 2025  Primary Care Physician: Jamal Graham MD    Consults       No orders found from 2025 to 2025.            Hospital Course     Presenting Problem: Aspiration pneumonia    Active Hospital Problems    Diagnosis  POA    **Aspiration pneumonia [J69.0]  Yes    Aspiration into airway [T17.908A]  Yes    Advanced age [R54]  Yes    Macular degeneration, wet [H35.3290]  Yes    History of stroke [Z86.73]  Not Applicable    Essential hypertension [I10]  Yes      Resolved Hospital Problems   No resolved problems to display.          Hospital Course:  Lu Hou is a 89 y.o. female who is a resident at [facility] for the last 3 years.  Patient with past medical history of HTN, HLD, wet macular degeneration, and ? Remote CVA with residual mild right-sided weakness.  Nonambulatory at baseline.  Requires feeding assist due to right-handed.  Patient had a witnessed choking episode today approximately 30 minutes before ER presentation.  Did not require Heimlich maneuver.  On arrival sats 92-94% on room air.  Labs WNL.  CT chest completed.  Showed right middle lobe airspace disease (aspiration versus pneumonia), and right lower lobe wall thickening with partial fluid-filled bronchi.  Due to advanced age, debility, and aspiration event will admit to hospital medicine service for observation.  Will start Augmentin and azithromycin,and nebs PRN.  Nursing bedside swallow.  Patient and son elected for a \"comfort diet\" as she states she is 89 and prefers to eat dinner tonight.  Followed by speech.     Witnessed aspiration event  Right LLL wall thickening and partially fluid-filled bronchi  RML airspace disease/possible pneumonia  Witnessed at SNF.  No Heimlich maneuver required.  " Remains on room air with sats 92-95%.  CT chest shows right LLL wall thickening with partial fluid-filled bronchi, right mL airspace disease aspiration VS pneumonia  Patient is on comfort diet, discussed with son, does not have capacity as she is only oriented to self, discussed DNR/DNI, he agreed to change CODE STATUS to DNR/DNI.  SLP consult, s/p evaluation, mild oral dysphagia  SLP Diet Recommendation: soft to chew textures, chopped, thin liquids   Recommended Precautions and Strategies: upright posture during/after eating, small bites of food and sips of liquid, general aspiration precautions   S/p Augmentin and azithromycin, s/p Rocephin azithromycin, 1 additional day of azithromycin p.o. and 3 days of cefdinir at discharge  MRSA swab positive however low likelihood of active MRSA pneumonia  Oxygen/nebs as needed  feeder at baseline      HTN/HLD  home meds as tolerated      Hx? CVA w residual right-sided weak > Lt   data deficit  non ambulatory.  Up to W/c with lift  gen weakness, Rt>Lt  Right-handed.  Requires feeding assist     Wet macular degeneration  Chronic lt lower lid ptosis   follow with optho, takes 'shots'.  No regular gtts   eye natural tears prn      Chronic debility  Resident of SNF x 3 yrs   Non ambulatory   Patient to be discharged back to [facility], stable for discharge  PT/OT, pending evaluation  TQ2      Discharge Follow Up Recommendations for outpatient labs/diagnostics:  Follow-up PCP in 1 week    Day of Discharge     HPI:   Patient doing very well this a.m., satting well on room air, seems very pleasant, not agitated.  Is oriented to self, no complaints otherwise    Review of Systems  Dementia    Vital Signs:   Temp:  [97.3 °F (36.3 °C)-97.7 °F (36.5 °C)] 97.5 °F (36.4 °C)  Heart Rate:  [67-88] 81  Resp:  [16-18] 16  BP: ()/(50-84) 97/51      Physical Exam:  Constitutional: No acute distress, awake, alert  HENT: NCAT, mucous membranes moist  Respiratory: Clear to auscultation  bilaterally, respiratory effort normal   Cardiovascular: RRR, no murmurs, rubs, or gallops  Gastrointestinal: Positive bowel sounds, soft, nontender, nondistended  Musculoskeletal: No bilateral ankle edema  Psychiatric: Appropriate affect, cooperative  Neurologic: Oriented x 1, strength symmetric in all extremities, Cranial Nerves grossly intact to confrontation, speech clear  Skin: No rashes     Pertinent  and/or Most Recent Results     LAB RESULTS:      Lab 06/23/25  0429 06/22/25  0458 06/21/25  1458 06/21/25  1401   WBC 7.76 7.67  --  5.98   HEMOGLOBIN 11.4* 11.4*  --  12.7   HEMATOCRIT 35.1 35.0  --  38.2   PLATELETS 238 246  --  256   NEUTROS ABS  --  5.36  --  3.20   IMMATURE GRANS (ABS)  --  0.04  --  0.05   LYMPHS ABS  --  1.41  --  1.75   MONOS ABS  --  0.70  --  0.65   EOS ABS  --  0.13  --  0.28   MCV 89.3 89.1  --  89.0   CRP  --  0.54*  --   --    PROCALCITONIN  --  0.06  --   --    LACTATE  --   --  1.5  --          Lab 06/23/25  0429 06/22/25  0458 06/21/25  1458   SODIUM 137 137 138   POTASSIUM 5.0 4.5 4.6   CHLORIDE 103 102 103   CO2 25.0 23.0 25.0   ANION GAP 9.0 12.0 10.0   BUN 20.0 21.3 16.6   CREATININE 0.48* 0.42* 0.54*   EGFR 84.1 86.9 81.8   GLUCOSE 99 98 154*   CALCIUM 8.6 8.6 8.8   MAGNESIUM 2.0  --   --          Lab 06/21/25  1458   TOTAL PROTEIN 7.1   ALBUMIN 3.5   GLOBULIN 3.6   ALT (SGPT) 20   AST (SGOT) 27   BILIRUBIN 0.3   ALK PHOS 85                     Brief Urine Lab Results       None          Microbiology Results (last 10 days)       Procedure Component Value - Date/Time    MRSA Screen, PCR (Inpatient) - Swab, Nares [[id number]]  (Abnormal) Collected: 06/21/25 3945    Lab Status: Final result Specimen: Swab from Nares Updated: 06/22/25 0847     MRSA PCR Positive    Narrative:      The negative predictive value of this diagnostic test is high and should only be used to consider de-escalating anti-MRSA therapy. A positive result may indicate colonization with MRSA and must be  correlated clinically.    COVID PRE-OP / PRE-PROCEDURE SCREENING ORDER (NO ISOLATION) - Swab, Nasopharynx [[id number]]  (Normal) Collected: 06/21/25 1456    Lab Status: Final result Specimen: Swab from Nasopharynx Updated: 06/21/25 1622    Narrative:      The following orders were created for panel order COVID PRE-OP / PRE-PROCEDURE SCREENING ORDER (NO ISOLATION) - Swab, Nasopharynx.  Procedure                               Abnormality         Status                     ---------                               -----------         ------                     COVID-19, FLU A/B, RSV P...[[id number]]  Normal              Final result                 Please view results for these tests on the individual orders.    COVID-19, FLU A/B, RSV PCR 1 HR TAT - Swab, Nasopharynx [[id number]]  (Normal) Collected: 06/21/25 1456    Lab Status: Final result Specimen: Swab from Nasopharynx Updated: 06/21/25 1622     COVID19 Not Detected     Influenza A PCR Not Detected     Influenza B PCR Not Detected     RSV, PCR Not Detected    Narrative:      Fact sheet for providers: https://www.fda.gov/media/509260/download    Fact sheet for patients: https://www.fda.gov/media/421416/download    Test performed by PCR.            CT Chest Without Contrast Diagnostic  Result Date: 6/21/2025  CT CHEST WO CONTRAST DIAGNOSTIC, CT SOFT TISSUE NECK WO CONTRAST Date of Exam: 6/21/2025 3:14 PM EDT Indication: cough and rhonchorous breath sounds after witnessed aspiration event. Comparison: None available. Technique: Axial CT images were obtained of the neck and chest without contrast administration.  Reconstructed coronal and sagittal images were also obtained. Automated exposure control and iterative construction methods were used. Findings: CT SCAN OF THE NECK SOFT TISSUES WITHOUT CONTRAST: No acute abnormalities are seen of the included base of the brain. Included paranasal sinuses and mastoids appear clear. Cervical airway appears widely patent. No airway  foreign body is seen. No enlargement of the adenoids or tonsils is seen. Epiglottis appears normal. Prevertebral soft tissues appear normal. No cervical adenopathy, mass or acute inflammatory change is identified.     Neck soft tissues appear within normal limits. No evidence of cervical airway stenosis or soft tissue inflammation. CT SCAN OF CHEST WITHOUT CONTRAST: What appears to be an irregular, almost rectangular shaped nodule on axial image 45/2 in the right mid lung is seen as a focal area of discoid atelectasis paralleling the minor fissure on coronal images, specifically coronal image 63/900. This measures 20 x 12 x 5 mm in maximal dimensions. There are mild chronic appearing interstitial changes in the upper and mid lungs. In the lower lungs, there is thickening of multiple right lower lobe bronchi, mild associated interstitial change, and focal right middle lobe airspace opacity adjacent the right hemidiaphragm, that may represent aspiration or small foci of pneumonia. Lungs elsewhere appear clear. Images the mediastinum show calcified gallbladder fundus, and either partially calcifying inspissated debris in the gallbladder from nonfunctioning gallbladder or tightly packed partially calcified gallstones. No inflammatory change or invasive mass is seen. No acute upper abdominal pathology is seen elsewhere. There is an old, smooth margined vertebral compression deformity of T4. There is an old healed upper sternal fracture. IMPRESSION: 1. Right lower lobe thick-walled, partially fluid-filled bronchi, whether mild aspiration or bronchitis. Focal right middle lobe airspace disease that may represent aspiration or small focus of pneumonia. 2. Plaque-like density adjacent the minor fissure in the right upper lobe. This resembles a pulmonary nodule on axial imaging but is seen as a thin smooth scarlike plaque on coronal imaging, likely benign. Continued imaging follow-up is suggested. 3. Calcified gallbladder  findings, and calcified matrix like appearance of the material in the gallbladder lumen, without evidence of either inflammation or invasive mass. Findings may be related to longstanding gallbladder dysfunction. Electronically Signed: Andrae Pfeiffer MD  6/21/2025 3:55 PM EDT  Workstation ID: [id number]    CT Soft Tissue Neck Without Contrast  Result Date: 6/21/2025  CT CHEST WO CONTRAST DIAGNOSTIC, CT SOFT TISSUE NECK WO CONTRAST Date of Exam: 6/21/2025 3:14 PM EDT Indication: cough and rhonchorous breath sounds after witnessed aspiration event. Comparison: None available. Technique: Axial CT images were obtained of the neck and chest without contrast administration.  Reconstructed coronal and sagittal images were also obtained. Automated exposure control and iterative construction methods were used. Findings: CT SCAN OF THE NECK SOFT TISSUES WITHOUT CONTRAST: No acute abnormalities are seen of the included base of the brain. Included paranasal sinuses and mastoids appear clear. Cervical airway appears widely patent. No airway foreign body is seen. No enlargement of the adenoids or tonsils is seen. Epiglottis appears normal. Prevertebral soft tissues appear normal. No cervical adenopathy, mass or acute inflammatory change is identified.     Neck soft tissues appear within normal limits. No evidence of cervical airway stenosis or soft tissue inflammation. CT SCAN OF CHEST WITHOUT CONTRAST: What appears to be an irregular, almost rectangular shaped nodule on axial image 45/2 in the right mid lung is seen as a focal area of discoid atelectasis paralleling the minor fissure on coronal images, specifically coronal image 63/900. This measures 20 x 12 x 5 mm in maximal dimensions. There are mild chronic appearing interstitial changes in the upper and mid lungs. In the lower lungs, there is thickening of multiple right lower lobe bronchi, mild associated interstitial change, and focal right middle lobe airspace opacity adjacent  the right hemidiaphragm, that may represent aspiration or small foci of pneumonia. Lungs elsewhere appear clear. Images the mediastinum show calcified gallbladder fundus, and either partially calcifying inspissated debris in the gallbladder from nonfunctioning gallbladder or tightly packed partially calcified gallstones. No inflammatory change or invasive mass is seen. No acute upper abdominal pathology is seen elsewhere. There is an old, smooth margined vertebral compression deformity of T4. There is an old healed upper sternal fracture. IMPRESSION: 1. Right lower lobe thick-walled, partially fluid-filled bronchi, whether mild aspiration or bronchitis. Focal right middle lobe airspace disease that may represent aspiration or small focus of pneumonia. 2. Plaque-like density adjacent the minor fissure in the right upper lobe. This resembles a pulmonary nodule on axial imaging but is seen as a thin smooth scarlike plaque on coronal imaging, likely benign. Continued imaging follow-up is suggested. 3. Calcified gallbladder findings, and calcified matrix like appearance of the material in the gallbladder lumen, without evidence of either inflammation or invasive mass. Findings may be related to longstanding gallbladder dysfunction. Electronically Signed: Andrea Pfeiffer MD  6/21/2025 3:55 PM EDT  Workstation ID: [id number]              Results for orders placed during the hospital encounter of 12/15/22    Adult Transthoracic Echo Complete W/ Cont if Necessary Per Protocol (With Agitated Saline)    Interpretation Summary    Left ventricular systolic function is normal. Left ventricular ejection fraction appears to be 66 - 70%.    Left ventricular wall thickness is consistent with mild septal asymmetric hypertrophy.    Left ventricular diastolic function is consistent with (grade Ia w/high LAP) impaired relaxation.    Left atrial volume is mildly increased.    Mild aortic valve regurgitation is present.    Mild mitral annular  calcification is present. There is mild calcification of the mitral valve anterior leaflet      Plan for Follow-up of Pending Labs/Results:     Discharge Details        Discharge Medications        New Medications        Instructions Start Date   azithromycin 500 MG tablet  Commonly known as: Zithromax   500 mg, Oral, Daily   Start Date: June 24, 2025     benzonatate 100 MG capsule  Commonly known as: TESSALON   100 mg, Oral, 3 Times Daily PRN      cefdinir 300 MG capsule  Commonly known as: OMNICEF   300 mg, Oral, 2 Times Daily   Start Date: June 24, 2025     guaifenesin 100 MG/5ML liquid  Commonly known as: ROBITUSSIN   200 mg, Oral, 3 Times Daily PRN             Continue These Medications        Instructions Start Date   aspirin 325 MG EC tablet   325 mg, Oral, Daily      atorvastatin 40 MG tablet  Commonly known as: LIPITOR   40 mg, Oral, Nightly      Garlic 100 MG tablet   100 mg, Oral, Daily      lidocaine 5 %  Commonly known as: LIDODERM   1 patch, Transdermal, Every 24 Hours Scheduled, Remove & Discard patch within 12 hours or as directed by MD      melatonin 5 MG tablet tablet   5 mg, Oral, Nightly PRN      multivitamin with minerals tablet tablet   1 tablet, Oral, 2 Times Daily      Tylenol 325 MG tablet  Generic drug: acetaminophen   650 mg, Oral, Every 6 Hours PRN             Stop These Medications      lisinopril 10 MG tablet  Commonly known as: PRINIVIL,ZESTRIL              No Known Allergies      Discharge Disposition:  Home or Self Care    Diet:  Hospital:  Diet Order   Procedures    Diet: Regular/House; Feeding Assistance - Nursing; Texture: Soft to Chew (NDD 3); Soft to Chew: Chopped Meat; Fluid Consistency: Thin (IDDSI 0)            Activity:      Restrictions or Other Recommendations:         CODE STATUS:    Code Status and Medical Interventions: No CPR (Do Not Attempt to Resuscitate); Limited Support; No intubation (DNI)   Ordered at: 06/23/25 1057     Code Status (Patient has no pulse and is  not breathing):    No CPR (Do Not Attempt to Resuscitate)     Medical Interventions (Patient has pulse or is breathing):    Limited Support     Medical Intervention Limits:    No intubation (DNI)     Level Of Support Discussed With:    Patient       No future appointments.    Additional Instructions for the Follow-ups that You Need to Schedule       Discharge Follow-up with PCP   As directed       Currently Documented PCP:    Jamal Graham MD    PCP Phone Number:    [phone]     Follow Up Details: 1 week                      Emily Neely MD  06/23/25      Time Spent on Discharge:  I spent  45  minutes on this discharge activity which included: face-to-face encounter with the patient, reviewing the data in the system, coordination of the care with the nursing staff as well as consultants, documentation, and entering orders.

## 2025-06-23 NOTE — SIGNIFICANT NOTE
Conversation with son who is ANGIE Hou, discussed CODE STATUS given comorbidities, age, previous DNR/DNI.  He agreed to switch CODE STATUS to DNR/DNI, patient is not oriented to time or place only to self

## 2025-06-23 NOTE — THERAPY EVALUATION
Patient Name: Lu Hou  : 1924    MRN: [id number]                              Today's Date: 2025       Admit Date: 2025    Visit Dx:     ICD-10-CM ICD-9-CM   1. Aspiration into airway, initial encounter  T17.908A 934.9   2. Essential hypertension  I10 401.9   3. History of stroke  Z86.73 V12.54   4. Aspiration pneumonia of right lower lobe, unspecified aspiration pneumonia type  J69.0 507.0   5. Advanced age  R54 797     Patient Active Problem List   Diagnosis    Right sided weakness    Essential hypertension    Generalized weakness    History of stroke    Aspiration into airway    Advanced age    Aspiration pneumonia    Macular degeneration, wet     Past Medical History:   Diagnosis Date    HLD (hyperlipidemia)     Hypertension     Macular degeneration, wet 2025    Right sided weakness      History reviewed. No pertinent surgical history.   General Information       Row Name 25 0943          OT Time and Intention    Document Type discharge evaluation/summary  -     Mode of Treatment occupational therapy  -       Row Name 25 0943          General Information    Patient Profile Reviewed yes  -AJ     Prior Level of Function dependent:;transfer;w/c or scooter;bed mobility;ADL's  Pt is a poor historian, no family at bedside. Per chart review she is non-ambulatory/bedbound and dependent for ADLs at baseline.  -AJ     Existing Precautions/Restrictions fall;other (see comments)  Confusion, prior CVA with residual R side weakness  -AJ     Barriers to Rehab medically complex;previous functional deficit;cognitive status  -       Row Name 25 0943          Living Environment    Current Living Arrangements extended care facility  -AJ     People in Home facility resident  -       Row Name 25 0943          Home Main Entrance    Number of Stairs, Main Entrance none  -       Row Name 25 0943          Stairs Within Home, Primary    Number of Stairs, Within Home,  Primary none  -       Row Name 06/23/25 0943          Cognition    Orientation Status (Cognition) oriented to;person;disoriented to;place;situation;time  -       Row Name 06/23/25 0943          Safety Issues/Impairments Affecting Functional Mobility    Safety Issues Affecting Function (Mobility) awareness of need for assistance;insight into deficits/self-awareness;judgment;problem-solving;safety precaution awareness;safety precautions follow-through/compliance;sequencing abilities;friction/shear risk  -     Impairments Affecting Function (Mobility) balance;cognition;coordination;endurance/activity tolerance;grasp;motor control;motor planning;postural/trunk control;range of motion (ROM);strength  -     Cognitive Impairments, Mobility Safety/Performance awareness, need for assistance;safety precaution follow-through;sequencing abilities;insight into deficits/self-awareness;judgment;problem-solving/reasoning;safety precaution awareness  -               User Key  (r) = Recorded By, (t) = Taken By, (c) = Cosigned By      Initials Name Provider Type    AJ Dee Newman OT Occupational Therapist                     Mobility/ADL's       Row Name 06/23/25 0948          Bed Mobility    Bed Mobility rolling left;rolling right  -     Rolling Left [place] (Bed Mobility) maximum assist (25% patient effort);2 person assist;verbal cues;nonverbal cues (demo/gesture)  -AJ     Rolling Right [place] (Bed Mobility) maximum assist (25% patient effort);2 person assist;verbal cues;nonverbal cues (demo/gesture)  -     Assistive Device (Bed Mobility) bed rails;repositioning sheet  -AJ     Comment, (Bed Mobility) Cues and significant assist for rolling for sling placement.  -       Row Name 06/23/25 0948          Transfers    Transfers bed-chair transfer  -AJ     Comment, (Transfers) STS not appropriate d/t weakness and BLE contracture.  -       Row Name 06/23/25 0948          Bed-Chair Transfer    Bed-Chair  [place] (Transfers) dependent (less than 25% patient effort);2 person assist;verbal cues;nonverbal cues (demo/gesture)  -     Assistive Device (Bed-Chair Transfers) lift device  -       Row Name 06/23/25 0948          Functional Mobility    Patient was able to Ambulate no, other medical factors prevent ambulation  -     Reason Patient was unable to Ambulate Non-Ambulatory at Baseline  -[facility] Name 06/23/25 0948          Activities of Daily Living    BADL Assessment/Intervention feeding  -[facility] Name 06/23/25 0948          Self-Feeding Assessment/Training    [place] Level (Feeding) scoop food and bring to mouth;supervision;verbal cues  -     Assistive Devices (Feeding) built-up handle utensils  -     Self-Feeding Assess/Train, Spillage Amount minimal  -     Position (Feeding) supported sitting  -     [place], (Feeding) Implemented and educated on use of built up utensils for self feeding, able to scoop oatmeal to mouth with minimal spillage.  -               User Key  (r) = Recorded By, (t) = Taken By, (c) = Cosigned By      Initials Name Provider Type    Dee Mireles OT Occupational Therapist                   Obj/Interventions       Row Name 06/23/25 1038          Sensory Assessment (Somatosensory)    Sensory Assessment (Somatosensory) UE sensation intact  -[facility] Name 06/23/25 1038          Range of Motion Comprehensive    General Range of Motion upper extremity range of motion deficits identified  -     [place], General Range of Motion Limited active flexion of B digits, able to [id number] PROM. Limited flexion in B shoudlers.  -[facility] Name 06/23/25 1038          Strength Comprehensive (MMT)    General Manual Muscle Testing (MMT) Assessment upper extremity strength deficits identified  -     Comment, General Manual Muscle Testing (MMT) Assessment Difficult to formally assess- BUE grossly +3/5 based on bed mobility  -       Row Name 06/23/25 1038           Motor Skills    Motor Skills coordination  -       Row Name 06/23/25 1038          Balance    Balance Assessment sitting static balance;sitting dynamic balance  -AJ     Static Sitting Balance maximum assist;2-person assist;verbal cues  -AJ     Dynamic Sitting Balance maximum assist;2-person assist;verbal cues  -AJ     Position, Sitting Balance unsupported;sitting in chair  -AJ     Balance Interventions sitting;standing;static;dynamic;occupation based/functional task  -AJ               User Key  (r) = Recorded By, (t) = Taken By, (c) = Cosigned By      Initials Name Provider Type    Dee Mireles, MARYANN Occupational Therapist                   Goals/Plan    No documentation.                  Clinical Impression       Row Name 06/23/25 1041          Pain Assessment    Pretreatment Pain Rating 0/10 - no pain  -AJ     Posttreatment Pain Rating 0/10 - no pain  -       Row Name 06/23/25 1041          Plan of Care Review    Plan of Care Reviewed With patient  -AJ     Progress no change  -     Outcome Evaluation OT eval complete. Pt presents at functional baseline for ADLs and mobility. No further skilled OT services are warranted at this time. Rec d/c to ECF.  -       Row Name 06/23/25 1041          Therapy Assessment/Plan (OT)    Rehab Potential (OT) poor  -     Criteria for Skilled Therapeutic Interventions Met (OT) no problems identified which require skilled intervention  -     Therapy Frequency (OT) evaluation only  -       Row Name 06/23/25 1041          Therapy Plan Review/Discharge Plan (OT)    Anticipated Discharge Disposition (OT) [facility] care facility  -       Row Name 06/23/25 1041          Vital Signs    Pre Systolic BP Rehab 105  -AJ     Pre Treatment Diastolic BP 54  -AJ     Post Systolic BP Rehab 100  -AJ     Post Treatment Diastolic BP 49  -AJ     Pretreatment Heart Rate (beats/min) 74  -AJ     Posttreatment Heart Rate (beats/min) 75  -AJ     Pre SpO2 (%) 92  -AJ     O2 Delivery Pre  Treatment room air  -AJ     Post SpO2 (%) 93  -AJ     O2 Delivery Post Treatment room air  -AJ     Pre Patient Position Supine  -AJ     Intra Patient Position Side Lying  -AJ     Post Patient Position Sitting  -AJ       Row Name 06/23/25 1041          Positioning and Restraints    Pre-Treatment Position in bed  -AJ     Post Treatment Position chair  -AJ     In Chair notified nsg;reclined;sitting;call light within reach;encouraged to call for assist;exit alarm on;legs elevated;heels elevated;RUE elevated;LUE elevated;waffle cushion;on mechanical lift sling;pillow between legs  -AJ               User Key  (r) = Recorded By, (t) = Taken By, (c) = Cosigned By      Initials Name Provider Type    Dee Mireles OT Occupational Therapist                   Outcome Measures       Row Name 06/23/25 1043          How much help from another is currently needed...    Putting on and taking off regular lower body clothing? 1  -AJ     Bathing (including washing, rinsing, and drying) 1  -AJ     Toileting (which includes using toilet bed pan or urinal) 1  -AJ     Putting on and taking off regular upper body clothing 1  -AJ     Taking care of personal grooming (such as brushing teeth) 2  -AJ     Eating meals 3  -AJ     AM-PAC 6 Clicks Score (OT) 9  -AJ       Row Name 06/23/25 1003          How much help from another person do you currently need...    Turning from your back to your side while in flat bed without using bedrails? 2  -BA     Moving from lying on back to sitting on the side of a flat bed without bedrails? 1  -BA     Moving to and from a bed to a chair (including a wheelchair)? 1  -BA     Standing up from a chair using your arms (e.g., wheelchair, bedside chair)? 1  -BA     Climbing 3-5 steps with a railing? 1  -BA     To walk in hospital room? 1  -BA     AM-PAC 6 Clicks Score (PT) 7  -BA     Highest Level of Mobility Goal Bed Activities/Dependent Transfer-2  -BA       Row Name 06/23/25 1043 06/23/25 1003        Functional Assessment    Outcome Measure Options AM-PAC 6 Clicks Daily Activity (OT)  -[facility]-PAC 6 Clicks Basic Mobility (PT)  -[facility]              User Key  (r) = Recorded By, (t) = Taken By, (c) = Cosigned By      Initials Name Provider Type    BA Coco Lazaro, PT Physical Therapist    Dee Mireles, OT Occupational Therapist                    Occupational Therapy Education       Title: PT OT SLP Therapies (In Progress)       Topic: Occupational Therapy (In Progress)       Point: ADL training (In Progress)       Learning Progress Summary            Patient Nonacceptance, E,D, NR,NL by  at 6/23/2025 1044                      Point: Precautions (In Progress)       Learning Progress Summary            Patient Nonacceptance, E,D, NR,NL by  at 6/23/2025 1044                      Point: Body mechanics (In Progress)       Learning Progress Summary            Patient Nonacceptance, E,D, NR,NL by  at 6/23/2025 1044                                      User Key       Initials Effective Dates Name Provider Type Discipline     08/26/24 -  Dee Newman, OT Occupational Therapist OT                  OT Recommendation and Plan  Therapy Frequency (OT): evaluation only  Plan of Care Review  Plan of Care Reviewed With: patient  Progress: no change  Outcome Evaluation: OT eval complete. Pt presents at functional baseline for ADLs and mobility. No further skilled OT services are warranted at this time. Rec d/c to ECF.     Time Calculation:   Evaluation Complexity (OT)  Review Occupational Profile/Medical/Therapy History Complexity: expanded/moderate complexity  Assessment, Occupational Performance/Identification of Deficit Complexity: 3-5 performance deficits  Clinical Decision Making Complexity (OT): detailed assessment/moderate complexity  Overall Complexity of Evaluation (OT): moderate complexity     Time Calculation- OT       Row Name 06/23/25 1045             Time Calculation- OT    OT Start Time 0830  -       OT Received On 06/23/25  -         Timed Charges    92881 - OT Self Care/Mgmt Minutes 14  -AJ         Untimed Charges    OT Eval/Re-eval Minutes 46  -AJ         Total Minutes    Timed Charges Total Minutes 14  -AJ      Untimed Charges Total Minutes 46  -AJ       Total Minutes 60  -AJ                User Key  (r) = Recorded By, (t) = Taken By, (c) = Cosigned By      Initials Name Provider Type    AJ Dee Newman OT Occupational Therapist                  Therapy Charges for Today       Code Description Service Date Service Provider Modifiers Qty    70438604268 HC OT SELF CARE/MGMT/TRAIN EA 15 MIN 6/23/2025 Dee Newman OT GO 1    [id number]  OT EVAL MOD COMPLEXITY 4 6/23/2025 Dee Newman OT GO 1                 Dee Newman OT  6/23/2025

## 2025-06-23 NOTE — CASE MANAGEMENT/SOCIAL WORK
Discharge Planning Assessment  [facility]     Patient Name: Lu Hou  MRN: [id number]  Today's Date: 6/23/2025    Admit Date: 6/21/2025    Plan: IDP   Discharge Needs Assessment       Row Name 06/23/25 1142       Living Environment    People in Home facility resident    Current Living Arrangements extended care facility    Potentially Unsafe Housing Conditions none       Transition Planning    Patient/Family Anticipates Transition to long-term care facility    Patient/Family Anticipated Services at Transition     Transportation Anticipated health plan transportation       Discharge Needs Assessment    Anticipated Changes Related to Illness inability to care for self    Discharge Facility/Level of Care Needs nursing facility, skilled    Current Discharge Risk dependent with mobility/activities of daily living;cognitively impaired                   Discharge Plan       Row Name 06/23/25 1143       Plan    Plan IDP    Plan Comments Patient confirmed to live at [facility]. Patient requires full care and is only oriented to self. [facility] provides all medical equipment she requires. Per Amanda, she can return to her bed anytime. Patient will need EMS at discharge. When patient is ready and report has been call, RN will need to notify CM. CM can dispatch EMS. [facility] faxed and hard copy on chart.    Final Discharge Disposition Code 04 - intermediate care facility                    Expected Discharge Date and Time       Expected Discharge Date Expected Discharge Time    Jun 23, 2025            Demographic Summary    No documentation.                  Functional Status       Row Name 06/23/25 1142       Functional Status    Usual Activity Tolerance poor    Current Activity Tolerance poor       Physical Activity    On average, how many days per week do you engage in moderate to strenuous exercise (like a brisk walk)? 0 days    On average, how many minutes do you engage in exercise at this  level? 0 min    Number of minutes of exercise per week 0       Assessment of Health Literacy    How often do you have someone help you read hospital materials? Always    How often do you have problems learning about your medical condition because of difficulty understanding written information? Always    How often do you have a problem understanding what is told to you about your medical condition? Always    How confident are you filling out medical forms by yourself? Not at all    Health Literacy Low       Functional Status, IADL    Medications completely dependent    Meal Preparation completely dependent    Housekeeping completely dependent    Laundry completely dependent    Shopping completely dependent    If for any reason you need help with day-to-day activities such as bathing, preparing meals, shopping, managing finances, etc., do you get the help you need? Patient unable to answer                   Psychosocial    No documentation.                  Abuse/Neglect    No documentation.                  Legal    No documentation.                  Substance Abuse    No documentation.                  Patient Forms    No documentation.                     Cinthya Arango RN

## 2025-06-23 NOTE — DISCHARGE PLACEMENT REQUEST
"Enma Ramos (89 y.o. Female)       Date of Birth   1924    Social Security Number       Address   [address] [facility] [address]    Home Phone   [phone]    MRN   [id number]       Lutheran   None    Marital Status                               Admission Date   2025    Admission Type   Emergency    Admitting Provider   Emily Neely MD    Attending Provider   Emily Neely MD    Department, Room/Bed   [facility] 4G, S454/1       Discharge Date       Discharge Disposition   Home or Self Care    Discharge Destination                                 Attending Provider: Emily Neely MD    Allergies: No Known Allergies    Isolation: None   Infection: MRSA (25)   Code Status: No CPR    Ht: 162.6 cm (64\")   Wt: 50.3 kg (110 lb 13.6 oz)    Admission Cmt: None   Principal Problem: Aspiration pneumonia [J69.0]                   Active Insurance as of 2025       Primary Coverage       Payor Plan Insurance Group Employer/Plan Group    [employer] MEDICARE REPLACEMENT [employer] PPO [id number]       Payor Plan Address Payor Plan Phone Number Payor Plan Fax Number Effective Dates    [address] [address] [phone]  2024 - None Entered    [facility] [address]         Subscriber Name Subscriber Birth Date Member ID       ENMA RAMOS 1924 [id number]                     Emergency Contacts        (Rel.) Home Phone Work Phone Mobile Phone    MELLIAS RAMOS (Son) [phone] -- [phone]                 Discharge Summary        Emily Neely MD at 25 1206              [facility] Medicine Services  DISCHARGE SUMMARY    Patient Name: Enma Ramos  : 1924  MRN: [id number]    Date of Admission: 2025  1:49 PM  Date of Discharge: 2025  Primary Care Physician: Jamal Graham MD    Consults       No orders found from 2025 to 2025.      " "      Hospital Course     Presenting Problem: Aspiration pneumonia    Active Hospital Problems    Diagnosis  POA    **Aspiration pneumonia [J69.0]  Yes    Aspiration into airway [T17.608A]  Yes    Advanced age [R54]  Yes    Macular degeneration, wet [H35.3290]  Yes    History of stroke [Z86.73]  Not Applicable    Essential hypertension [I10]  Yes      Resolved Hospital Problems   No resolved problems to display.          Hospital Course:  Lu Hou is a 89 y.o. female who is a resident at [facility] for the last 3 years.  Patient with past medical history of HTN, HLD, wet macular degeneration, and ? Remote CVA with residual mild right-sided weakness.  Nonambulatory at baseline.  Requires feeding assist due to right-handed.  Patient had a witnessed choking episode today approximately 30 minutes before ER presentation.  Did not require Heimlich maneuver.  On arrival sats 92-94% on room air.  Labs WNL.  CT chest completed.  Showed right middle lobe airspace disease (aspiration versus pneumonia), and right lower lobe wall thickening with partial fluid-filled bronchi.  Due to advanced age, debility, and aspiration event will admit to hospital medicine service for observation.  Will start Augmentin and azithromycin,and nebs PRN.  Nursing bedside swallow.  Patient and son elected for a \"comfort diet\" as she states she is 89 and prefers to eat dinner tonight.  Followed by speech.     Witnessed aspiration event  Right LLL wall thickening and partially fluid-filled bronchi  RML airspace disease/possible pneumonia  Witnessed at SNF.  No Heimlich maneuver required.  Remains on room air with sats 92-95%.  CT chest shows right LLL wall thickening with partial fluid-filled bronchi, right mL airspace disease aspiration VS pneumonia  Patient is on comfort diet, discussed with son, does not have capacity as she is only oriented to self, discussed DNR/DNI, he agreed to change CODE STATUS to " DNR/DNI.  SLP consult, s/p evaluation, mild oral dysphagia  SLP Diet Recommendation: soft to chew textures, chopped, thin liquids   Recommended Precautions and Strategies: upright posture during/after eating, small bites of food and sips of liquid, general aspiration precautions   S/p Augmentin and azithromycin, s/p Rocephin azithromycin, 1 additional day of azithromycin p.o. and 3 days of cefdinir at discharge  MRSA swab positive however low likelihood of active MRSA pneumonia  Oxygen/nebs as needed  feeder at baseline      HTN/HLD  home meds as tolerated      Hx? CVA w residual right-sided weak > Lt   data deficit  non ambulatory.  Up to W/c with lift  gen weakness, Rt>Lt  Right-handed.  Requires feeding assist     Wet macular degeneration  Chronic lt lower lid ptosis   follow with optho, takes 'shots'.  No regular gtts   eye natural tears prn      Chronic debility  Resident of SNF x 3 yrs   Non ambulatory   Patient to be discharged back to [facility], stable for discharge  PT/OT, pending evaluation  TQ2      Discharge Follow Up Recommendations for outpatient labs/diagnostics:  Follow-up PCP in 1 week    Day of Discharge     HPI:   Patient doing very well this a.m., satting well on room air, seems very pleasant, not agitated.  Is oriented to self, no complaints otherwise    Review of Systems  Dementia    Vital Signs:   Temp:  [97.3 °F (36.3 °C)-97.7 °F (36.5 °C)] 97.5 °F (36.4 °C)  Heart Rate:  [67-88] 81  Resp:  [16-18] 16  BP: ()/(50-84) 97/51      Physical Exam:  Constitutional: No acute distress, awake, alert  HENT: NCAT, mucous membranes moist  Respiratory: Clear to auscultation bilaterally, respiratory effort normal   Cardiovascular: RRR, no murmurs, rubs, or gallops  Gastrointestinal: Positive bowel sounds, soft, nontender, nondistended  Musculoskeletal: No bilateral ankle edema  Psychiatric: Appropriate affect, cooperative  Neurologic: Oriented x 1, strength symmetric in all extremities,  Cranial Nerves grossly intact to confrontation, speech clear  Skin: No rashes     Pertinent  and/or Most Recent Results     LAB RESULTS:      Lab 06/23/25  0429 06/22/25  0458 06/21/25  1458 06/21/25  1401   WBC 7.76 7.67  --  5.98   HEMOGLOBIN 11.4* 11.4*  --  12.7   HEMATOCRIT 35.1 35.0  --  38.2   PLATELETS 238 246  --  256   NEUTROS ABS  --  5.36  --  3.20   IMMATURE GRANS (ABS)  --  0.04  --  0.05   LYMPHS ABS  --  1.41  --  1.75   MONOS ABS  --  0.70  --  0.65   EOS ABS  --  0.13  --  0.28   MCV 89.3 89.1  --  89.0   CRP  --  0.54*  --   --    PROCALCITONIN  --  0.06  --   --    LACTATE  --   --  1.5  --          Lab 06/23/25  0429 06/22/25  0458 06/21/25  1458   SODIUM 137 137 138   POTASSIUM 5.0 4.5 4.6   CHLORIDE 103 102 103   CO2 25.0 23.0 25.0   ANION GAP 9.0 12.0 10.0   BUN 20.0 21.3 16.6   CREATININE 0.48* 0.42* 0.54*   EGFR 84.1 86.9 81.8   GLUCOSE 99 98 154*   CALCIUM 8.6 8.6 8.8   MAGNESIUM 2.0  --   --          Lab 06/21/25  1458   TOTAL PROTEIN 7.1   ALBUMIN 3.5   GLOBULIN 3.6   ALT (SGPT) 20   AST (SGOT) 27   BILIRUBIN 0.3   ALK PHOS 85                     Brief Urine Lab Results       None          Microbiology Results (last 10 days)       Procedure Component Value - Date/Time    MRSA Screen, PCR (Inpatient) - Swab, Nares [[id number]]  (Abnormal) Collected: 06/21/25 4474    Lab Status: Final result Specimen: Swab from Nares Updated: 06/22/25 0847     MRSA PCR Positive    Narrative:      The negative predictive value of this diagnostic test is high and should only be used to consider de-escalating anti-MRSA therapy. A positive result may indicate colonization with MRSA and must be correlated clinically.    COVID PRE-OP / PRE-PROCEDURE SCREENING ORDER (NO ISOLATION) - Swab, Nasopharynx [[id number]]  (Normal) Collected: 06/21/25 6772    Lab Status: Final result Specimen: Swab from Nasopharynx Updated: 06/21/25 5455    Narrative:      The following orders were created for panel order COVID PRE-OP /  PRE-PROCEDURE SCREENING ORDER (NO ISOLATION) - Swab, Nasopharynx.  Procedure                               Abnormality         Status                     ---------                               -----------         ------                     COVID-19, FLU A/B, RSV P...[[id number]]  Normal              Final result                 Please view results for these tests on the individual orders.    COVID-19, FLU A/B, RSV PCR 1 HR TAT - Swab, Nasopharynx [[id number]]  (Normal) Collected: 06/21/25 1456    Lab Status: Final result Specimen: Swab from Nasopharynx Updated: 06/21/25 1622     COVID19 Not Detected     Influenza A PCR Not Detected     Influenza B PCR Not Detected     RSV, PCR Not Detected    Narrative:      Fact sheet for providers: https://www.fda.gov/media/960009/download    Fact sheet for patients: https://www.fda.gov/media/760734/download    Test performed by PCR.            CT Chest Without Contrast Diagnostic  Result Date: 6/21/2025  CT CHEST WO CONTRAST DIAGNOSTIC, CT SOFT TISSUE NECK WO CONTRAST Date of Exam: 6/21/2025 3:14 PM EDT Indication: cough and rhonchorous breath sounds after witnessed aspiration event. Comparison: None available. Technique: Axial CT images were obtained of the neck and chest without contrast administration.  Reconstructed coronal and sagittal images were also obtained. Automated exposure control and iterative construction methods were used. Findings: CT SCAN OF THE NECK SOFT TISSUES WITHOUT CONTRAST: No acute abnormalities are seen of the included base of the brain. Included paranasal sinuses and mastoids appear clear. Cervical airway appears widely patent. No airway foreign body is seen. No enlargement of the adenoids or tonsils is seen. Epiglottis appears normal. Prevertebral soft tissues appear normal. No cervical adenopathy, mass or acute inflammatory change is identified.     Neck soft tissues appear within normal limits. No evidence of cervical airway stenosis or soft tissue  inflammation. CT SCAN OF CHEST WITHOUT CONTRAST: What appears to be an irregular, almost rectangular shaped nodule on axial image 45/2 in the right mid lung is seen as a focal area of discoid atelectasis paralleling the minor fissure on coronal images, specifically coronal image 63/900. This measures 20 x 12 x 5 mm in maximal dimensions. There are mild chronic appearing interstitial changes in the upper and mid lungs. In the lower lungs, there is thickening of multiple right lower lobe bronchi, mild associated interstitial change, and focal right middle lobe airspace opacity adjacent the right hemidiaphragm, that may represent aspiration or small foci of pneumonia. Lungs elsewhere appear clear. Images the mediastinum show calcified gallbladder fundus, and either partially calcifying inspissated debris in the gallbladder from nonfunctioning gallbladder or tightly packed partially calcified gallstones. No inflammatory change or invasive mass is seen. No acute upper abdominal pathology is seen elsewhere. There is an old, smooth margined vertebral compression deformity of T4. There is an old healed upper sternal fracture. IMPRESSION: 1. Right lower lobe thick-walled, partially fluid-filled bronchi, whether mild aspiration or bronchitis. Focal right middle lobe airspace disease that may represent aspiration or small focus of pneumonia. 2. Plaque-like density adjacent the minor fissure in the right upper lobe. This resembles a pulmonary nodule on axial imaging but is seen as a thin smooth scarlike plaque on coronal imaging, likely benign. Continued imaging follow-up is suggested. 3. Calcified gallbladder findings, and calcified matrix like appearance of the material in the gallbladder lumen, without evidence of either inflammation or invasive mass. Findings may be related to longstanding gallbladder dysfunction. Electronically Signed: Andrea Pfeiffer MD  6/21/2025 3:55 PM EDT  Workstation ID: [id number]    CT Soft Tissue Neck  Without Contrast  Result Date: 6/21/2025  CT CHEST WO CONTRAST DIAGNOSTIC, CT SOFT TISSUE NECK WO CONTRAST Date of Exam: 6/21/2025 3:14 PM EDT Indication: cough and rhonchorous breath sounds after witnessed aspiration event. Comparison: None available. Technique: Axial CT images were obtained of the neck and chest without contrast administration.  Reconstructed coronal and sagittal images were also obtained. Automated exposure control and iterative construction methods were used. Findings: CT SCAN OF THE NECK SOFT TISSUES WITHOUT CONTRAST: No acute abnormalities are seen of the included base of the brain. Included paranasal sinuses and mastoids appear clear. Cervical airway appears widely patent. No airway foreign body is seen. No enlargement of the adenoids or tonsils is seen. Epiglottis appears normal. Prevertebral soft tissues appear normal. No cervical adenopathy, mass or acute inflammatory change is identified.     Neck soft tissues appear within normal limits. No evidence of cervical airway stenosis or soft tissue inflammation. CT SCAN OF CHEST WITHOUT CONTRAST: What appears to be an irregular, almost rectangular shaped nodule on axial image 45/2 in the right mid lung is seen as a focal area of discoid atelectasis paralleling the minor fissure on coronal images, specifically coronal image 63/900. This measures 20 x 12 x 5 mm in maximal dimensions. There are mild chronic appearing interstitial changes in the upper and mid lungs. In the lower lungs, there is thickening of multiple right lower lobe bronchi, mild associated interstitial change, and focal right middle lobe airspace opacity adjacent the right hemidiaphragm, that may represent aspiration or small foci of pneumonia. Lungs elsewhere appear clear. Images the mediastinum show calcified gallbladder fundus, and either partially calcifying inspissated debris in the gallbladder from nonfunctioning gallbladder or tightly packed partially calcified gallstones.  No inflammatory change or invasive mass is seen. No acute upper abdominal pathology is seen elsewhere. There is an old, smooth margined vertebral compression deformity of T4. There is an old healed upper sternal fracture. IMPRESSION: 1. Right lower lobe thick-walled, partially fluid-filled bronchi, whether mild aspiration or bronchitis. Focal right middle lobe airspace disease that may represent aspiration or small focus of pneumonia. 2. Plaque-like density adjacent the minor fissure in the right upper lobe. This resembles a pulmonary nodule on axial imaging but is seen as a thin smooth scarlike plaque on coronal imaging, likely benign. Continued imaging follow-up is suggested. 3. Calcified gallbladder findings, and calcified matrix like appearance of the material in the gallbladder lumen, without evidence of either inflammation or invasive mass. Findings may be related to longstanding gallbladder dysfunction. Electronically Signed: Andrea Pfeiffer MD  6/21/2025 3:55 PM EDT  Workstation ID: [id number]              Results for orders placed during the hospital encounter of 12/15/22    Adult Transthoracic Echo Complete W/ Cont if Necessary Per Protocol (With Agitated Saline)    Interpretation Summary    Left ventricular systolic function is normal. Left ventricular ejection fraction appears to be 66 - 70%.    Left ventricular wall thickness is consistent with mild septal asymmetric hypertrophy.    Left ventricular diastolic function is consistent with (grade Ia w/high LAP) impaired relaxation.    Left atrial volume is mildly increased.    Mild aortic valve regurgitation is present.    Mild mitral annular calcification is present. There is mild calcification of the mitral valve anterior leaflet      Plan for Follow-up of Pending Labs/Results:     Discharge Details        Discharge Medications        New Medications        Instructions Start Date   azithromycin 500 MG tablet  Commonly known as: Zithromax   500 mg, Oral,  Daily   Start Date: June 24, 2025     benzonatate 100 MG capsule  Commonly known as: TESSALON   100 mg, Oral, 3 Times Daily PRN      cefdinir 300 MG capsule  Commonly known as: OMNICEF   300 mg, Oral, 2 Times Daily   Start Date: June 24, 2025     guaifenesin 100 MG/5ML liquid  Commonly known as: ROBITUSSIN   200 mg, Oral, 3 Times Daily PRN             Continue These Medications        Instructions Start Date   aspirin 325 MG EC tablet   325 mg, Oral, Daily      atorvastatin 40 MG tablet  Commonly known as: LIPITOR   40 mg, Oral, Nightly      Garlic 100 MG tablet   100 mg, Oral, Daily      lidocaine 5 %  Commonly known as: LIDODERM   1 patch, Transdermal, Every 24 Hours Scheduled, Remove & Discard patch within 12 hours or as directed by MD      melatonin 5 MG tablet tablet   5 mg, Oral, Nightly PRN      multivitamin with minerals tablet tablet   1 tablet, Oral, 2 Times Daily      Tylenol 325 MG tablet  Generic drug: acetaminophen   650 mg, Oral, Every 6 Hours PRN             Stop These Medications      lisinopril 10 MG tablet  Commonly known as: PRINIVIL,ZESTRIL              No Known Allergies      Discharge Disposition:  Home or Self Care    Diet:  Hospital:  Diet Order   Procedures    Diet: Regular/House; Feeding Assistance - Nursing; Texture: Soft to Chew (NDD 3); Soft to Chew: Chopped Meat; Fluid Consistency: Thin (IDDSI 0)            Activity:      Restrictions or Other Recommendations:         CODE STATUS:    Code Status and Medical Interventions: No CPR (Do Not Attempt to Resuscitate); Limited Support; No intubation (DNI)   Ordered at: 06/23/25 1053     Code Status (Patient has no pulse and is not breathing):    No CPR (Do Not Attempt to Resuscitate)     Medical Interventions (Patient has pulse or is breathing):    Limited Support     Medical Intervention Limits:    No intubation (DNI)     Level Of Support Discussed With:    Patient       No future appointments.    Additional Instructions for the Follow-ups  that You Need to Schedule       Discharge Follow-up with PCP   As directed       Currently Documented PCP:    Jamal Graham MD    PCP Phone Number:    [phone]     Follow Up Details: 1 week                      Emily Neely MD  06/23/25      Time Spent on Discharge:  I spent  45  minutes on this discharge activity which included: face-to-face encounter with the patient, reviewing the data in the system, coordination of the care with the nursing staff as well as consultants, documentation, and entering orders.            Electronically signed by Emily Neely MD at 06/23/25 1557